# Patient Record
Sex: MALE | Race: WHITE | Employment: OTHER | URBAN - METROPOLITAN AREA
[De-identification: names, ages, dates, MRNs, and addresses within clinical notes are randomized per-mention and may not be internally consistent; named-entity substitution may affect disease eponyms.]

---

## 2017-02-07 ENCOUNTER — HOSPITAL ENCOUNTER (OUTPATIENT)
Dept: MRI IMAGING | Age: 82
Discharge: HOME | End: 2017-02-07
Attending: PHYSICAL MEDICINE & REHABILITATION
Payer: MEDICARE

## 2017-02-07 DIAGNOSIS — M54.2 CERVICALGIA: ICD-10-CM

## 2017-02-07 DIAGNOSIS — M50.90 CERVICAL DISC DISEASE: ICD-10-CM

## 2017-02-07 PROCEDURE — 72141 MRI NECK SPINE W/O DYE: CPT

## 2017-06-01 ENCOUNTER — OFFICE VISIT (OUTPATIENT)
Dept: NEUROLOGY | Age: 82
End: 2017-06-01

## 2017-06-01 VITALS
SYSTOLIC BLOOD PRESSURE: 132 MMHG | OXYGEN SATURATION: 99 % | BODY MASS INDEX: 24.17 KG/M2 | WEIGHT: 154 LBS | HEART RATE: 74 BPM | HEIGHT: 67 IN | DIASTOLIC BLOOD PRESSURE: 72 MMHG

## 2017-06-01 DIAGNOSIS — F51.04 PSYCHOPHYSIOLOGICAL INSOMNIA: ICD-10-CM

## 2017-06-01 DIAGNOSIS — E11.42 DIABETIC PERIPHERAL NEUROPATHY ASSOCIATED WITH TYPE 2 DIABETES MELLITUS (HCC): Primary | ICD-10-CM

## 2017-06-01 DIAGNOSIS — F32.A DEPRESSION, UNSPECIFIED DEPRESSION TYPE: ICD-10-CM

## 2017-06-01 RX ORDER — DULOXETIN HYDROCHLORIDE 30 MG/1
30 CAPSULE, DELAYED RELEASE ORAL DAILY
Qty: 30 CAP | Refills: 5 | Status: SHIPPED | OUTPATIENT
Start: 2017-06-01 | End: 2017-12-29 | Stop reason: SDUPTHER

## 2017-06-01 NOTE — MR AVS SNAPSHOT
Visit Information Date & Time Provider Department Dept. Phone Encounter #  
 6/1/2017  2:30 PM Lisa Bonilla NP [facility] [phone] [id number] Follow-up Instructions Return in about 6 months (around 12/1/2017). Upcoming Health Maintenance Date Due  
 LIPID PANEL Q1 1935 FOOT EXAM Q1 9/6/1945 MICROALBUMIN Q1 9/6/1945 EYE EXAM RETINAL OR DILATED Q1 9/6/1945 ZOSTER VACCINE AGE 60> 9/6/1995 GLAUCOMA SCREENING Q2Y 9/6/2000 Pneumococcal 65+ Low/Medium Risk (1 of 2 - PCV13) 9/6/2000 MEDICARE YEARLY EXAM 9/6/2000 HEMOGLOBIN A1C Q6M 7/5/2016 INFLUENZA AGE 9 TO ADULT 8/1/2017 DTaP/Tdap/Td series (2 - Td) 10/23/2025 Allergies as of 6/1/2017  Review Complete On: 6/1/2017 By: Davian Trevizo LPN Severity Noted Reaction Type Reactions Sulfa (Sulfonamide Antibiotics) Low 02/28/2012   Topical Hives Current Immunizations  Reviewed on 11/12/2016 Name Date Tdap 10/23/2015 12:12 PM  
  
 Not reviewed this visit You Were Diagnosed With   
  
 Codes Comments Diabetic peripheral neuropathy associated with type 2 diabetes mellitus ([facility])    -  Primary ICD-10-CM: E11.42 
ICD-9-CM: 250.60, 357.2 Depression, unspecified depression type     ICD-10-CM: F32.9 ICD-9-CM: 740 Psychophysiological insomnia     ICD-10-CM: F51.04 
ICD-9-CM: 307.42 Vitals BP Pulse Height(growth percentile) Weight(growth percentile) SpO2 BMI  
 132/72 74 5' 7\" (1.702 m) 154 lb (69.9 kg) 99% 24.12 kg/m2 Smoking Status Never Smoker Vitals History BMI and BSA Data Body Mass Index Body Surface Area  
 24.12 kg/m 2 1.82 m 2 Preferred Pharmacy Pharmacy Name Phone Jordyn Guadarrama [address] [facility], [address] [place] [address] [phone] Your Updated Medication List  
  
   
This list is accurate as of: 6/1/17  3:04 PM.  Always use your most recent med list.  
 acyclovir 400 mg tablet Commonly known as:  ZOVIRAX  
  
 aspirin delayed-release 81 mg tablet Take  by mouth daily. celecoxib 200 mg capsule Commonly known as:  CELEBREX  
  
 copper gluconate 2 mg Tab tablet Take  by mouth. DULoxetine 30 mg capsule Commonly known as:  CYMBALTA Take 1 Cap by mouth daily. FOLIC ACID PO Take  by mouth.  
  
 gabapentin 600 mg tablet Commonly known as:  NEURONTIN Take 1 Tab by mouth three (3) times daily. glucosamine-chondroitin 750-600 mg Tab Take 1,500 mg by mouth two (2) times a day. lisinopril 10 mg tablet Commonly known as:  PRINIVIL, ZESTRIL  
  
 multivitamin tablet Commonly known as:  ONE A DAY Take 1 Tab by mouth daily. nicotinic acid 500 mg tablet Commonly known as:  NIACIN Take 500 mg by mouth Daily (before breakfast). omeprazole 40 mg capsule Commonly known as:  PRILOSEC Take 40 mg by mouth daily. Potassium Gluconate 595 mg (99 mg) tablet Take  by mouth three (3) times daily (with meals). PROBIOTIC (S.BOULARDII) PO Take  by mouth. SALMON OIL-1000 PO Take  by mouth. traZODone 50 mg tablet Commonly known as:  DESYREL  
  
 VITAMIN B-12 1,000 mcg tablet Generic drug:  cyanocobalamin Take 1,000 mcg by mouth daily. Indications: PREVENTION OF VITAMIN B12 DEFICIENCY  
  
 VITAMIN B-6 200 mg tablet Generic drug:  pyridoxine (vitamin B6) Take 400 mg by mouth daily. VITAMIN D2 400 unit Cap Generic drug:  ergocalciferol (vitamin d2) Take 200 Units by mouth daily. zolpidem 10 mg tablet Commonly known as:  AMBIEN Take 10 mg by mouth nightly. Indications: SLEEP-ONSET INSOMNIA Prescriptions Sent to Pharmacy Refills DULoxetine (CYMBALTA) 30 mg capsule 5 Sig: Take 1 Cap by mouth daily. Class: Normal  
 Pharmacy: [address], [address] [place] [address] Ph #: [phone]  Route: Oral  
 Follow-up Instructions Return in about 6 months (around 12/1/2017). Patient Instructions PRESCRIPTION REFILL POLICY [facility] Statement to Patients April 1, 2014 In an effort to ensure the large volume of patient prescription refills is processed in the most efficient and expeditious manner, we are asking our patients to assist us by calling your Pharmacy for all prescription refills, this will include also your  Mail Order Pharmacy. The pharmacy will contact our office electronically to continue the refill process. Please do not wait until the last minute to call your pharmacy. We need at least 48 hours (2days) to fill prescriptions. We also encourage you to call your pharmacy before going to  your prescription to make sure it is ready. With regard to controlled substance prescription refill requests (narcotic refills) that need to be picked up at our office, we ask your cooperation by providing us with at least 72 hours (3days) notice that you will need a refill. We will not refill narcotic prescription refill requests after 4:00pm on any weekday, Monday through Thursday, or after 2:00pm on Fridays, or on the weekends. We encourage everyone to explore another way of getting your prescription refill request processed using YouRenew, our patient web portal through our electronic medical record system. YouRenew is an efficient and effective way to communicate your medication request directly to the office and  downloadable as an ryder on your smart phone . You[employer]ew also features a review functionality that allows you to view your medication list as well as leave messages for your physician. Are you ready to get connected? If so please review the attatched instructions or speak to any of our staff to get you set up right away! Thank you so much for your cooperation. Should you have any questions please contact our Practice Administrator. The Physicians and Staff,  [facility] A Healthy Lifestyle: Care Instructions Your Care Instructions A healthy lifestyle can help you feel good, stay at a healthy weight, and have plenty of energy for both work and play. A healthy lifestyle is something you can share with your whole family. A healthy lifestyle also can lower your risk for serious health problems, such as high blood pressure, heart disease, and diabetes. You can follow a few steps listed below to improve your health and the health of your family. Follow-up care is a key part of your treatment and safety. Be sure to make and go to all appointments, and call your doctor if you are having problems. Its also a good idea to know your test results and keep a list of the medicines you take. How can you care for yourself at home? · Do not eat too much sugar, fat, or fast foods. You can still have dessert and treats now and then. The goal is moderation. · Start small to improve your eating habits. Pay attention to portion sizes, drink less juice and soda pop, and eat more fruits and vegetables. ¨ Eat a healthy amount of food. A 3-ounce serving of meat, for example, is about the size of a deck of cards. Fill the rest of your plate with vegetables and whole grains. ¨ Limit the amount of soda and sports drinks you have every day. Drink more water when you are thirsty. ¨ Eat at least 5 servings of fruits and vegetables every day. It may seem like a lot, but it is not hard to reach this goal. A serving or helping is 1 piece of fruit, 1 cup of vegetables, or 2 cups of leafy, raw vegetables. Have an apple or some carrot sticks as an afternoon snack instead of a candy bar. Try to have fruits and/or vegetables at every meal. 
· Make exercise part of your daily routine. You may want to start with simple activities, such as walking, bicycling, or slow swimming.  Try to be active 30 to 60 minutes every day. You do not need to do all 30 to 60 minutes all at once. For example, you can exercise 3 times a day for 10 or 20 minutes. Moderate exercise is safe for most people, but it is always a good idea to talk to your doctor before starting an exercise program. 
· Keep moving. Angelique Watson the lawn, work in the garden, or [employer]. Take the stairs instead of the elevator at work. · If you smoke, quit. People who smoke have an increased risk for heart attack, stroke, cancer, and other lung illnesses. Quitting is hard, but there are ways to boost your chance of quitting tobacco for good. ¨ Use nicotine gum, patches, or lozenges. ¨ Ask your doctor about stop-smoking programs and medicines. ¨ Keep trying. In addition to reducing your risk of diseases in the future, you will notice some benefits soon after you stop using tobacco. If you have shortness of breath or asthma symptoms, they will likely get better within a few weeks after you quit. · Limit how much alcohol you drink. Moderate amounts of alcohol (up to 2 drinks a day for men, 1 drink a day for women) are okay. But drinking too much can lead to liver problems, high blood pressure, and other health problems. Family health If you have a family, there are many things you can do together to improve your health. · Eat meals together as a family as often as possible. · Eat healthy foods. This includes fruits, vegetables, lean meats and dairy, and whole grains. · Include your family in your fitness plan. Most people think of activities such as jogging or tennis as the way to fitness, but there are many ways you and your family can be more active. Anything that makes you breathe hard and gets your heart pumping is exercise. Here are some tips: 
¨ Walk to do errands or to take your child to school or the bus. ¨ Go for a family bike ride after dinner instead of watching TV. Where can you learn more? Go to http://marilynn-nils.info/. Enter R441 in the search box to learn more about \"A Healthy Lifestyle: Care Instructions. \" Current as of: July 26, 2016 Content Version: 11.2 © 4162-3244 [employer]. Care instructions adapted under license by [employer] (which disclaims liability or warranty for this information). If you have questions about a medical condition or this instruction, always ask your healthcare professional. [address] any warranty or liability for your use of this information. Introducing [facility]! Dear Mana Spangler: 
Thank you for requesting a Greenlots account. Our records indicate that you already have an active Greenlots account. You can access your account anytime at [email or web address] Did you know that you can access your hospital and ER discharge instructions at any time in Greenlots? You can also review all of your test results from your hospital stay or ER visit. Additional Information If you have questions, please visit the Frequently Asked Questions section of the Greenlots website at [email or web address] Remember, Greenlots is NOT to be used for urgent needs. For medical emergencies, dial 911. Now available from your iPhone and Android! Please provide this summary of care documentation to your next provider. Your primary care clinician is listed as Daniel Smith III. If you have any questions after today's visit, please call [phone].

## 2017-06-01 NOTE — PROGRESS NOTES
Date:             2017     Name:  Janene Kay  :  1935   MRN:  [id number]     PCP:  Nahomy Venegas MD    Chief Complaint   Patient presents with    Follow-up         HISTORY OF PRESENT ILLNESS:  Lauri Vee is a 80 y.o., male who presents today for follow up for neuropathy. His feet are very painful at night, he has tried using voltaren on his feet which he is prescribed for his arthritis and does think that it helps a little bit. He takes 600 mg gabapentin bid, he thinks that increasing that would make him tired. He could not tolerate it 3 times a day. He does not sleep very well, but doesn't think it's his feet that keep him up. They bother him for the first 30 minutes or so. He just doesn't sleep well, has trouble falling and staying asleep. He does not think that he snores, does not wake up gasping for air. He takes trazodone as needed and ambien. has had trouble with sleep since his wife  45 years ago, he is not on any antidepressant other than his occasional trazodone for sleep. 2016 recap  Lauri Vee is a 80 y.o. 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 The patient has been having increasing burning pain and numbness in his feet, with abnormal feeling in his toes and feet, and some difficulty with balance and coordination seem at the request of Dr. Bessy Álvarez. Patient's EMG study was done in 2016 and also showed a chronic L5 radiculopathy on the left side and the right borderline carpal tunnel syndrome on the right side. Patient has a known history of diabetes, and probably has a diabetic neuropathy, and does better on Neurontin 600 mg twice a day.  We did a metabolic panel to rule out other causes of his neuropathy, and they were unremarkable so it looks like he most likely has a diabetic peripheral neuropathy related to his diabetes. He has a post lumbar laminectomy syndrome with chronic pain. We suggested he see Dr. Yoshi Mcneil continue his pain management, or could refer him to [facility] [place] for pain management if he is unhappy. We will also try to increase his Neurontin to 600 mg 3 times a day and see if he can tolerate the medication and drowsiness. He will call us if any problem. Patient was initially seen 12 months ago for evaluation of sudden loss of vision with a left peripheral visual field cut, that resolved spontaneously on its own and MRI scans did not show any clear stroke and he had no intracranial or extracranial significant vascular disease. He is stable on 81 mg of aspirin every day and not had a recurrent stroke like symptoms. His carotid Doppler study showed no significant stenosis last visit. No other retinal cause for his visual loss was found. He also complains of numbness in his feet, and a slight loss of balance. He is a known diabetic. He has a history of previous lumbar laminectomy, and he is in pain management for chronic low back pain. He has no major cognitive issues, but does have some mild recent memory loss at times, but does not really feel it needs an evaluation, and his wife initially seemed concerned but then says she doesn't think he needs an evaluation either. Patient has no known history of previous stroke, no significant headache except a mild chronic generalized headache ever since the fall, no prior history of visual loss, and no other new focal weakness or sensory loss except for the numbness in his feet. Patient has no jaw claudication or other PMR symptoms. Current Outpatient Prescriptions   Medication Sig    celecoxib (CELEBREX) 200 mg capsule     lisinopril (PRINIVIL, ZESTRIL) 10 mg tablet     traZODone (DESYREL) 50 mg tablet     Potassium Gluconate 595 mg (99 mg) tablet Take  by mouth three (3) times daily (with meals).  copper gluconate 2 mg tab tablet Take  by mouth.  FOLIC ACID PO Take  by mouth.  SALMON OIL/OMEGA-3 FATTY ACIDS (SALMON OIL-1000 PO) Take  by mouth.  acyclovir (ZOVIRAX) 400 mg tablet     gabapentin (NEURONTIN) 600 mg tablet Take 1 Tab by mouth three (3) times daily.  SACCHAROMYCES BOULARDII (PROBIOTIC, S.BOULARDII, PO) Take  by mouth.  aspirin delayed-release 81 mg tablet Take  by mouth daily.  omeprazole (PRILOSEC) 40 mg capsule Take 40 mg by mouth daily.  zolpidem (AMBIEN) 10 mg tablet Take 10 mg by mouth nightly. Indications: SLEEP-ONSET INSOMNIA    cyanocobalamin (VITAMIN B-12) 1,000 mcg tablet Take 1,000 mcg by mouth daily. Indications: PREVENTION OF VITAMIN B12 DEFICIENCY    pyridoxine (VITAMIN B-6) 200 mg tablet Take 400 mg by mouth daily.  ergocalciferol, vitamin d2, (VITAMIN D) 400 unit Cap Take 200 Units by mouth daily.  nicotinic acid (NIACIN) 500 mg tablet Take 500 mg by mouth Daily (before breakfast).  GLUCOSAMINE HCL/CHONDR WELDON A NA (GLUCOSAMINE-CHONDROITIN) 750-600 mg Tab Take 1,500 mg by mouth two (2) times a day.  multivitamin (ONE A DAY) tablet Take 1 Tab by mouth daily. No current facility-administered medications for this visit.       Allergies   Allergen Reactions    Sulfa (Sulfonamide Antibiotics) Hives     Past Medical History:   Diagnosis Date    Arthritis     osteoarthritis    Cancer ([address]     prostate, basal cell CA nose, bladder CA    Diabetes (HCC)     borderline    GERD (gastroesophageal reflux disease)     Insomnia     Selenium deficiency      Past Surgical History:   Procedure Laterality Date    HX HEENT  2009    Right cataract    HX ORTHOPAEDIC  2009    Rt rotator cuff    HX ORTHOPAEDIC  1/2012    Left carpal tunnel    HX ORTHOPAEDIC      cervical spine spacers placed    HX ORTHOPAEDIC  7-2012    lumbar    HX OTHER SURGICAL  10/2011    basal cell CA nose    HX PROSTATECTOMY  1998    due to CA    HX UROLOGICAL      skin ca inside of my bladder,     Social History Social History    Marital status:      Spouse name: N/A    Number of children: N/A    Years of education: N/A     Occupational History    Not on file. Social History Main Topics    Smoking status: Never Smoker    Smokeless tobacco: Never Used      Comment: chewing tobacco quit years ago    Alcohol use 1.2 oz/week     2 Glasses of wine per week      Comment: occasionally    Drug use: No    Sexual activity: Yes     Partners: Female     Other Topics Concern    Not on file     Social History Narrative     Family History   Problem Relation Age of Onset   Rylie Zhangon Other Mother      old age   Rylie Harshad Other Father      typhoid fever    Hypertension Brother     Asthma Brother     Cancer Brother      esophageal    Diabetes Brother     Arthritis-osteo Brother          PHYSICAL EXAMINATION:    Visit Vitals    /72    Pulse 74    Ht 5' 7\" (1.702 m)    Wt 154 lb (69.9 kg)    SpO2 99%    BMI 24.12 kg/m2     General:  Well defined, nourished, and groomed individual in no acute distress. Neck: Supple, nontender, no bruits, no pain with resistance to active range of motion. Heart: Regular rate and rhythm, no murmurs, rub, or gallop. Normal S1S2. Lungs:  Clear to auscultation bilaterally with equal chest expansion, no cough, no wheeze  Musculoskeletal:  Extremities revealed no edema and had full range of motion of joints. Psych:  Good mood and bright affect    NEUROLOGICAL EXAMINATION:     Mental Status:   Alert and oriented to person, place, and time with recent and remote memory intact. Attention span and concentration are normal. Speech is fluent with a full fund of knowledge. Cranial Nerves:    II, III, IV, VI:  Visual acuity grossly intact. Pupils are equal, round, and reactive to light. Extra-ocular movements are full and fluid. No ptosis or nystagmus. V-XII: Hearing is grossly intact. Facial features are symmetric, with normal sensation and strength.   The palate rises symmetrically and the tongue protrudes midline. Sternocleidomastoids 5/5. Motor Examination: Normal tone, bulk, and strength, 5/5 muscle strength throughout. Coordination:  Finger to nose testing was normal.   No resting or intention tremor  Gait and Station:  Steady while walking and with tandem walking. Normal arm swing. No pronator drift. No muscle wasting or fasciculations noted. ASSESSMENT AND PLAN    ICD-10-CM ICD-9-CM    1. Diabetic peripheral neuropathy associated with type 2 diabetes mellitus (HCC) E11.42 250.60 DULoxetine (CYMBALTA) 30 mg capsule     357.2    2. Depression, unspecified depression type F32.9 311 DULoxetine (CYMBALTA) 30 mg capsule   3. Psychophysiological insomnia F51.04 46.42      80year-old male seen in follow-up for diabetic neuropathy. This is still bothersome, he takes gabapentin 600 mg twice daily. Cannot tolerate taking any more than that. He has trouble sleeping, but does not think it is foot pain that keeps him awake. He has trouble falling asleep and staying asleep. Does not think his sleep apnea, does not wake up gasping for air although he is very tired in the day. He has had trouble sleeping since his wife committed suicide 38 years ago. 1.  Continue gabapentin 600 mg twice daily for neuropathic pain  2. Okay to use Voltaren cream as needed for neuropathy as well  3. Will try Cymbalta 30 mg daily for both neuropathic pain and to see if mood benefits help with his sleep. Follow-up in 6 months, call sooner with concerns      Gasper Goodwin NP    This note was created using voice recognition software. Despite editing, there may be syntax errors.

## 2017-06-01 NOTE — PATIENT INSTRUCTIONS
[facility]   Statement to Patients  April 1, 2014      In an effort to ensure the large volume of patient prescription refills is processed in the most efficient and expeditious manner, we are asking our patients to assist us by calling your Pharmacy for all prescription refills, this will include also your  Mail Order Pharmacy. The pharmacy will contact our office electronically to continue the refill process. Please do not wait until the last minute to call your pharmacy. We need at least 48 hours (2days) to fill prescriptions. We also encourage you to call your pharmacy before going to  your prescription to make sure it is ready. With regard to controlled substance prescription refill requests (narcotic refills) that need to be picked up at our office, we ask your cooperation by providing us with at least 72 hours (3days) notice that you will need a refill. We will not refill narcotic prescription refill requests after 4:00pm on any weekday, Monday through Thursday, or after 2:00pm on Fridays, or on the weekends. We encourage everyone to explore another way of getting your prescription refill request processed using [employer], our patient web portal through our electronic medical record system. [employer] is an efficient and effective way to communicate your medication request directly to the office and  downloadable as an ryder on your smart phone . [employer] also features a review functionality that allows you to view your medication list as well as leave messages for your physician. Are you ready to get connected? If so please review the attatched instructions or speak to any of our staff to get you set up right away! Thank you so much for your cooperation. Should you have any questions please contact our Practice Administrator.     The Physicians and Staff,  [facility]          A Healthy Lifestyle: Care Instructions  Your Care Instructions  A healthy lifestyle can help you feel good, stay at a healthy weight, and have plenty of energy for both work and play. A healthy lifestyle is something you can share with your whole family. A healthy lifestyle also can lower your risk for serious health problems, such as high blood pressure, heart disease, and diabetes. You can follow a few steps listed below to improve your health and the health of your family. Follow-up care is a key part of your treatment and safety. Be sure to make and go to all appointments, and call your doctor if you are having problems. Its also a good idea to know your test results and keep a list of the medicines you take. How can you care for yourself at home? · Do not eat too much sugar, fat, or fast foods. You can still have dessert and treats now and then. The goal is moderation. · Start small to improve your eating habits. Pay attention to portion sizes, drink less juice and soda pop, and eat more fruits and vegetables. ¨ Eat a healthy amount of food. A 3-ounce serving of meat, for example, is about the size of a deck of cards. Fill the rest of your plate with vegetables and whole grains. ¨ Limit the amount of soda and sports drinks you have every day. Drink more water when you are thirsty. ¨ Eat at least 5 servings of fruits and vegetables every day. It may seem like a lot, but it is not hard to reach this goal. A serving or helping is 1 piece of fruit, 1 cup of vegetables, or 2 cups of leafy, raw vegetables. Have an apple or some carrot sticks as an afternoon snack instead of a candy bar. Try to have fruits and/or vegetables at every meal.  · Make exercise part of your daily routine. You may want to start with simple activities, such as walking, bicycling, or slow swimming. Try to be active 30 to 60 minutes every day. You do not need to do all 30 to 60 minutes all at once. For example, you can exercise 3 times a day for 10 or 20 minutes.  Moderate exercise is safe for most people, but it is always a good idea to talk to your doctor before starting an exercise program.  · Keep moving. Sona Canes the lawn, work in the garden, or [employer]. Take the stairs instead of the elevator at work. · If you smoke, quit. People who smoke have an increased risk for heart attack, stroke, cancer, and other lung illnesses. Quitting is hard, but there are ways to boost your chance of quitting tobacco for good. ¨ Use nicotine gum, patches, or lozenges. ¨ Ask your doctor about stop-smoking programs and medicines. ¨ Keep trying. In addition to reducing your risk of diseases in the future, you will notice some benefits soon after you stop using tobacco. If you have shortness of breath or asthma symptoms, they will likely get better within a few weeks after you quit. · Limit how much alcohol you drink. Moderate amounts of alcohol (up to 2 drinks a day for men, 1 drink a day for women) are okay. But drinking too much can lead to liver problems, high blood pressure, and other health problems. Family health  If you have a family, there are many things you can do together to improve your health. · Eat meals together as a family as often as possible. · Eat healthy foods. This includes fruits, vegetables, lean meats and dairy, and whole grains. · Include your family in your fitness plan. Most people think of activities such as jogging or tennis as the way to fitness, but there are many ways you and your family can be more active. Anything that makes you breathe hard and gets your heart pumping is exercise. Here are some tips:  ¨ Walk to do errands or to take your child to school or the bus. ¨ Go for a family bike ride after dinner instead of watching TV. Where can you learn more? Go to http://marilynn-nils.info/. Enter W030 in the search box to learn more about \"A Healthy Lifestyle: Care Instructions. \"  Current as of: July 26, 2016  Content Version: 11.2  © 2864-9645 [employer]. Care instructions adapted under license by [employer] (which disclaims liability or warranty for this information). If you have questions about a medical condition or this instruction, always ask your healthcare professional. [address] any warranty or liability for your use of this information.

## 2017-11-02 ENCOUNTER — TELEPHONE (OUTPATIENT)
Dept: NEUROLOGY | Age: 82
End: 2017-11-02

## 2017-11-02 NOTE — TELEPHONE ENCOUNTER
----- Message from Stevo Lorenzo sent at 11/2/2017  9:49 AM EDT -----  Regarding: Dr. Debra Swenson  Pt stated neuropathy in his feet is stinging and burning at night and would like to know if the doctor could recommend a cream or pills he could take.   Best contact number [phone](please leave message if no answer)

## 2017-11-02 NOTE — TELEPHONE ENCOUNTER
Patient was last seen 6/1/17 and at the time used voltaren with little benefit. Gabapentin 600mg twice daily and could not tolerate increased dose  Was to try Cymbalta 30mg daily added  I spoke with the patient and states he never filled this. Advised patient to try this to see if this helps. Spoke with Don Nur and advised the patient he can take any time during the day. I asked the patient to call back if he has further problems or effects.  Otherwise, we will see him at his follow up 12/19/17

## 2017-12-19 ENCOUNTER — OFFICE VISIT (OUTPATIENT)
Dept: NEUROLOGY | Age: 82
End: 2017-12-19

## 2017-12-19 VITALS
OXYGEN SATURATION: 96 % | HEIGHT: 67 IN | DIASTOLIC BLOOD PRESSURE: 62 MMHG | SYSTOLIC BLOOD PRESSURE: 124 MMHG | BODY MASS INDEX: 24.33 KG/M2 | HEART RATE: 58 BPM | WEIGHT: 155 LBS

## 2017-12-19 DIAGNOSIS — M96.1 CERVICAL POST-LAMINECTOMY SYNDROME: ICD-10-CM

## 2017-12-19 DIAGNOSIS — M54.16 LUMBAR BACK PAIN WITH RADICULOPATHY AFFECTING LEFT LOWER EXTREMITY: ICD-10-CM

## 2017-12-19 DIAGNOSIS — G60.8 IDIOPATHIC SMALL AND LARGE FIBER SENSORY NEUROPATHY: ICD-10-CM

## 2017-12-19 DIAGNOSIS — R41.3 MEMORY CHANGE: ICD-10-CM

## 2017-12-19 DIAGNOSIS — M96.1 LUMBAR POST-LAMINECTOMY SYNDROME: ICD-10-CM

## 2017-12-19 DIAGNOSIS — I63.231 CEREBRAL INFARCTION INVOLVING RIGHT CAROTID ARTERY (HCC): ICD-10-CM

## 2017-12-19 DIAGNOSIS — E11.42 DIABETIC PERIPHERAL NEUROPATHY ASSOCIATED WITH TYPE 2 DIABETES MELLITUS (HCC): ICD-10-CM

## 2017-12-19 RX ORDER — ZOLPIDEM TARTRATE 10 MG/1
5 TABLET ORAL
Qty: 30 TAB | Refills: 11 | Status: SHIPPED | OUTPATIENT
Start: 2017-12-19 | End: 2019-03-05 | Stop reason: ALTCHOICE

## 2017-12-19 RX ORDER — CYCLOSPORINE 0.5 MG/ML
1 EMULSION OPHTHALMIC EVERY 12 HOURS
COMMUNITY
Start: 2017-11-02 | End: 2022-06-30

## 2017-12-19 RX ORDER — GABAPENTIN 600 MG/1
600 TABLET ORAL 3 TIMES DAILY
Qty: 100 TAB | Refills: 11 | Status: SHIPPED | OUTPATIENT
Start: 2017-12-19 | End: 2018-07-06 | Stop reason: SDUPTHER

## 2017-12-19 NOTE — LETTER
12/19/2017 4:27 PM 
 
Patient:  Kristin Seymour YOB: 1935 Date of Visit: 12/19/2017 Dear No Recipients: Thank you for referring Mr. Kristin Seymour to me for evaluation/treatment. Below are the relevant portions of my assessment and plan of care. Consult Subjective:  
 
Kristin Seymour is a 80 y.o. Right-handed  male seen for evaluation at the request of Dr. 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 He was given Cymbalta 30 mg to take at noon last June, by the nurse practitioner  Cal Spann, with improvement in his pain, but made him drowsy and he discontinue the medication after 2 days. He is having trouble sleeping because the pain keeps him up at nighttime he wonders what he can do. I suggested he restart the Cymbalta at suppertime, and continue taking his Neurontin 600 mg twice a day in the interim. He can increase that to 3 times a day, possibly 1 in the morning, 1 at supper and 1 at bedtime if he needs to. Refills given to the patient for that. In addition he is on Ambien at night, and I suggested that trazodone may be safer for him, he should taper off the Ambien. He can take 1/2-1 tablet of trazodone at nighttime if he still cannot sleep with the above medications. The patient has been having increasing burning pain and numbness in his feet, with abnormal feeling in his toes and feet, and some difficulty with balance and coordination seem at the request of Dr. Shobha Greco.   Patient's EMG study was done in December 2016 and also showed a chronic L5 radiculopathy on the left side and the right borderline carpal tunnel syndrome on the right side. Patient has a known history of diabetes, and probably has a diabetic neuropathy, and does better on Neurontin 600 mg twice a day. We did a metabolic panel to rule out other causes of his neuropathy, and they were unremarkable so it looks like he most likely has a diabetic peripheral neuropathy related to his diabetes. He has a post lumbar laminectomy syndrome with chronic pain. We suggested he see Dr. Srinivasan Darden continue his pain management, or could refer him to [facility] [place] for pain management if he is unhappy. Patient was initially seen 12 months ago for evaluation of sudden loss of vision with a left peripheral visual field cut, that resolved spontaneously on its own and MRI scans did not show any clear stroke and he had no intracranial or extracranial significant vascular disease. He is stable on 81 mg of aspirin every day and not had a recurrent stroke like symptoms. His carotid Doppler study showed no significant stenosis last visit. No other retinal cause for his visual loss was found. He has no major cognitive issues, but does have some mild recent memory loss at times, but does not really feel it needs an evaluation, and his wife initially seemed concerned but then says she doesn't think he needs an evaluation either. Patient has no known history of previous stroke, no significant headache except a mild chronic generalized headache ever since the fall, no prior history of visual loss, and no other new focal weakness or sensory loss except for the numbness in his feet. Patient has no jaw claudication or other PMR symptoms. Past Medical History:  
Diagnosis Date  Arthritis   
 osteoarthritis  Cancer ([address]   
 prostate, basal cell CA nose, bladder CA  Diabetes ([address] borderline  GERD (gastroesophageal reflux disease)  Insomnia  Selenium deficiency Past Surgical History:  
Procedure Laterality Date  HX HEENT  2009 Right cataract  HX ORTHOPAEDIC  2009 Rt rotator cuff  HX ORTHOPAEDIC  1/2012 Left carpal tunnel  HX ORTHOPAEDIC    
 cervical spine spacers placed  HX ORTHOPAEDIC  [id number]  
 lumbar  HX OTHER SURGICAL  10/2011  
 basal cell CA nose  HX PROSTATECTOMY  1998  
 due to CA  
 HX UROLOGICAL    
 skin ca inside of my bladder,  
 
Family History Problem Relation Age of Onset Ian Novak Other Mother   
  old age  Other Father   
  typhoid fever  Hypertension Brother  Asthma Brother  Cancer Brother   
  esophageal  
 Diabetes Brother  Arthritis-osteo Brother Social History Substance Use Topics  Smoking status: Never Smoker  Smokeless tobacco: Never Used Comment: chewing tobacco quit years ago  Alcohol use 1.2 oz/week 2 Glasses of wine per week Comment: occasionally Current Outpatient Prescriptions:  
  RESTASIS 0.05 % ophthalmic emulsion, , Disp: , Rfl:  
  SILDENAFIL CITRATE (VIAGRA PO), Take  by mouth., Disp: , Rfl:  
  gabapentin (NEURONTIN) 600 mg tablet, Take 1 Tab by mouth three (3) times daily. , Disp: 100 Tab, Rfl: 11 
  zolpidem (AMBIEN) 10 mg tablet, Take 0.5 Tabs by mouth nightly. Max Daily Amount: 5 mg. Indications: SLEEP-ONSET INSOMNIA, Disp: 30 Tab, Rfl: 11 
  celecoxib (CELEBREX) 200 mg capsule, 200 mg daily. , Disp: , Rfl:  
  lisinopril (PRINIVIL, ZESTRIL) 10 mg tablet, 10 mg daily. , Disp: , Rfl:  
  traZODone (DESYREL) 50 mg tablet, Rarely and only half tab, Disp: , Rfl:  
  Potassium Gluconate 595 mg (99 mg) tablet, Take  by mouth three (3) times daily (with meals). , Disp: , Rfl:  
  copper gluconate 2 mg tab tablet, Take  by mouth., Disp: , Rfl:  
  FOLIC ACID PO, Take  by mouth., Disp: , Rfl:  
  SALMON OIL/OMEGA-3 FATTY ACIDS (SALMON OIL-1000 PO), Take  by mouth., Disp: , Rfl:  
  acyclovir (ZOVIRAX) 400 mg tablet, , Disp: , Rfl:  
  SACCHAROMYCES BOULARDII (PROBIOTIC, S.BOULARDII, PO), Take  by mouth., Disp: , Rfl:  
  aspirin delayed-release 81 mg tablet, Take  by mouth daily. , Disp: , Rfl:  
  omeprazole (PRILOSEC) 40 mg capsule, Take 40 mg by mouth daily. , Disp: , Rfl:  
  pyridoxine (VITAMIN B-6) 200 mg tablet, Take 400 mg by mouth daily. , Disp: , Rfl:  
  ergocalciferol, vitamin d2, (VITAMIN D) 400 unit Cap, Take 200 Units by mouth daily. , Disp: , Rfl:  
  nicotinic acid (NIACIN) 500 mg tablet, Take 500 mg by mouth Daily (before breakfast). , Disp: , Rfl:  
  GLUCOSAMINE HCL/CHONDR WELDON A NA (GLUCOSAMINE-CHONDROITIN) 750-600 mg Tab, Take 1,500 mg by mouth two (2) times a day.  , Disp: , Rfl:  
  multivitamin (ONE A DAY) tablet, Take 1 Tab by mouth daily. , Disp: , Rfl:  
  DULoxetine (CYMBALTA) 30 mg capsule, Take 1 Cap by mouth daily. , Disp: 30 Cap, Rfl: 5   cyanocobalamin (VITAMIN B-12) 1,000 mcg tablet, Take 1,000 mcg by mouth daily. Indications: PREVENTION OF VITAMIN B12 DEFICIENCY, Disp: , Rfl:  
 
 
 
Allergies Allergen Reactions  Sulfa (Sulfonamide Antibiotics) Hives Review of Systems: A comprehensive review of systems was negative except for: Eyes: positive for visual disturbance Musculoskeletal: positive for myalgias, arthralgias, stiff joints and back pain Neurological: positive for headaches, memory problems, paresthesia and gait problems Vitals:  
 12/19/17 1443 BP: 124/62 Pulse: (!) 58 SpO2: 96% Weight: 155 lb (70.3 kg) Height: 5' 7\" (1.702 m) Objective: I 
 
 
NEUROLOGICAL EXAM: 
 
Appearance: The patient is well developed, well nourished, provides a coherent history and is in no acute distress. Mental Status: Oriented to time, place and person, and the president, cognitive function is a bit slow but probably normal and speech is fluent and no aphasia or dysarthria. Mood and affect appropriate. Cranial Nerves:   Intact visual fields. Fundi are benign. MOISES, EOM's full, no nystagmus, no ptosis.  Facial sensation is normal. Corneal reflexes are not tested. Facial movement is symmetric. Hearing is abnormal bilaterally. Palate is midline with normal sternocleidomastoid and trapezius muscles are normal. Tongue is midline. Neck without meningismus or bruits Patient has no temporal artery tenderness or enlargement Motor:  5/5 strength in upper and lower proximal and distal muscles except for a foot drop on the left side from his previous back problems, and trace foot drop on the right. Normal bulk and tone. No fasciculations. Patient has very stiff lower lumbar spine with muscle spasms present and decreased range of motion Reflexes:   Deep tendon reflexes 1+/4 and symmetrical, absent ankle jerks bilaterally. No babinski or clonus present Sensory:   Abnormal to touch, pinprick and vibration and temperature in both feet to midcalf level. DSS is intact Gait:  Abnormal gait because of his back pain he moves slowly. Tremor:   No tremor noted. Cerebellar:  Mildly abnormal Romberg and tandem cerebellar signs present. Neurovascular:  Normal heart sounds and regular rhythm, peripheral pulses decreased, and no carotid bruits. Assessment: ICD-10-CM ICD-9-CM 1. Cerebral infarction involving right carotid artery ([facility]) I63.231 433.11 RESTASIS 0.05 % ophthalmic emulsion SILDENAFIL CITRATE (VIAGRA PO)  
   gabapentin (NEURONTIN) 600 mg tablet  
   zolpidem (AMBIEN) 10 mg tablet 2. Lumbar back pain with radiculopathy affecting left lower extremity M54.17 724.4 RESTASIS 0.05 % ophthalmic emulsion SILDENAFIL CITRATE (VIAGRA PO)  
   gabapentin (NEURONTIN) 600 mg tablet  
   zolpidem (AMBIEN) 10 mg tablet 3. Diabetic peripheral neuropathy associated with type 2 diabetes mellitus ([facility]) E11.42 250.60 RESTASIS 0.05 % ophthalmic emulsion 357.2 SILDENAFIL CITRATE (VIAGRA PO)  
   gabapentin (NEURONTIN) 600 mg tablet  
   zolpidem (AMBIEN) 10 mg tablet 4. Idiopathic small and large fiber sensory neuropathy G60.8 356.4 RESTASIS 0.05 % ophthalmic emulsion SILDENAFIL CITRATE (VIAGRA PO)  
   gabapentin (NEURONTIN) 600 mg tablet  
   zolpidem (AMBIEN) 10 mg tablet 5. Memory change R41.3 780.93 RESTASIS 0.05 % ophthalmic emulsion SILDENAFIL CITRATE (VIAGRA PO)  
   gabapentin (NEURONTIN) 600 mg tablet  
   zolpidem (AMBIEN) 10 mg tablet 6. Lumbar post-laminectomy syndrome M96.1 722.83 RESTASIS 0.05 % ophthalmic emulsion SILDENAFIL CITRATE (VIAGRA PO)  
   gabapentin (NEURONTIN) 600 mg tablet  
   zolpidem (AMBIEN) 10 mg tablet 7. Cervical post-laminectomy syndrome M96.1 722.81 RESTASIS 0.05 % ophthalmic emulsion SILDENAFIL CITRATE (VIAGRA PO)  
   gabapentin (NEURONTIN) 600 mg tablet  
   zolpidem (AMBIEN) 10 mg tablet Plan:  
 
Patient with increasing neuropathic symptoms in his feet, most likely a diabetic peripheral neuropathy, EMG study shows he has a moderate severe distal length-dependent demyelinating and axonal polyneuropathy consistent with his known history of diabetes For this he will continue his Neurontin twice a day, and if he needs to gradually work up to 3 times a day, and restart his Cymbalta at suppertime and try to wean off his Ambien, and if he needs a sleeping pill take trazodone 1/2-1 tablet at bedtime as needed. We advised the patient that taking sleeping pills doubles his risk of death of all causes. He also has a chronic bilateral L5 radiculopathy with dorsiflexor weakness left side greater than right He is also to continue multivitamins and vitamin D on a regular basis remained between physically active Patient had a complicated history of some visual loss on the left visual field after head injury, that seems better already, an MRI scan shows no clear stroke as a cause Carotid Dopplers showed no significant stenosis Sedimentation rate to rule out temporal arteritis was normal 
 He is encouraged to take a baby aspirin every day, multivitamin every day, and vitamin D every day. He has remained mentally and physically active 35 minutes spent with the patient, reviewing his records on the computer, reviewing his labs, reviewing his EMG and labs computer system today, and deciding he most likely has a diabetic neuropathy. He may have some mild cognitive impairment but we will defer workup for that at this time as requested by the patient Followup in 6 months, earlier if needed and they will call of any problem in the interim Signed By: Rebeca Burleson MD   
 December 19, 2017 This note will not be viewable in [address] If you have questions, please do not hesitate to call me. I look forward to following Mr. Brandy Lozada along with you. Sincerely, Rebeca Burleson MD

## 2017-12-19 NOTE — MR AVS SNAPSHOT
Visit Information Date & Time Provider Department Dept. Phone Encounter #  
 12/19/2017  2:20 PM Asya Vyas MD [facility] [phone] [id number] Follow-up Instructions Return in about 6 months (around 6/19/2018). Upcoming Health Maintenance Date Due  
 LIPID PANEL Q1 1935 FOOT EXAM Q1 9/6/1945 MICROALBUMIN Q1 9/6/1945 EYE EXAM RETINAL OR DILATED Q1 9/6/1945 ZOSTER VACCINE AGE 60> 7/6/1995 GLAUCOMA SCREENING Q2Y 9/6/2000 Pneumococcal 65+ Low/Medium Risk (1 of 2 - PCV13) 9/6/2000 MEDICARE YEARLY EXAM 9/6/2000 HEMOGLOBIN A1C Q6M 7/5/2016 Influenza Age 5 to Adult 8/1/2017 DTaP/Tdap/Td series (2 - Td) 10/23/2025 Allergies as of 12/19/2017  Review Complete On: 12/19/2017 By: Mabel Doyle Severity Noted Reaction Type Reactions Sulfa (Sulfonamide Antibiotics) Low 02/28/2012   Topical Hives Current Immunizations  Reviewed on 11/12/2016 Name Date Tdap 10/23/2015 12:12 PM  
  
 Not reviewed this visit You Were Diagnosed With   
  
 Codes Comments Cerebral infarction involving right carotid artery ([facility] [address]     ICD-10-CM: H93.849 ICD-9-CM: 433.11 Lumbar back pain with radiculopathy affecting left lower extremity     ICD-10-CM: M54.17 ICD-9-CM: 724.4 Diabetic peripheral neuropathy associated with type 2 diabetes mellitus (HCC)     ICD-10-CM: E11.42 
ICD-9-CM: 250.60, 357.2 Idiopathic small and large fiber sensory neuropathy     ICD-10-CM: G60.8 ICD-9-CM: 356.4 Memory change     ICD-10-CM: R41.3 ICD-9-CM: 780.93 Lumbar post-laminectomy syndrome     ICD-10-CM: M96.1 ICD-9-CM: 722.83 Cervical post-laminectomy syndrome     ICD-10-CM: M96.1 ICD-9-CM: 722.81 Vitals BP Pulse Height(growth percentile) Weight(growth percentile) SpO2 BMI  
 124/62 (!) 58 5' 7\" (1.702 m) 155 lb (70.3 kg) 96% 24.28 kg/m2 Smoking Status Never Smoker BMI and BSA Data Body Mass Index Body Surface Area  
 24.28 kg/m 2 1.82 m 2 Preferred Pharmacy Pharmacy Name Phone [facility] [address] [facility], [address] [place] [address] [phone] Your Updated Medication List  
  
   
This list is accurate as of: 12/19/17  3:04 PM.  Always use your most recent med list.  
  
  
  
  
 acyclovir 400 mg tablet Commonly known as:  ZOVIRAX  
  
 aspirin delayed-release 81 mg tablet Take  by mouth daily. celecoxib 200 mg capsule Commonly known as:  CELEBREX  
200 mg daily. copper gluconate 2 mg Tab tablet Take  by mouth. DULoxetine 30 mg capsule Commonly known as:  CYMBALTA Take 1 Cap by mouth daily. FOLIC ACID PO Take  by mouth.  
  
 gabapentin 600 mg tablet Commonly known as:  NEURONTIN Take 1 Tab by mouth three (3) times daily. glucosamine-chondroitin 750-600 mg Tab Take 1,500 mg by mouth two (2) times a day. lisinopril 10 mg tablet Commonly known as:  PRINIVIL, ZESTRIL  
10 mg daily. multivitamin tablet Commonly known as:  ONE A DAY Take 1 Tab by mouth daily. nicotinic acid 500 mg tablet Commonly known as:  NIACIN Take 500 mg by mouth Daily (before breakfast). omeprazole 40 mg capsule Commonly known as:  PRILOSEC Take 40 mg by mouth daily. Potassium Gluconate 595 mg (99 mg) tablet Take  by mouth three (3) times daily (with meals). PROBIOTIC (S.BOULARDII) PO Take  by mouth. RESTASIS 0.05 % ophthalmic emulsion Generic drug:  cycloSPORINE  
  
 SALMON OIL-1000 PO Take  by mouth. traZODone 50 mg tablet Commonly known as:  Issac Lipps Rarely and only half tab VIAGRA PO Take  by mouth. VITAMIN B-12 1,000 mcg tablet Generic drug:  cyanocobalamin Take 1,000 mcg by mouth daily. Indications: PREVENTION OF VITAMIN B12 DEFICIENCY  
  
 VITAMIN B-6 200 mg tablet Generic drug:  pyridoxine (vitamin B6) Take 400 mg by mouth daily. VITAMIN D2 400 unit Cap Generic drug:  ergocalciferol (vitamin d2) Take 200 Units by mouth daily. zolpidem 10 mg tablet Commonly known as:  AMBIEN Take 0.5 Tabs by mouth nightly. Max Daily Amount: 5 mg. Indications: SLEEP-ONSET INSOMNIA Prescriptions Printed Refills  
 zolpidem (AMBIEN) 10 mg tablet 11 Sig: Take 0.5 Tabs by mouth nightly. Max Daily Amount: 5 mg. Indications: SLEEP-ONSET INSOMNIA Class: Print Route: Oral  
  
Prescriptions Sent to Pharmacy Refills  
 gabapentin (NEURONTIN) 600 mg tablet 11 Sig: Take 1 Tab by mouth three (3) times daily. Class: Normal  
 Pharmacy: [address], [address] [place] [address] #: [phone] Route: Oral  
  
Follow-up Instructions Return in about 6 months (around 6/19/2018). Introducing [facility]! Dear Yanique Feeling: 
Thank you for requesting a [employer] account. Our records indicate that you already have an active [employer] account. You can access your account anytime at [email or web address]. [employer] Did you know that you can access your hospital and ER discharge instructions at any time in [employer]? You can also review all of your test results from your hospital stay or ER visit. Additional Information If you have questions, please visit the Frequently Asked Questions section of the [employer] website at [email or web address]. [employer]/. Remember, [employer] is NOT to be used for urgent needs. For medical emergencies, dial 911. Now available from your iPhone and Android! Please provide this summary of care documentation to your next provider. Your primary care clinician is listed as Maciel Norton III. If you have any questions after today's visit, please call [phone].

## 2017-12-19 NOTE — PROGRESS NOTES
Consult    Subjective:     Priyank Newton is a 80 y.o. Right-handed  male seen for evaluation at the request of Dr. 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 He was given Cymbalta 30 mg to take at noon last June, by the nurse practitioner Mrs. Hackettloni Tonia, with improvement in his pain, but made him drowsy and he discontinue the medication after 2 days. He is having trouble sleeping because the pain keeps him up at nighttime he wonders what he can do. I suggested he restart the Cymbalta at suppertime, and continue taking his Neurontin 600 mg twice a day in the interim. He can increase that to 3 times a day, possibly 1 in the morning, 1 at supper and 1 at bedtime if he needs to. Refills given to the patient for that. In addition he is on Ambien at night, and I suggested that trazodone may be safer for him, he should taper off the Ambien. He can take 1/2-1 tablet of trazodone at nighttime if he still cannot sleep with the above medications. The patient has been having increasing burning pain and numbness in his feet, with abnormal feeling in his toes and feet, and some difficulty with balance and coordination seem at the request of Dr. Courtney Block. Patient's EMG study was done in December 2016 and also showed a chronic L5 radiculopathy on the left side and the right borderline carpal tunnel syndrome on the right side. Patient has a known history of diabetes, and probably has a diabetic neuropathy, and does better on Neurontin 600 mg twice a day.  We did a metabolic panel to rule out other causes of his neuropathy, and they were unremarkable so it looks like he most likely has a diabetic peripheral neuropathy related to his diabetes. He has a post lumbar laminectomy syndrome with chronic pain. We suggested he see Dr. Seamus Martinez continue his pain management, or could refer him to [facility] [place] for pain management if he is unhappy. Patient was initially seen 12 months ago for evaluation of sudden loss of vision with a left peripheral visual field cut, that resolved spontaneously on its own and MRI scans did not show any clear stroke and he had no intracranial or extracranial significant vascular disease. He is stable on 81 mg of aspirin every day and not had a recurrent stroke like symptoms. His carotid Doppler study showed no significant stenosis last visit. No other retinal cause for his visual loss was found. He has no major cognitive issues, but does have some mild recent memory loss at times, but does not really feel it needs an evaluation, and his wife initially seemed concerned but then says she doesn't think he needs an evaluation either. Patient has no known history of previous stroke, no significant headache except a mild chronic generalized headache ever since the fall, no prior history of visual loss, and no other new focal weakness or sensory loss except for the numbness in his feet. Patient has no jaw claudication or other PMR symptoms.     Past Medical History:   Diagnosis Date    Arthritis     osteoarthritis    Cancer ([address]     prostate, basal cell CA nose, bladder CA    Diabetes (HCC)     borderline    GERD (gastroesophageal reflux disease)     Insomnia     Selenium deficiency       Past Surgical History:   Procedure Laterality Date    HX HEENT  2009    Right cataract    HX ORTHOPAEDIC  2009    Rt rotator cuff    HX ORTHOPAEDIC  1/2012    Left carpal tunnel    HX ORTHOPAEDIC      cervical spine spacers placed    HX ORTHOPAEDIC  7-2012    lumbar    HX OTHER SURGICAL  10/2011    basal cell CA nose    HX PROSTATECTOMY  1998    due to CA    HX UROLOGICAL      skin ca inside of my bladder,     Family History   Problem Relation Age of Onset    Other Mother      old age   [address] [facility] [address] Other Father      typhoid fever    Hypertension Brother     Asthma Brother     Cancer Brother      esophageal    Diabetes Brother     Arthritis-osteo Brother       Social History   Substance Use Topics    Smoking status: Never Smoker    Smokeless tobacco: Never Used      Comment: chewing tobacco quit years ago    Alcohol use 1.2 oz/week     2 Glasses of wine per week      Comment: occasionally         Current Outpatient Prescriptions:     RESTASIS 0.05 % ophthalmic emulsion, , Disp: , Rfl:     SILDENAFIL CITRATE (VIAGRA PO), Take  by mouth., Disp: , Rfl:     gabapentin (NEURONTIN) 600 mg tablet, Take 1 Tab by mouth three (3) times daily. , Disp: 100 Tab, Rfl: 11    zolpidem (AMBIEN) 10 mg tablet, Take 0.5 Tabs by mouth nightly. Max Daily Amount: 5 mg. Indications: SLEEP-ONSET INSOMNIA, Disp: 30 Tab, Rfl: 11    celecoxib (CELEBREX) 200 mg capsule, 200 mg daily. , Disp: , Rfl:     lisinopril (PRINIVIL, ZESTRIL) 10 mg tablet, 10 mg daily. , Disp: , Rfl:     traZODone (DESYREL) 50 mg tablet, Rarely and only half tab, Disp: , Rfl:     Potassium Gluconate 595 mg (99 mg) tablet, Take  by mouth three (3) times daily (with meals). , Disp: , Rfl:     copper gluconate 2 mg tab tablet, Take  by mouth., Disp: , Rfl:     FOLIC ACID PO, Take  by mouth., Disp: , Rfl:     SALMON OIL/OMEGA-3 FATTY ACIDS (SALMON OIL-1000 PO), Take  by mouth., Disp: , Rfl:     acyclovir (ZOVIRAX) 400 mg tablet, , Disp: , Rfl:     SACCHAROMYCES BOULARDII (PROBIOTIC, S.BOULARDII, PO), Take  by mouth., Disp: , Rfl:     aspirin delayed-release 81 mg tablet, Take  by mouth daily. , Disp: , Rfl:     omeprazole (PRILOSEC) 40 mg capsule, Take 40 mg by mouth daily. , Disp: , Rfl:     pyridoxine (VITAMIN B-6) 200 mg tablet, Take 400 mg by mouth daily.   , Disp: , Rfl:     ergocalciferol, vitamin d2, (VITAMIN D) 400 unit Cap, Take 200 Units by mouth daily. , Disp: , Rfl:     nicotinic acid (NIACIN) 500 mg tablet, Take 500 mg by mouth Daily (before breakfast). , Disp: , Rfl:     GLUCOSAMINE HCL/CHONDR WELDON A NA (GLUCOSAMINE-CHONDROITIN) 750-600 mg Tab, Take 1,500 mg by mouth two (2) times a day.  , Disp: , Rfl:     multivitamin (ONE A DAY) tablet, Take 1 Tab by mouth daily. , Disp: , Rfl:     DULoxetine (CYMBALTA) 30 mg capsule, Take 1 Cap by mouth daily. , Disp: 30 Cap, Rfl: 5    cyanocobalamin (VITAMIN B-12) 1,000 mcg tablet, Take 1,000 mcg by mouth daily. Indications: PREVENTION OF VITAMIN B12 DEFICIENCY, Disp: , Rfl:         Allergies   Allergen Reactions    Sulfa (Sulfonamide Antibiotics) Hives        Review of Systems:  A comprehensive review of systems was negative except for: Eyes: positive for visual disturbance  Musculoskeletal: positive for myalgias, arthralgias, stiff joints and back pain  Neurological: positive for headaches, memory problems, paresthesia and gait problems   Vitals:    12/19/17 1443   BP: 124/62   Pulse: (!) 58   SpO2: 96%   Weight: 155 lb (70.3 kg)   Height: 5' 7\" (1.702 m)     Objective:     I      NEUROLOGICAL EXAM:    Appearance: The patient is well developed, well nourished, provides a coherent history and is in no acute distress. Mental Status: Oriented to time, place and person, and the president, cognitive function is a bit slow but probably normal and speech is fluent and no aphasia or dysarthria. Mood and affect appropriate. Cranial Nerves:   Intact visual fields. Fundi are benign. MOISES, EOM's full, no nystagmus, no ptosis. Facial sensation is normal. Corneal reflexes are not tested. Facial movement is symmetric. Hearing is abnormal bilaterally. Palate is midline with normal sternocleidomastoid and trapezius muscles are normal. Tongue is midline.   Neck without meningismus or bruits  Patient has no temporal artery tenderness or enlargement   Motor:  5/5 strength in upper and lower proximal and distal muscles except for a foot drop on the left side from his previous back problems, and trace foot drop on the right. Normal bulk and tone. No fasciculations. Patient has very stiff lower lumbar spine with muscle spasms present and decreased range of motion   Reflexes:   Deep tendon reflexes 1+/4 and symmetrical, absent ankle jerks bilaterally. No babinski or clonus present   Sensory:   Abnormal to touch, pinprick and vibration and temperature in both feet to midcalf level. DSS is intact   Gait:  Abnormal gait because of his back pain he moves slowly. Tremor:   No tremor noted. Cerebellar:  Mildly abnormal Romberg and tandem cerebellar signs present. Neurovascular:  Normal heart sounds and regular rhythm, peripheral pulses decreased, and no carotid bruits. Assessment:       ICD-10-CM ICD-9-CM    1. Cerebral infarction involving right carotid artery ([facility]) I63.231 433.11 RESTASIS 0.05 % ophthalmic emulsion      SILDENAFIL CITRATE (VIAGRA PO)      gabapentin (NEURONTIN) 600 mg tablet      zolpidem (AMBIEN) 10 mg tablet   2. Lumbar back pain with radiculopathy affecting left lower extremity M54.17 724.4 RESTASIS 0.05 % ophthalmic emulsion      SILDENAFIL CITRATE (VIAGRA PO)      gabapentin (NEURONTIN) 600 mg tablet      zolpidem (AMBIEN) 10 mg tablet   3. Diabetic peripheral neuropathy associated with type 2 diabetes mellitus ([facility]) E11.42 250.60 RESTASIS 0.05 % ophthalmic emulsion     357.2 SILDENAFIL CITRATE (VIAGRA PO)      gabapentin (NEURONTIN) 600 mg tablet      zolpidem (AMBIEN) 10 mg tablet   4. Idiopathic small and large fiber sensory neuropathy G60.8 356.4 RESTASIS 0.05 % ophthalmic emulsion      SILDENAFIL CITRATE (VIAGRA PO)      gabapentin (NEURONTIN) 600 mg tablet      zolpidem (AMBIEN) 10 mg tablet   5.  Memory change R41.3 780.93 RESTASIS 0.05 % ophthalmic emulsion      SILDENAFIL CITRATE (VIAGRA PO)      gabapentin (NEURONTIN) 600 mg tablet zolpidem (AMBIEN) 10 mg tablet   6. Lumbar post-laminectomy syndrome M96.1 722.83 RESTASIS 0.05 % ophthalmic emulsion      SILDENAFIL CITRATE (VIAGRA PO)      gabapentin (NEURONTIN) 600 mg tablet      zolpidem (AMBIEN) 10 mg tablet   7. Cervical post-laminectomy syndrome M96.1 722.81 RESTASIS 0.05 % ophthalmic emulsion      SILDENAFIL CITRATE (VIAGRA PO)      gabapentin (NEURONTIN) 600 mg tablet      zolpidem (AMBIEN) 10 mg tablet         Plan:     Patient with increasing neuropathic symptoms in his feet, most likely a diabetic peripheral neuropathy, EMG study shows he has a moderate severe distal length-dependent demyelinating and axonal polyneuropathy consistent with his known history of diabetes   For this he will continue his Neurontin twice a day, and if he needs to gradually work up to 3 times a day, and restart his Cymbalta at suppertime and try to wean off his Ambien, and if he needs a sleeping pill take trazodone 1/2-1 tablet at bedtime as needed. We advised the patient that taking sleeping pills doubles his risk of death of all causes. He also has a chronic bilateral L5 radiculopathy with dorsiflexor weakness left side greater than right  He is also to continue multivitamins and vitamin D on a regular basis remained between physically active  Patient had a complicated history of some visual loss on the left visual field after head injury, that seems better already, an MRI scan shows no clear stroke as a cause  Carotid Dopplers showed no significant stenosis  Sedimentation rate to rule out temporal arteritis was normal  He is encouraged to take a baby aspirin every day, multivitamin every day, and vitamin D every day. He has remained mentally and physically active  35 minutes spent with the patient, reviewing his records on the computer, reviewing his labs, reviewing his EMG and labs computer system today, and deciding he most likely has a diabetic neuropathy.     He may have some mild cognitive impairment but we will defer workup for that at this time as requested by the patient   Followup in 6 months, earlier if needed and they will call of any problem in the interim    Signed By: Radha Barba MD     December 19, 2017       This note will not be viewable in [address]

## 2017-12-29 ENCOUNTER — TELEPHONE (OUTPATIENT)
Dept: NEUROLOGY | Age: 82
End: 2017-12-29

## 2017-12-29 DIAGNOSIS — E11.42 DIABETIC PERIPHERAL NEUROPATHY ASSOCIATED WITH TYPE 2 DIABETES MELLITUS (HCC): ICD-10-CM

## 2017-12-29 DIAGNOSIS — F32.A DEPRESSION, UNSPECIFIED DEPRESSION TYPE: ICD-10-CM

## 2017-12-29 RX ORDER — DULOXETIN HYDROCHLORIDE 30 MG/1
30 CAPSULE, DELAYED RELEASE ORAL DAILY
Qty: 30 CAP | Refills: 5 | Status: SHIPPED | OUTPATIENT
Start: 2017-12-29 | End: 2018-01-02 | Stop reason: SDUPTHER

## 2017-12-29 NOTE — TELEPHONE ENCOUNTER
Requested Prescriptions     Pending Prescriptions Disp Refills    DULoxetine (CYMBALTA) 30 mg capsule 30 Cap 5     Sig: Take 1 Cap by mouth daily.

## 2017-12-29 NOTE — TELEPHONE ENCOUNTER
Received call from patient, he stated that he had an appt with Dr. Yi Ge on Dec 19th, and he was looking over his AVS, and had a few questions he would like for the nurse to call back and answer for him.      [phone]

## 2018-01-02 DIAGNOSIS — E11.42 DIABETIC PERIPHERAL NEUROPATHY ASSOCIATED WITH TYPE 2 DIABETES MELLITUS (HCC): ICD-10-CM

## 2018-01-02 DIAGNOSIS — F32.A DEPRESSION, UNSPECIFIED DEPRESSION TYPE: ICD-10-CM

## 2018-01-02 RX ORDER — DULOXETIN HYDROCHLORIDE 30 MG/1
30 CAPSULE, DELAYED RELEASE ORAL DAILY
Qty: 90 CAP | Refills: 1 | Status: SHIPPED | OUTPATIENT
Start: 2018-01-02 | End: 2018-07-09 | Stop reason: ALTCHOICE

## 2018-04-03 ENCOUNTER — TELEPHONE (OUTPATIENT)
Dept: NEUROLOGY | Age: 83
End: 2018-04-03

## 2018-04-03 NOTE — TELEPHONE ENCOUNTER
Re:  Gabapentin- approved at a \"Lower copayment\" from [address] [place] [address]  to 12/31/18. Re: [id number] Faxed to local [facility] to [place].

## 2018-07-06 DIAGNOSIS — E11.42 DIABETIC PERIPHERAL NEUROPATHY ASSOCIATED WITH TYPE 2 DIABETES MELLITUS (HCC): ICD-10-CM

## 2018-07-06 DIAGNOSIS — I63.231 CEREBRAL INFARCTION INVOLVING RIGHT CAROTID ARTERY (HCC): ICD-10-CM

## 2018-07-06 DIAGNOSIS — M96.1 CERVICAL POST-LAMINECTOMY SYNDROME: ICD-10-CM

## 2018-07-06 DIAGNOSIS — M54.16 LUMBAR BACK PAIN WITH RADICULOPATHY AFFECTING LEFT LOWER EXTREMITY: ICD-10-CM

## 2018-07-06 DIAGNOSIS — M96.1 LUMBAR POST-LAMINECTOMY SYNDROME: ICD-10-CM

## 2018-07-06 DIAGNOSIS — R41.3 MEMORY CHANGE: ICD-10-CM

## 2018-07-06 DIAGNOSIS — G60.8 IDIOPATHIC SMALL AND LARGE FIBER SENSORY NEUROPATHY: ICD-10-CM

## 2018-07-06 RX ORDER — GABAPENTIN 600 MG/1
600 TABLET ORAL 3 TIMES DAILY
Qty: 270 TAB | Refills: 1 | Status: SHIPPED | OUTPATIENT
Start: 2018-07-06 | End: 2019-03-29 | Stop reason: SDUPTHER

## 2018-07-06 NOTE — TELEPHONE ENCOUNTER
Future Appointments  Date Time Provider [place]   7/9/2018 9:40 AM Rebeca Burleson MD [address]                         Last Appointment My Department:  12/19/2017    Please advise of refill below. Requesting 90 day fill  Requested Prescriptions     Pending Prescriptions Disp Refills    gabapentin (NEURONTIN) 600 mg tablet 270 Tab 1     Sig: Take 1 Tab by mouth three (3) times daily.

## 2018-07-09 ENCOUNTER — OFFICE VISIT (OUTPATIENT)
Dept: NEUROLOGY | Age: 83
End: 2018-07-09

## 2018-07-09 VITALS
OXYGEN SATURATION: 96 % | HEIGHT: 67 IN | DIASTOLIC BLOOD PRESSURE: 56 MMHG | WEIGHT: 152 LBS | HEART RATE: 60 BPM | BODY MASS INDEX: 23.86 KG/M2 | SYSTOLIC BLOOD PRESSURE: 124 MMHG

## 2018-07-09 DIAGNOSIS — I65.23 BILATERAL CAROTID ARTERY STENOSIS: ICD-10-CM

## 2018-07-09 DIAGNOSIS — E11.42 DIABETIC PERIPHERAL NEUROPATHY ASSOCIATED WITH TYPE 2 DIABETES MELLITUS (HCC): ICD-10-CM

## 2018-07-09 DIAGNOSIS — R41.3 MEMORY CHANGE: ICD-10-CM

## 2018-07-09 DIAGNOSIS — M96.1 CERVICAL POST-LAMINECTOMY SYNDROME: ICD-10-CM

## 2018-07-09 DIAGNOSIS — G60.8 IDIOPATHIC SMALL AND LARGE FIBER SENSORY NEUROPATHY: ICD-10-CM

## 2018-07-09 DIAGNOSIS — M96.1 LUMBAR POST-LAMINECTOMY SYNDROME: ICD-10-CM

## 2018-07-09 DIAGNOSIS — H53.462 LEFT HOMONYMOUS HEMIANOPSIA: ICD-10-CM

## 2018-07-09 DIAGNOSIS — M54.16 LUMBAR BACK PAIN WITH RADICULOPATHY AFFECTING LEFT LOWER EXTREMITY: Primary | ICD-10-CM

## 2018-07-09 DIAGNOSIS — H91.13 PRESBYCUSIS OF BOTH EARS: ICD-10-CM

## 2018-07-09 RX ORDER — DULOXETIN HYDROCHLORIDE 20 MG/1
20 CAPSULE, DELAYED RELEASE ORAL DAILY
Qty: 30 CAP | Refills: 11 | Status: SHIPPED | OUTPATIENT
Start: 2018-07-09 | End: 2019-03-05 | Stop reason: SDUPTHER

## 2018-07-09 NOTE — LETTER
7/9/2018 8:30 PM 
 
Patient:  Margarita Oro YOB: 1935 Date of Visit: 7/9/2018 Dear No Recipients: Thank you for referring Mr. Margarita Oro to me for evaluation/treatment. Below are the relevant portions of my assessment and plan of care. Consult Subjective:  
 
Margarita Oro is a 80 y.o. Right-handed  male seen for evaluation at the request of Dr. Jeremy Pa, of a new problem of being intolerant to his medication of Cymbalta which seemed to cause some GI side effects, and the patient stopped the medication because of his side effects, but then found that he took the medicine the last 2 nights seem to provide better relief for his pain, he wants to know whether or not he can go back on the medication or not for his neuropathy, secondary to latent diabetes. The last hemoglobin A1c I see was 6.3 done 2 years ago. 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 He is having trouble sleeping because the pain keeps him up at nighttime he wonders what he can do. I suggested he restart the Cymbalta but at a reduced dose of 20 mg at suppertime, and continue taking his Neurontin 600 mg twice a day in the interim. He has tried to increase the medication but it causes drowsiness and sedation and he cannot tolerate increase Neurontin to 3 a day. He can take 1 trazodone at nighttime if he still cannot sleep with the above medications.   The patient has been having increasing burning pain and numbness in his feet, with abnormal feeling in his toes and feet, and some difficulty with balance and coordination seem at the request of Dr. Anish Diallo. Patient's EMG study was done in December 2016 and also showed a chronic L5 radiculopathy on the left side and the right borderline carpal tunnel syndrome on the right side. Patient has a known history of diabetes, and probably has a diabetic neuropathy, and does better on Neurontin 600 mg twice a day. We did a metabolic panel to rule out other causes of his neuropathy, and they were unremarkable so it looks like he most likely has a diabetic peripheral neuropathy related to his diabetes. He has a post lumbar laminectomy syndrome with chronic pain. We suggested he see Dr. Ramey Sons continue his pain management, or could refer him to [facility] [place] for pain management if he is unhappy. Patient was initially seen 12 months ago for evaluation of sudden loss of vision with a left peripheral visual field cut, that resolved spontaneously on its own and MRI scans did not show any clear stroke and he had no intracranial or extracranial significant vascular disease. He is stable on 81 mg of aspirin every day and not had a recurrent stroke like symptoms. His carotid Doppler study showed no significant stenosis last visit, but he has had no recurrent Dopplers in 2 years to we will repeat that next week. . No other retinal cause for his visual loss was found. He has no major cognitive issues, but does have some mild recent memory loss at times, but does not really feel it needs an evaluation, and his wife initially seemed concerned but then says she doesn't think he needs an evaluation either. Patient has no known history of previous stroke, no significant headache except a mild chronic generalized headache ever since the fall, no prior history of visual loss, and no other new focal weakness or sensory loss except for the numbness in his feet. Patient has no jaw claudication or other PMR symptoms. Past Medical History:  
Diagnosis Date  Arthritis osteoarthritis  Cancer ([facility] [address]   
 prostate, basal cell CA nose, bladder CA  Diabetes ([facility] [address] borderline  GERD (gastroesophageal reflux disease)  Insomnia  Selenium deficiency Past Surgical History:  
Procedure Laterality Date  HX HEENT  2009 Right cataract  HX ORTHOPAEDIC  2009 Rt rotator cuff  HX ORTHOPAEDIC  1/2012 Left carpal tunnel  HX ORTHOPAEDIC    
 cervical spine spacers placed  HX ORTHOPAEDIC  [id number]  
 lumbar  HX OTHER SURGICAL  10/2011  
 basal cell CA nose  HX PROSTATECTOMY  1998  
 due to CA  
 HX UROLOGICAL    
 skin ca inside of my bladder,  
 
Family History Problem Relation Age of Onset [facility] Other Mother   
  old age  Other Father   
  typhoid fever  Hypertension Brother  Asthma Brother  Cancer Brother   
  esophageal  
 Diabetes Brother  Arthritis-osteo Brother Social History Substance Use Topics  Smoking status: Never Smoker  Smokeless tobacco: Never Used Comment: chewing tobacco quit years ago  Alcohol use 1.2 oz/week 2 Glasses of wine per week Comment: occasionally Current Outpatient Prescriptions:  
  eluxadoline (VIBERZI PO), Take  by mouth. Taking once daily as a sample for IBS, Disp: , Rfl:  
  DULoxetine (CYMBALTA) 20 mg capsule, Take 1 Cap by mouth daily. , Disp: 30 Cap, Rfl: 11 
  gabapentin (NEURONTIN) 600 mg tablet, Take 1 Tab by mouth three (3) times daily. (Patient taking differently: Take 600 mg by mouth two (2) times a day.), Disp: 270 Tab, Rfl: 1   RESTASIS 0.05 % ophthalmic emulsion, , Disp: , Rfl:  
  SILDENAFIL CITRATE (VIAGRA PO), Take  by mouth., Disp: , Rfl:  
  zolpidem (AMBIEN) 10 mg tablet, Take 0.5 Tabs by mouth nightly. Max Daily Amount: 5 mg. Indications: SLEEP-ONSET INSOMNIA, Disp: 30 Tab, Rfl: 11 
  celecoxib (CELEBREX) 200 mg capsule, 200 mg daily. , Disp: , Rfl:  
  lisinopril (PRINIVIL, ZESTRIL) 10 mg tablet, 10 mg daily. , Disp: , Rfl:  
   traZODone (DESYREL) 50 mg tablet, Rarely and only half tab, Disp: , Rfl:  
  Potassium Gluconate 595 mg (99 mg) tablet, Take  by mouth three (3) times daily (with meals). , Disp: , Rfl:  
  copper gluconate 2 mg tab tablet, Take  by mouth., Disp: , Rfl:  
  FOLIC ACID PO, Take  by mouth., Disp: , Rfl:  
  SALMON OIL/OMEGA-3 FATTY ACIDS (SALMON OIL-1000 PO), Take  by mouth., Disp: , Rfl:  
  acyclovir (ZOVIRAX) 400 mg tablet, , Disp: , Rfl:  
  SACCHAROMYCES BOULARDII (PROBIOTIC, S.BOULARDII, PO), Take  by mouth., Disp: , Rfl:  
  aspirin delayed-release 81 mg tablet, Take  by mouth daily. , Disp: , Rfl:  
  omeprazole (PRILOSEC) 40 mg capsule, Take 40 mg by mouth daily. , Disp: , Rfl:  
  cyanocobalamin (VITAMIN B-12) 1,000 mcg tablet, Take 1,000 mcg by mouth daily. Indications: PREVENTION OF VITAMIN B12 DEFICIENCY, Disp: , Rfl:  
  pyridoxine (VITAMIN B-6) 200 mg tablet, Take 400 mg by mouth daily. , Disp: , Rfl:  
  ergocalciferol, vitamin d2, (VITAMIN D) 400 unit Cap, Take 200 Units by mouth daily. , Disp: , Rfl:  
  nicotinic acid (NIACIN) 500 mg tablet, Take 500 mg by mouth Daily (before breakfast). , Disp: , Rfl:  
  GLUCOSAMINE HCL/CHONDR WELDON A NA (GLUCOSAMINE-CHONDROITIN) 750-600 mg Tab, Take 1,500 mg by mouth two (2) times a day.  , Disp: , Rfl:  
  multivitamin (ONE A DAY) tablet, Take 1 Tab by mouth daily. , Disp: , Rfl:  
 
 
 
Allergies Allergen Reactions  Sulfa (Sulfonamide Antibiotics) Hives Review of Systems: A comprehensive review of systems was negative except for: Eyes: positive for visual disturbance Musculoskeletal: positive for myalgias, arthralgias, stiff joints and back pain Neurological: positive for headaches, memory problems, paresthesia and gait problems Vitals:  
 07/09/18 1030 BP: 124/56 Pulse: 60 SpO2: 96% Weight: 152 lb (68.9 kg) Height: 5' 7\" (1.702 m) Objective: I 
 
 
NEUROLOGICAL EXAM: 
 
 Appearance: The patient is well developed, well nourished, provides a coherent history and is in no acute distress. Mental Status: Oriented to time, place and person, and the president, cognitive function is a bit slow but probably normal and speech is fluent and no aphasia or dysarthria. Mood and affect appropriate. Cranial Nerves:   Intact visual fields. Fundi are benign. MOISES, EOM's full, no nystagmus, no ptosis. Facial sensation is normal. Corneal reflexes are not tested. Facial movement is symmetric. Hearing is abnormal bilaterally. Palate is midline with normal sternocleidomastoid and trapezius muscles are normal. Tongue is midline. Neck without meningismus or bruits Patient has no temporal artery tenderness or enlargement Motor:  5/5 strength in upper and lower proximal and distal muscles except for a foot drop on the left side from his previous back problems, and trace foot drop on the right. Normal bulk and tone. No fasciculations. Patient has very stiff lower lumbar spine with muscle spasms present and decreased range of motion Reflexes:   Deep tendon reflexes 1+/4 and symmetrical, absent ankle jerks bilaterally. No babinski or clonus present Sensory:   Abnormal to touch, pinprick and vibration and temperature in both feet to midcalf level. DSS is intact Gait:  Abnormal gait because of his back pain he moves slowly. Tremor:   No tremor noted. Cerebellar:  Mildly abnormal Romberg and tandem cerebellar signs present. Neurovascular:  Normal heart sounds and regular rhythm, peripheral pulses decreased, and no carotid bruits. Assessment: ICD-10-CM ICD-9-CM 1. Lumbar back pain with radiculopathy affecting left lower extremity M54.17 724.4 eluxadoline (VIBERZI PO) DULoxetine (CYMBALTA) 20 mg capsule DUPLEX CAROTID BILATERAL AMB NEURO 2. Diabetic peripheral neuropathy associated with type 2 diabetes mellitus (HCC) E11.42 250.60 eluxadoline (VIBERZI PO) 357.2 DULoxetine (CYMBALTA) 20 mg capsule DUPLEX CAROTID BILATERAL AMB NEURO 3. Idiopathic small and large fiber sensory neuropathy G60.8 356.4 eluxadoline (VIBERZI PO) DULoxetine (CYMBALTA) 20 mg capsule DUPLEX CAROTID BILATERAL AMB NEURO 4. Memory change R41.3 780.93 eluxadoline (VIBERZI PO) DULoxetine (CYMBALTA) 20 mg capsule DUPLEX CAROTID BILATERAL AMB NEURO 5. Lumbar post-laminectomy syndrome M96.1 722.83 eluxadoline (VIBERZI PO) DULoxetine (CYMBALTA) 20 mg capsule DUPLEX CAROTID BILATERAL AMB NEURO 6. Cervical post-laminectomy syndrome M96.1 722.81 eluxadoline (VIBERZI PO) DULoxetine (CYMBALTA) 20 mg capsule DUPLEX CAROTID BILATERAL AMB NEURO 7. Left homonymous hemianopsia H53.462 368.46 eluxadoline (VIBERZI PO) DULoxetine (CYMBALTA) 20 mg capsule DUPLEX CAROTID BILATERAL AMB NEURO 8. Presbycusis of both ears H91.13 388.01 eluxadoline (VIBERZI PO) DULoxetine (CYMBALTA) 20 mg capsule DUPLEX CAROTID BILATERAL AMB NEURO 9. Bilateral carotid artery stenosis I65.23 433.10 eluxadoline (VIBERZI PO) 433.30 DULoxetine (CYMBALTA) 20 mg capsule DUPLEX CAROTID BILATERAL AMB NEURO Plan:  
 
Patient with increasing pain in his legs at night from his neuropathy, we will try him back on Cymbalta at 20 mg at night to see if he tolerates that better and take at suppertime, and that may well help his muscular skeletal back pain and radiculopathy. He will continue his Neurontin 600 mg twice a day, he has tried to increase it to 3 times a day but cannot tolerate his sedation. Patient with increasing neuropathic symptoms in his feet, most likely a diabetic peripheral neuropathy, EMG study shows he has a moderate severe distal length-dependent demyelinating and axonal polyneuropathy consistent with his known history of diabetes We advised the patient that taking sleeping pills doubles his risk of death of all causes, and he will use trazodone instead of Ambien. He also has a chronic bilateral L5 radiculopathy with dorsiflexor weakness left side greater than right He is also to continue multivitamins and vitamin D on a regular basis remained between physically active Patient had a complicated history of some visual loss on the left visual field after head injury, that seems better already, an MRI scan shows no clear stroke as a cause Carotid Dopplers showed no significant stenosis Sedimentation rate to rule out temporal arteritis was normal 
Is been 2 years since his last Dopplers we will repeat that again next week. He is encouraged to take a baby aspirin every day, multivitamin every day, and vitamin D every day. He has remained mentally and physically active 35 minutes spent with the patient, reviewing his records on the computer, reviewing his labs, reviewing his EMG and labs computer system today, and deciding he most likely has a diabetic neuropathy. He may have some mild cognitive impairment but we will defer workup for that at this time as requested by the patient Followup in 6 months, earlier if needed and they will call of any problem in the interim Signed By: Elham Patel MD   
 July 9, 2018 This note will not be viewable in [address] If you have questions, please do not hesitate to call me. I look forward to following Mr. Imelda Adams along with you. Sincerely, Elham Patel MD

## 2018-07-09 NOTE — MR AVS SNAPSHOT
[address], 
[address], [address] [facility]. 
[phone] Patient: Gricelda Eddy MRN: [id number] VGC:[birth year] Visit Information Date & Time Provider Department Dept. Phone Encounter #  
 7/9/2018  9:40 AM Reilly Gasca MD [facility] [phone] [id number] Follow-up Instructions Return in about 6 months (around 1/9/2019). Upcoming Health Maintenance Date Due  
 LIPID PANEL Q1 1935 FOOT EXAM Q1 9/6/1945 MICROALBUMIN Q1 9/6/1945 EYE EXAM RETINAL OR DILATED Q1 9/6/1945 ZOSTER VACCINE AGE 60> 7/6/1995 GLAUCOMA SCREENING Q2Y 9/6/2000 Pneumococcal 65+ Low/Medium Risk (1 of 2 - PCV13) 9/6/2000 HEMOGLOBIN A1C Q6M 7/5/2016 MEDICARE YEARLY EXAM 3/14/2018 Influenza Age 5 to Adult 8/1/2018 DTaP/Tdap/Td series (2 - Td) 10/23/2025 Allergies as of 7/9/2018  Review Complete On: 7/9/2018 By: Reilly Gasca MD  
  
 Severity Noted Reaction Type Reactions Sulfa (Sulfonamide Antibiotics) Low 02/28/2012   Topical Hives Current Immunizations  Reviewed on 11/12/2016 Name Date Tdap 10/23/2015 12:12 PM  
  
 Not reviewed this visit You Were Diagnosed With   
  
 Codes Comments Lumbar back pain with radiculopathy affecting left lower extremity    -  Primary ICD-10-CM: M54.17 ICD-9-CM: 724.4 Diabetic peripheral neuropathy associated with type 2 diabetes mellitus (HCC)     ICD-10-CM: E11.42 
ICD-9-CM: 250.60, 357.2 Idiopathic small and large fiber sensory neuropathy     ICD-10-CM: G60.8 ICD-9-CM: 356.4 Memory change     ICD-10-CM: R41.3 ICD-9-CM: 780.93 Lumbar post-laminectomy syndrome     ICD-10-CM: M96.1 ICD-9-CM: 722.83 Cervical post-laminectomy syndrome     ICD-10-CM: M96.1 ICD-9-CM: 722.81 Left homonymous hemianopsia     ICD-10-CM: Y66.008 ICD-9-CM: 368.46  Presbycusis of both ears     ICD-10-CM: H91.13 
 ICD-9-CM: 388.01 Bilateral carotid artery stenosis     ICD-10-CM: I65.23 ICD-9-CM: 433.10, 433.30 Vitals BP Pulse Height(growth percentile) Weight(growth percentile) SpO2 BMI  
 124/56 60 5' 7\" (1.702 m) 152 lb (68.9 kg) 96% 23.81 kg/m2 Smoking Status Never Smoker BMI and BSA Data Body Mass Index Body Surface Area  
 23.81 kg/m 2 1.8 m 2 Preferred Pharmacy Pharmacy Name Phone [facility] [address] , [address] [place] [address] [phone] Your Updated Medication List  
  
   
This list is accurate as of 7/9/18 10:46 AM.  Always use your most recent med list.  
  
  
  
  
 acyclovir 400 mg tablet Commonly known as:  ZOVIRAX  
  
 aspirin delayed-release 81 mg tablet Take  by mouth daily. celecoxib 200 mg capsule Commonly known as:  CELEBREX  
200 mg daily. copper gluconate 2 mg Tab tablet Take  by mouth. DULoxetine 20 mg capsule Commonly known as:  CYMBALTA Take 1 Cap by mouth daily. FOLIC ACID PO Take  by mouth.  
  
 gabapentin 600 mg tablet Commonly known as:  NEURONTIN Take 1 Tab by mouth three (3) times daily. glucosamine-chondroitin 750-600 mg Tab Take 1,500 mg by mouth two (2) times a day. lisinopril 10 mg tablet Commonly known as:  PRINIVIL, ZESTRIL  
10 mg daily. multivitamin tablet Commonly known as:  ONE A DAY Take 1 Tab by mouth daily. nicotinic acid 500 mg tablet Commonly known as:  NIACIN Take 500 mg by mouth Daily (before breakfast). omeprazole 40 mg capsule Commonly known as:  PRILOSEC Take 40 mg by mouth daily. Potassium Gluconate 595 mg (99 mg) tablet Take  by mouth three (3) times daily (with meals). PROBIOTIC (S.BOULARDII) PO Take  by mouth. RESTASIS 0.05 % ophthalmic emulsion Generic drug:  cycloSPORINE  
  
 SALMON OIL-1000 PO Take  by mouth. traZODone 50 mg tablet Commonly known as:  Issac Lipps Rarely and only half tab VIAGRA PO Take  by mouth. VIBERZI PO Take  by mouth. Taking once daily as a sample for IBS  
  
 VITAMIN B-12 1,000 mcg tablet Generic drug:  cyanocobalamin Take 1,000 mcg by mouth daily. Indications: PREVENTION OF VITAMIN B12 DEFICIENCY  
  
 VITAMIN B-6 200 mg tablet Generic drug:  pyridoxine (vitamin B6) Take 400 mg by mouth daily. VITAMIN D2 400 unit Cap Generic drug:  ergocalciferol (vitamin d2) Take 200 Units by mouth daily. zolpidem 10 mg tablet Commonly known as:  AMBIEN Take 0.5 Tabs by mouth nightly. Max Daily Amount: 5 mg. Indications: SLEEP-ONSET INSOMNIA Prescriptions Sent to Pharmacy Refills DULoxetine (CYMBALTA) 20 mg capsule 11 Sig: Take 1 Cap by mouth daily. Class: Normal  
 Pharmacy: [facility] [address] [place] [address] #: [phone] Route: Oral  
  
Follow-up Instructions Return in about 6 months (around 1/9/2019). To-Do List   
 07/12/2018 Imaging:  DUPLEX CAROTID BILATERAL AMB NEURO Patient Instructions Office Policies 
 
o Phone calls/patient messages: Please allow up to 24 hours for someone in the office to contact you about your call or message. Be mindful your provider may be out of the office or your message may require further review. We encourage you to use [employer] for your messages as this is a faster, more efficient way to communicate with our office 
 
o Medication Refills: 
Prescription medications require up to 48 business hours to process. We encourage you to use [employer] for your refills. For controlled medications: Please allow up to 72 business hours to process. Certain medications may require you to  a written prescription at our office. NO narcotic/controlled medications will be prescribed after 4pm Monday through Friday or on weekends 
 
o Form/Paperwork Completion: Please note there is a $25 fee for all paperwork completed by our providers. We ask that you allow 7-14 business days. Pre-payment is due prior to picking up/faxing the completed form. You may also download your forms to [employer] to have your doctor print off. A Healthy Lifestyle: Care Instructions Your Care Instructions A healthy lifestyle can help you feel good, stay at a healthy weight, and have plenty of energy for both work and play. A healthy lifestyle is something you can share with your whole family. A healthy lifestyle also can lower your risk for serious health problems, such as high blood pressure, heart disease, and diabetes. You can follow a few steps listed below to improve your health and the health of your family. Follow-up care is a key part of your treatment and safety. Be sure to make and go to all appointments, and call your doctor if you are having problems. It's also a good idea to know your test results and keep a list of the medicines you take. How can you care for yourself at home? · Do not eat too much sugar, fat, or fast foods. You can still have dessert and treats now and then. The goal is moderation. · Start small to improve your eating habits. Pay attention to portion sizes, drink less juice and soda pop, and eat more fruits and vegetables. ¨ Eat a healthy amount of food. A 3-ounce serving of meat, for example, is about the size of a deck of cards. Fill the rest of your plate with vegetables and whole grains. ¨ Limit the amount of soda and sports drinks you have every day. Drink more water when you are thirsty. ¨ Eat at least 5 servings of fruits and vegetables every day. It may seem like a lot, but it is not hard to reach this goal. A serving or helping is 1 piece of fruit, 1 cup of vegetables, or 2 cups of leafy, raw vegetables. Have an apple or some carrot sticks as an afternoon snack instead of a candy bar.  Try to have fruits and/or vegetables at every meal. 
 · Make exercise part of your daily routine. You may want to start with simple activities, such as walking, bicycling, or slow swimming. Try to be active 30 to 60 minutes every day. You do not need to do all 30 to 60 minutes all at once. For example, you can exercise 3 times a day for 10 or 20 minutes. Moderate exercise is safe for most people, but it is always a good idea to talk to your doctor before starting an exercise program. 
· Keep moving. Lissette Zahira the lawn, work in the garden, or [employer]. Take the stairs instead of the elevator at work. · If you smoke, quit. People who smoke have an increased risk for heart attack, stroke, cancer, and other lung illnesses. Quitting is hard, but there are ways to boost your chance of quitting tobacco for good. ¨ Use nicotine gum, patches, or lozenges. ¨ Ask your doctor about stop-smoking programs and medicines. ¨ Keep trying. In addition to reducing your risk of diseases in the future, you will notice some benefits soon after you stop using tobacco. If you have shortness of breath or asthma symptoms, they will likely get better within a few weeks after you quit. · Limit how much alcohol you drink. Moderate amounts of alcohol (up to 2 drinks a day for men, 1 drink a day for women) are okay. But drinking too much can lead to liver problems, high blood pressure, and other health problems. Family health If you have a family, there are many things you can do together to improve your health. · Eat meals together as a family as often as possible. · Eat healthy foods. This includes fruits, vegetables, lean meats and dairy, and whole grains. · Include your family in your fitness plan. Most people think of activities such as jogging or tennis as the way to fitness, but there are many ways you and your family can be more active. Anything that makes you breathe hard and gets your heart pumping is exercise. Here are some tips: ¨ Walk to do errands or to take your child to school or the bus. ¨ Go for a family bike ride after dinner instead of watching TV. Where can you learn more? Go to http://marilynn-nils.info/. Enter E930 in the search box to learn more about \"A Healthy Lifestyle: Care Instructions. \" Current as of: May 12, 2017 Content Version: 11.4 © 8469-5034 [employer]. Care instructions adapted under license by [employer] (which disclaims liability or warranty for this information). If you have questions about a medical condition or this instruction, always ask your healthcare professional. [address] any warranty or liability for your use of this information. Introducing [facility]! Dear Josie Seek: 
Thank you for requesting a [employer] account. Our records indicate that you already have an active [employer] account. You can access your account anytime at [email or web address] Did you know that you can access your hospital and ER discharge instructions at any time in [employer]? You can also review all of your test results from your hospital stay or ER visit. Additional Information If you have questions, please visit the Frequently Asked Questions section of the [employer] website at [email or web address] Remember, [employer] is NOT to be used for urgent needs. For medical emergencies, dial 911. Now available from your iPhone and Android! Please provide this summary of care documentation to your next provider. Your primary care clinician is listed as Elizbeth Dakins III. If you have any questions after today's visit, please call [phone].

## 2018-07-09 NOTE — PATIENT INSTRUCTIONS
Office Policies 
 
o Phone calls/patient messages: Please allow up to 24 hours for someone in the office to contact you about your call or message. Be mindful your provider may be out of the office or your message may require further review. We encourage you to use basno for your messages as this is a faster, more efficient way to communicate with our office 
 
o Medication Refills: 
Prescription medications require up to 48 business hours to process. We encourage you to use basno for your refills. For controlled medications: Please allow up to 72 business hours to process. Certain medications may require you to  a written prescription at our office. NO narcotic/controlled medications will be prescribed after 4pm Monday through Friday or on weekends 
 
o Form/Paperwork Completion: 
Please note there is a $25 fee for all paperwork completed by our providers. We ask that you allow 7-14 business days. Pre-payment is due prior to picking up/faxing the completed form. You may also download your forms to basno to have your doctor print off. A Healthy Lifestyle: Care Instructions Your Care Instructions A healthy lifestyle can help you feel good, stay at a healthy weight, and have plenty of energy for both work and play. A healthy lifestyle is something you can share with your whole family. A healthy lifestyle also can lower your risk for serious health problems, such as high blood pressure, heart disease, and diabetes. You can follow a few steps listed below to improve your health and the health of your family. Follow-up care is a key part of your treatment and safety. Be sure to make and go to all appointments, and call your doctor if you are having problems. It's also a good idea to know your test results and keep a list of the medicines you take. How can you care for yourself at home? · Do not eat too much sugar, fat, or fast foods. You can still have dessert and treats now and then. The goal is moderation. · Start small to improve your eating habits. Pay attention to portion sizes, drink less juice and soda pop, and eat more fruits and vegetables. ¨ Eat a healthy amount of food. A 3-ounce serving of meat, for example, is about the size of a deck of cards. Fill the rest of your plate with vegetables and whole grains. ¨ Limit the amount of soda and sports drinks you have every day. Drink more water when you are thirsty. ¨ Eat at least 5 servings of fruits and vegetables every day. It may seem like a lot, but it is not hard to reach this goal. A serving or helping is 1 piece of fruit, 1 cup of vegetables, or 2 cups of leafy, raw vegetables. Have an apple or some carrot sticks as an afternoon snack instead of a candy bar. Try to have fruits and/or vegetables at every meal. 
· Make exercise part of your daily routine. You may want to start with simple activities, such as walking, bicycling, or slow swimming. Try to be active 30 to 60 minutes every day. You do not need to do all 30 to 60 minutes all at once. For example, you can exercise 3 times a day for 10 or 20 minutes. Moderate exercise is safe for most people, but it is always a good idea to talk to your doctor before starting an exercise program. 
· Keep moving. [employer] the lawn, work in the garden, or [employer]. Take the stairs instead of the elevator at work. · If you smoke, quit. People who smoke have an increased risk for heart attack, stroke, cancer, and other lung illnesses. Quitting is hard, but there are ways to boost your chance of quitting tobacco for good. ¨ Use nicotine gum, patches, or lozenges. ¨ Ask your doctor about stop-smoking programs and medicines. ¨ Keep trying. In addition to reducing your risk of diseases in the future, you will notice some benefits soon after you stop using tobacco. If you have shortness of breath or asthma symptoms, they will likely get better within a few weeks after you quit.  
· Limit how much alcohol you drink. Moderate amounts of alcohol (up to 2 drinks a day for men, 1 drink a day for women) are okay. But drinking too much can lead to liver problems, high blood pressure, and other health problems. Family health If you have a family, there are many things you can do together to improve your health. · Eat meals together as a family as often as possible. · Eat healthy foods. This includes fruits, vegetables, lean meats and dairy, and whole grains. · Include your family in your fitness plan. Most people think of activities such as jogging or tennis as the way to fitness, but there are many ways you and your family can be more active. Anything that makes you breathe hard and gets your heart pumping is exercise. Here are some tips: 
¨ Walk to do errands or to take your child to school or the bus. ¨ Go for a family bike ride after dinner instead of watching TV. Where can you learn more? Go to http://marilynn-nils.info/. Enter X152 in the search box to learn more about \"A Healthy Lifestyle: Care Instructions. \" Current as of: May 12, 2017 Content Version: 11.4 © 5775-0229 [employer]. Care instructions adapted under license by [employer] (which disclaims liability or warranty for this information). If you have questions about a medical condition or this instruction, always ask your healthcare professional. [address] any warranty or liability for your use of this information.

## 2018-07-10 NOTE — PROGRESS NOTES
Consult    Subjective:     Josefa Lopez is a 80 y.o. Right-handed  male seen for evaluation at the request of Dr. Misael Fox, of a new problem of being intolerant to his medication of Cymbalta which seemed to cause some GI side effects, and the patient stopped the medication because of his side effects, but then found that he took the medicine the last 2 nights seem to provide better relief for his pain, he wants to know whether or not he can go back on the medication or not for his neuropathy, secondary to latent diabetes. The last hemoglobin A1c I see was 6.3 done 2 years ago. 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 He is having trouble sleeping because the pain keeps him up at nighttime he wonders what he can do. I suggested he restart the Cymbalta but at a reduced dose of 20 mg at suppertime, and continue taking his Neurontin 600 mg twice a day in the interim. He has tried to increase the medication but it causes drowsiness and sedation and he cannot tolerate increase Neurontin to 3 a day. He can take 1 trazodone at nighttime if he still cannot sleep with the above medications. The patient has been having increasing burning pain and numbness in his feet, with abnormal feeling in his toes and feet, and some difficulty with balance and coordination seem at the request of Dr. Neymar Combs. Patient's EMG study was done in December 2016 and also showed a chronic L5 radiculopathy on the left side and the right borderline carpal tunnel syndrome on the right side.   Patient has a known history of diabetes, and probably has a diabetic neuropathy, and does better on Neurontin 600 mg twice a day. We did a metabolic panel to rule out other causes of his neuropathy, and they were unremarkable so it looks like he most likely has a diabetic peripheral neuropathy related to his diabetes. He has a post lumbar laminectomy syndrome with chronic pain. We suggested he see Dr. Tiffany Bauer continue his pain management, or could refer him to [facility] [place] for pain management if he is unhappy. Patient was initially seen 12 months ago for evaluation of sudden loss of vision with a left peripheral visual field cut, that resolved spontaneously on its own and MRI scans did not show any clear stroke and he had no intracranial or extracranial significant vascular disease. He is stable on 81 mg of aspirin every day and not had a recurrent stroke like symptoms. His carotid Doppler study showed no significant stenosis last visit, but he has had no recurrent Dopplers in 2 years to we will repeat that next week. . No other retinal cause for his visual loss was found. He has no major cognitive issues, but does have some mild recent memory loss at times, but does not really feel it needs an evaluation, and his wife initially seemed concerned but then says she doesn't think he needs an evaluation either. Patient has no known history of previous stroke, no significant headache except a mild chronic generalized headache ever since the fall, no prior history of visual loss, and no other new focal weakness or sensory loss except for the numbness in his feet. Patient has no jaw claudication or other PMR symptoms.     Past Medical History:   Diagnosis Date    Arthritis     osteoarthritis    Cancer ([address]     prostate, basal cell CA nose, bladder CA    Diabetes (HCC)     borderline    GERD (gastroesophageal reflux disease)     Insomnia     Selenium deficiency       Past Surgical History:   Procedure Laterality Date    HX HEENT  2009    Right cataract    HX ORTHOPAEDIC  2009    Rt rotator cuff    HX ORTHOPAEDIC  1/2012    Left carpal tunnel    HX ORTHOPAEDIC      cervical spine spacers placed    HX ORTHOPAEDIC  7-2012    lumbar    HX OTHER SURGICAL  10/2011    basal cell CA nose    HX PROSTATECTOMY  1998    due to CA    HX UROLOGICAL      skin ca inside of my bladder,     Family History   Problem Relation Age of Onset    Other Mother      old age   [facility] Other Father      typhoid fever    Hypertension Brother     Asthma Brother     Cancer Brother      esophageal    Diabetes Brother     Arthritis-osteo Brother       Social History   Substance Use Topics    Smoking status: Never Smoker    Smokeless tobacco: Never Used      Comment: chewing tobacco quit years ago    Alcohol use 1.2 oz/week     2 Glasses of wine per week      Comment: occasionally         Current Outpatient Prescriptions:     eluxadoline (VIBERZI PO), Take  by mouth. Taking once daily as a sample for IBS, Disp: , Rfl:     DULoxetine (CYMBALTA) 20 mg capsule, Take 1 Cap by mouth daily. , Disp: 30 Cap, Rfl: 11    gabapentin (NEURONTIN) 600 mg tablet, Take 1 Tab by mouth three (3) times daily. (Patient taking differently: Take 600 mg by mouth two (2) times a day.), Disp: 270 Tab, Rfl: 1    RESTASIS 0.05 % ophthalmic emulsion, , Disp: , Rfl:     SILDENAFIL CITRATE (VIAGRA PO), Take  by mouth., Disp: , Rfl:     zolpidem (AMBIEN) 10 mg tablet, Take 0.5 Tabs by mouth nightly. Max Daily Amount: 5 mg. Indications: SLEEP-ONSET INSOMNIA, Disp: 30 Tab, Rfl: 11    celecoxib (CELEBREX) 200 mg capsule, 200 mg daily. , Disp: , Rfl:     lisinopril (PRINIVIL, ZESTRIL) 10 mg tablet, 10 mg daily. , Disp: , Rfl:     traZODone (DESYREL) 50 mg tablet, Rarely and only half tab, Disp: , Rfl:     Potassium Gluconate 595 mg (99 mg) tablet, Take  by mouth three (3) times daily (with meals). , Disp: , Rfl:     copper gluconate 2 mg tab tablet, Take  by mouth., Disp: , Rfl:     FOLIC ACID PO, Take  by mouth., Disp: , Rfl:     SALMON OIL/OMEGA-3 FATTY ACIDS (SALMON OIL-1000 PO), Take  by mouth., Disp: , Rfl:     acyclovir (ZOVIRAX) 400 mg tablet, , Disp: , Rfl:     SACCHAROMYCES BOULARDII (PROBIOTIC, S.BOULARDII, PO), Take  by mouth., Disp: , Rfl:     aspirin delayed-release 81 mg tablet, Take  by mouth daily. , Disp: , Rfl:     omeprazole (PRILOSEC) 40 mg capsule, Take 40 mg by mouth daily. , Disp: , Rfl:     cyanocobalamin (VITAMIN B-12) 1,000 mcg tablet, Take 1,000 mcg by mouth daily. Indications: PREVENTION OF VITAMIN B12 DEFICIENCY, Disp: , Rfl:     pyridoxine (VITAMIN B-6) 200 mg tablet, Take 400 mg by mouth daily. , Disp: , Rfl:     ergocalciferol, vitamin d2, (VITAMIN D) 400 unit Cap, Take 200 Units by mouth daily. , Disp: , Rfl:     nicotinic acid (NIACIN) 500 mg tablet, Take 500 mg by mouth Daily (before breakfast). , Disp: , Rfl:     GLUCOSAMINE HCL/CHONDR WELDON A NA (GLUCOSAMINE-CHONDROITIN) 750-600 mg Tab, Take 1,500 mg by mouth two (2) times a day.  , Disp: , Rfl:     multivitamin (ONE A DAY) tablet, Take 1 Tab by mouth daily. , Disp: , Rfl:         Allergies   Allergen Reactions    Sulfa (Sulfonamide Antibiotics) Hives        Review of Systems:  A comprehensive review of systems was negative except for: Eyes: positive for visual disturbance  Musculoskeletal: positive for myalgias, arthralgias, stiff joints and back pain  Neurological: positive for headaches, memory problems, paresthesia and gait problems   Vitals:    07/09/18 1030   BP: 124/56   Pulse: 60   SpO2: 96%   Weight: 152 lb (68.9 kg)   Height: 5' 7\" (1.702 m)     Objective:     I      NEUROLOGICAL EXAM:    Appearance: The patient is well developed, well nourished, provides a coherent history and is in no acute distress. Mental Status: Oriented to time, place and person, and the president, cognitive function is a bit slow but probably normal and speech is fluent and no aphasia or dysarthria. Mood and affect appropriate. Cranial Nerves:   Intact visual fields. Fundi are benign. MOISES, EOM's full, no nystagmus, no ptosis. Facial sensation is normal. Corneal reflexes are not tested. Facial movement is symmetric. Hearing is abnormal bilaterally. Palate is midline with normal sternocleidomastoid and trapezius muscles are normal. Tongue is midline. Neck without meningismus or bruits  Patient has no temporal artery tenderness or enlargement   Motor:  5/5 strength in upper and lower proximal and distal muscles except for a foot drop on the left side from his previous back problems, and trace foot drop on the right. Normal bulk and tone. No fasciculations. Patient has very stiff lower lumbar spine with muscle spasms present and decreased range of motion   Reflexes:   Deep tendon reflexes 1+/4 and symmetrical, absent ankle jerks bilaterally. No babinski or clonus present   Sensory:   Abnormal to touch, pinprick and vibration and temperature in both feet to midcalf level. DSS is intact   Gait:  Abnormal gait because of his back pain he moves slowly. Tremor:   No tremor noted. Cerebellar:  Mildly abnormal Romberg and tandem cerebellar signs present. Neurovascular:  Normal heart sounds and regular rhythm, peripheral pulses decreased, and no carotid bruits. Assessment:       ICD-10-CM ICD-9-CM    1. Lumbar back pain with radiculopathy affecting left lower extremity M54.17 724.4 eluxadoline (VIBERZI PO)      DULoxetine (CYMBALTA) 20 mg capsule      DUPLEX CAROTID BILATERAL AMB NEURO   2. Diabetic peripheral neuropathy associated with type 2 diabetes mellitus (HCC) E11.42 250.60 eluxadoline (VIBERZI PO)     357.2 DULoxetine (CYMBALTA) 20 mg capsule      DUPLEX CAROTID BILATERAL AMB NEURO   3. Idiopathic small and large fiber sensory neuropathy G60.8 356.4 eluxadoline (VIBERZI PO)      DULoxetine (CYMBALTA) 20 mg capsule      DUPLEX CAROTID BILATERAL AMB NEURO   4.  Memory change R41.3 780.93 eluxadoline (VIBERZI PO)      DULoxetine (CYMBALTA) 20 mg capsule      DUPLEX CAROTID BILATERAL AMB NEURO   5. Lumbar post-laminectomy syndrome M96.1 722.83 eluxadoline (VIBERZI PO)      DULoxetine (CYMBALTA) 20 mg capsule      DUPLEX CAROTID BILATERAL AMB NEURO   6. Cervical post-laminectomy syndrome M96.1 722.81 eluxadoline (VIBERZI PO)      DULoxetine (CYMBALTA) 20 mg capsule      DUPLEX CAROTID BILATERAL AMB NEURO   7. Left homonymous hemianopsia H53.462 368.46 eluxadoline (VIBERZI PO)      DULoxetine (CYMBALTA) 20 mg capsule      DUPLEX CAROTID BILATERAL AMB NEURO   8. Presbycusis of both ears H91.13 388.01 eluxadoline (VIBERZI PO)      DULoxetine (CYMBALTA) 20 mg capsule      DUPLEX CAROTID BILATERAL AMB NEURO   9. Bilateral carotid artery stenosis I65.23 433.10 eluxadoline (VIBERZI PO)     433.30 DULoxetine (CYMBALTA) 20 mg capsule      DUPLEX CAROTID BILATERAL AMB NEURO         Plan:     Patient with increasing pain in his legs at night from his neuropathy, we will try him back on Cymbalta at 20 mg at night to see if he tolerates that better and take at suppertime, and that may well help his muscular skeletal back pain and radiculopathy. He will continue his Neurontin 600 mg twice a day, he has tried to increase it to 3 times a day but cannot tolerate his sedation. Patient with increasing neuropathic symptoms in his feet, most likely a diabetic peripheral neuropathy, EMG study shows he has a moderate severe distal length-dependent demyelinating and axonal polyneuropathy consistent with his known history of diabetes   We advised the patient that taking sleeping pills doubles his risk of death of all causes, and he will use trazodone instead of Ambien.   He also has a chronic bilateral L5 radiculopathy with dorsiflexor weakness left side greater than right  He is also to continue multivitamins and vitamin D on a regular basis remained between physically active  Patient had a complicated history of some visual loss on the left visual field after head injury, that seems better already, an MRI scan shows no clear stroke as a cause  Carotid Dopplers showed no significant stenosis  Sedimentation rate to rule out temporal arteritis was normal  Is been 2 years since his last Dopplers we will repeat that again next week. He is encouraged to take a baby aspirin every day, multivitamin every day, and vitamin D every day. He has remained mentally and physically active  35 minutes spent with the patient, reviewing his records on the computer, reviewing his labs, reviewing his EMG and labs computer system today, and deciding he most likely has a diabetic neuropathy. He may have some mild cognitive impairment but we will defer workup for that at this time as requested by the patient   Followup in 6 months, earlier if needed and they will call of any problem in the interim    Signed By: Alicia Colindres MD     July 9, 2018       This note will not be viewable in [address]

## 2018-07-16 ENCOUNTER — HOSPITAL ENCOUNTER (OUTPATIENT)
Dept: ULTRASOUND IMAGING | Age: 83
Discharge: HOME | End: 2018-07-16
Payer: MEDICARE

## 2018-07-16 DIAGNOSIS — K58.0 IRRITABLE BOWEL SYNDROME WITH DIARRHEA: ICD-10-CM

## 2018-07-16 DIAGNOSIS — R14.0 ABDOMINAL DISTENTION: ICD-10-CM

## 2018-07-16 PROCEDURE — 76700 US EXAM ABDOM COMPLETE: CPT

## 2018-07-26 ENCOUNTER — OFFICE VISIT (OUTPATIENT)
Dept: NEUROLOGY | Age: 83
End: 2018-07-26

## 2018-07-26 DIAGNOSIS — I65.23 BILATERAL CAROTID ARTERY STENOSIS: ICD-10-CM

## 2018-07-26 DIAGNOSIS — R41.3 MEMORY CHANGE: ICD-10-CM

## 2018-07-26 DIAGNOSIS — I67.89 CEREBRAL MICROVASCULAR DISEASE: Primary | ICD-10-CM

## 2018-07-26 DIAGNOSIS — H91.13 PRESBYCUSIS OF BOTH EARS: ICD-10-CM

## 2018-07-26 DIAGNOSIS — M96.1 CERVICAL POST-LAMINECTOMY SYNDROME: ICD-10-CM

## 2018-07-26 DIAGNOSIS — M96.1 LUMBAR POST-LAMINECTOMY SYNDROME: ICD-10-CM

## 2018-07-26 DIAGNOSIS — M54.16 LUMBAR BACK PAIN WITH RADICULOPATHY AFFECTING LEFT LOWER EXTREMITY: ICD-10-CM

## 2018-07-26 DIAGNOSIS — E11.42 DIABETIC PERIPHERAL NEUROPATHY ASSOCIATED WITH TYPE 2 DIABETES MELLITUS (HCC): ICD-10-CM

## 2018-07-26 DIAGNOSIS — H53.462 LEFT HOMONYMOUS HEMIANOPSIA: ICD-10-CM

## 2018-07-26 DIAGNOSIS — G60.8 IDIOPATHIC SMALL AND LARGE FIBER SENSORY NEUROPATHY: ICD-10-CM

## 2018-07-27 NOTE — PROCEDURES
This study consisted of pulsed wave Doppler examination, Color-flow imaging, and Duplex imaging of both the right and left carotid systems, and both vertebral arteries.        Imaging of both right and left carotid systems showed no mixed plaquing at the bifurcations and proximal and distal internal and external carotid arteries bilaterally, with stenosis in the range of 0% only and with no flow abnormalities identified.        Both vertebral arteries showed normal antegrade flow.

## 2018-07-30 ENCOUNTER — HOSPITAL ENCOUNTER (OUTPATIENT)
Dept: GENERAL RADIOLOGY | Age: 83
Discharge: HOME | End: 2018-07-30
Payer: MEDICARE

## 2018-07-30 DIAGNOSIS — R07.81 RIB PAIN ON RIGHT SIDE: ICD-10-CM

## 2018-07-30 PROCEDURE — 71101 X-RAY EXAM UNILAT RIBS/CHEST: CPT

## 2018-07-30 PROCEDURE — 72100 X-RAY EXAM L-S SPINE 2/3 VWS: CPT

## 2018-10-16 ENCOUNTER — HOSPITAL ENCOUNTER (OUTPATIENT)
Dept: MRI IMAGING | Age: 83
Discharge: HOME | End: 2018-10-16
Attending: PHYSICAL MEDICINE & REHABILITATION
Payer: MEDICARE

## 2018-10-16 DIAGNOSIS — M51.36 DDD (DEGENERATIVE DISC DISEASE), LUMBAR: ICD-10-CM

## 2018-10-16 PROCEDURE — 72148 MRI LUMBAR SPINE W/O DYE: CPT

## 2018-12-07 ENCOUNTER — TELEPHONE (OUTPATIENT)
Dept: NEUROLOGY | Age: 83
End: 2018-12-07

## 2018-12-07 NOTE — TELEPHONE ENCOUNTER
rec'd letter from [employer] approving a lower copayment for Gabapentin - date range of this Ref [id number] is 12/31/18  - 12/31/19. Faxed this letter to Anni Jaffe.

## 2019-01-18 ENCOUNTER — HOSPITAL ENCOUNTER (OUTPATIENT)
Dept: CT IMAGING | Age: 84
Discharge: HOME | End: 2019-01-18
Payer: MEDICARE

## 2019-01-18 DIAGNOSIS — G44.309 HEADACHE DUE TO TRAUMA: ICD-10-CM

## 2019-01-18 DIAGNOSIS — S09.90XA HEAD INJURY: ICD-10-CM

## 2019-01-18 PROCEDURE — 70450 CT HEAD/BRAIN W/O DYE: CPT

## 2019-03-05 ENCOUNTER — OFFICE VISIT (OUTPATIENT)
Dept: NEUROLOGY | Age: 84
End: 2019-03-05

## 2019-03-05 VITALS
OXYGEN SATURATION: 99 % | HEART RATE: 78 BPM | SYSTOLIC BLOOD PRESSURE: 128 MMHG | RESPIRATION RATE: 16 BRPM | WEIGHT: 158 LBS | HEIGHT: 67 IN | BODY MASS INDEX: 24.8 KG/M2 | DIASTOLIC BLOOD PRESSURE: 70 MMHG

## 2019-03-05 DIAGNOSIS — H53.462 LEFT HOMONYMOUS HEMIANOPSIA: ICD-10-CM

## 2019-03-05 DIAGNOSIS — M96.1 LUMBAR POST-LAMINECTOMY SYNDROME: ICD-10-CM

## 2019-03-05 DIAGNOSIS — E11.42 DIABETIC PERIPHERAL NEUROPATHY ASSOCIATED WITH TYPE 2 DIABETES MELLITUS (HCC): ICD-10-CM

## 2019-03-05 DIAGNOSIS — H91.13 PRESBYCUSIS OF BOTH EARS: ICD-10-CM

## 2019-03-05 DIAGNOSIS — I65.23 BILATERAL CAROTID ARTERY STENOSIS: ICD-10-CM

## 2019-03-05 DIAGNOSIS — E03.4 HYPOTHYROIDISM DUE TO ACQUIRED ATROPHY OF THYROID: ICD-10-CM

## 2019-03-05 DIAGNOSIS — M96.1 CERVICAL POST-LAMINECTOMY SYNDROME: ICD-10-CM

## 2019-03-05 DIAGNOSIS — R41.3 MEMORY CHANGE: ICD-10-CM

## 2019-03-05 DIAGNOSIS — E53.8 B12 DEFICIENCY: Primary | ICD-10-CM

## 2019-03-05 DIAGNOSIS — M54.16 LUMBAR BACK PAIN WITH RADICULOPATHY AFFECTING LEFT LOWER EXTREMITY: ICD-10-CM

## 2019-03-05 DIAGNOSIS — G60.8 IDIOPATHIC SMALL AND LARGE FIBER SENSORY NEUROPATHY: ICD-10-CM

## 2019-03-05 RX ORDER — DULOXETIN HYDROCHLORIDE 20 MG/1
20 CAPSULE, DELAYED RELEASE ORAL DAILY
Qty: 30 CAP | Refills: 11 | Status: SHIPPED | OUTPATIENT
Start: 2019-03-05 | End: 2019-05-02 | Stop reason: ALTCHOICE

## 2019-03-05 NOTE — PATIENT INSTRUCTIONS

## 2019-03-05 NOTE — LETTER
3/5/2019 9:41 PM 
 
Patient:  Kim Adorno YOB: 1935 Date of Visit: 3/5/2019 Dear No Recipients: Thank you for referring Mr. Kim Adorno to me for evaluation/treatment. Below are the relevant portions of my assessment and plan of care. Consult Subjective:  
 
Kim Adorno is a 80 y.o. Right-handed  male seen for evaluation at the request of Dr. Celine Rubinstein, of a new problem of having more numbness in his feet, and more difficulty with slowness and moving and generalized pain and fatigue and lack of energy and exertional tolerance to seem to be worse over the last 2-3 months. Not sure what brought this on, but he has not been able to exercise much because his back is been so bad. He has seen back surgeon and pain management physicians and then not able to do too much for his back and is markedly limited by that. He has a new AFO brace on his right leg for his old chronic L5 radiculopathy, that seems to be doing a little bit better. He walks very slowly with a marked limp. He has not had any recent metabolic studies done so we ordered a bunch today looking for any treatable cause of his lack of energy, and checking his metabolic parameters for neuropathy, and have encouraged him to try to remain mentally and physically active. He thinks he is intolerant of Cymbalta, but does take gabapentin 600 mg 2-3 times a day for his neuropathic pain. If he tries to take it any more than that he gets drowsy and sleepy and does not think he can increase it. He never really tried to 20 mg dose, but is willing to try it now so a new prescription was given to the patient for that. He was encouraged to make sure he takes it with food. The last hemoglobin A1c I see was 6.3 done 2 years ago.   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 He takes 1-1/2 50 mg trazodone at nighttime. Patient's EMG study was done in December 2016 and also showed a chronic L5 radiculopathy on the right and left side and the right borderline carpal tunnel syndrome on the right side. He has a post lumbar laminectomy syndrome with chronic pain. We suggested he see Dr. Rolf Bourne continue his pain management, or could refer him to [facility] [place] for pain management if he is unhappy. Patient was initially seen 12 months ago for evaluation of sudden loss of vision with a left peripheral visual field cut, that resolved spontaneously on its own and MRI scans did not show any clear stroke and he had no intracranial or extracranial significant vascular disease. He is stable on 81 mg of aspirin every day and not had a recurrent stroke like symptoms. His carotid Doppler study showed no significant stenosis last visit, but he has had no recurrent Dopplers in 2 years to we will repeat that next week. . No other retinal cause for his visual loss was found. He has no major cognitive issues, but does have some mild recent memory loss at times, but does not really feel it needs an evaluation, and his wife initially seemed concerned but then says she doesn't think he needs an evaluation either. Patient has no known history of previous stroke, no significant headache except a mild chronic generalized headache ever since the fall, no prior history of visual loss, and no other new focal weakness or sensory loss except for the numbness in his feet. Patient has no jaw claudication or other PMR symptoms. Past Medical History:  
Diagnosis Date  Arthritis   
 osteoarthritis  Cancer ([facility] [address]   
 prostate, basal cell CA nose, bladder CA  
  Diabetes ([address] borderline  GERD (gastroesophageal reflux disease)  Insomnia  Selenium deficiency Past Surgical History:  
Procedure Laterality Date  HX HEENT  2009 Right cataract  HX ORTHOPAEDIC  2009 Rt rotator cuff  HX ORTHOPAEDIC  1/2012 Left carpal tunnel  HX ORTHOPAEDIC    
 cervical spine spacers placed  HX ORTHOPAEDIC  [id number]  
 lumbar  HX OTHER SURGICAL  10/2011  
 basal cell CA nose  HX PROSTATECTOMY  1998  
 due to CA  
 HX UROLOGICAL    
 skin ca inside of my bladder,  
 
Family History Problem Relation Age of Onset Butt Other Mother   
     old age  Other Father   
     typhoid fever  Hypertension Brother  Asthma Brother  Cancer Brother   
     esophageal  
 Diabetes Brother  Arthritis-osteo Brother Social History Tobacco Use  Smoking status: Never Smoker  Smokeless tobacco: Never Used  Tobacco comment: chewing tobacco quit years ago Substance Use Topics  Alcohol use: Yes Alcohol/week: 1.2 oz Types: 2 Glasses of wine per week Comment: occasionally Current Outpatient Medications:  
  DULoxetine (CYMBALTA) 20 mg capsule, Take 1 Cap by mouth daily. , Disp: 30 Cap, Rfl: 11 
  gabapentin (NEURONTIN) 600 mg tablet, Take 1 Tab by mouth three (3) times daily. (Patient taking differently: Take 600 mg by mouth two (2) times a day.), Disp: 270 Tab, Rfl: 1   RESTASIS 0.05 % ophthalmic emulsion, , Disp: , Rfl:  
  SILDENAFIL CITRATE (VIAGRA PO), Take  by mouth., Disp: , Rfl:  
  celecoxib (CELEBREX) 200 mg capsule, 200 mg daily. , Disp: , Rfl:  
  lisinopril (PRINIVIL, ZESTRIL) 10 mg tablet, 10 mg daily. , Disp: , Rfl:  
  traZODone (DESYREL) 50 mg tablet, Takes one and 1/2 tab, Disp: , Rfl:  
  Potassium Gluconate 595 mg (99 mg) tablet, Take  by mouth three (3) times daily (with meals). , Disp: , Rfl:  
  copper gluconate 2 mg tab tablet, Take  by mouth., Disp: , Rfl:  
   FOLIC ACID PO, Take  by mouth., Disp: , Rfl:  
  SALMON OIL/OMEGA-3 FATTY ACIDS (SALMON OIL-1000 PO), Take  by mouth., Disp: , Rfl:  
  SACCHAROMYCES BOULARDII (PROBIOTIC, S.BOULARDII, PO), Take  by mouth., Disp: , Rfl:  
  aspirin delayed-release 81 mg tablet, Take  by mouth daily. , Disp: , Rfl:  
  omeprazole (PRILOSEC) 40 mg capsule, Take 40 mg by mouth daily. , Disp: , Rfl:  
  cyanocobalamin (VITAMIN B-12) 1,000 mcg tablet, Take 1,000 mcg by mouth daily. Indications: PREVENTION OF VITAMIN B12 DEFICIENCY, Disp: , Rfl:  
  pyridoxine (VITAMIN B-6) 200 mg tablet, Take 400 mg by mouth daily. , Disp: , Rfl:  
  ergocalciferol, vitamin d2, (VITAMIN D) 400 unit Cap, Take 200 Units by mouth daily. , Disp: , Rfl:  
  nicotinic acid (NIACIN) 500 mg tablet, Take 500 mg by mouth Daily (before breakfast). , Disp: , Rfl:  
  GLUCOSAMINE HCL/CHONDR WELDON A NA (GLUCOSAMINE-CHONDROITIN) 750-600 mg Tab, Take 1,500 mg by mouth two (2) times a day.  , Disp: , Rfl:  
  multivitamin (ONE A DAY) tablet, Take 1 Tab by mouth daily. , Disp: , Rfl:  
 
 
 
Allergies Allergen Reactions  Sulfa (Sulfonamide Antibiotics) Hives Review of Systems: A comprehensive review of systems was negative except for: Eyes: positive for visual disturbance Musculoskeletal: positive for myalgias, arthralgias, stiff joints and back pain Neurological: positive for headaches, memory problems, paresthesia and gait problems Vitals:  
 03/05/19 5681 BP: 128/70 Pulse: 78 Resp: 16 SpO2: 99% Weight: 158 lb (71.7 kg) Height: 5' 7\" (1.702 m) Objective: I 
 
 
NEUROLOGICAL EXAM: 
 
Appearance: The patient is well developed, well nourished, provides a coherent history and is in no acute distress. Mental Status: Oriented to time, place and person, and the president, cognitive function is a bit slow but probably normal and speech is fluent and no aphasia or dysarthria. Mood and affect appropriate. Cranial Nerves:   Intact visual fields. Fundi are benign. MOISES, EOM's full, no nystagmus, no ptosis. Facial sensation is normal. Corneal reflexes are not tested. Facial movement is symmetric. Hearing is abnormal bilaterally. Palate is midline with normal sternocleidomastoid and trapezius muscles are normal. Tongue is midline. Neck without meningismus or bruits Patient has no temporal artery tenderness or enlargement Motor:  5/5 strength in upper and lower proximal and distal muscles except for a foot drop on the left side from his previous back problems, and trace foot drop on the right. Normal bulk and tone. No fasciculations. Patient has very stiff lower lumbar spine with muscle spasms present and decreased range of motion Reflexes:   Deep tendon reflexes 1+/4 and symmetrical, absent ankle jerks bilaterally. No babinski or clonus present Sensory:   Abnormal to touch, pinprick and vibration and temperature in both feet to midcalf level. DSS is intact Gait:  Abnormal gait because of his back pain he moves slowly. Tremor:   No tremor noted. Cerebellar:  Mildly abnormal Romberg and tandem cerebellar signs present. Neurovascular:  Normal heart sounds and regular rhythm, peripheral pulses decreased, and no carotid bruits. Assessment: ICD-10-CM ICD-9-CM 1. B12 deficiency E53.8 266.2 DULoxetine (CYMBALTA) 20 mg capsule VITAMIN B12 & FOLATE  
   TSH 3RD GENERATION  
   CK SED RATE (ESR) MYASTHENIA GRAVIS EVALUATION  
   CBC WITH AUTOMATED DIFF  
   HEMOGLOBIN A1C WITH EAG  
   METABOLIC PANEL, COMPREHENSIVE 2. Lumbar back pain with radiculopathy affecting left lower extremity M54.16 724.4 DULoxetine (CYMBALTA) 20 mg capsule VITAMIN B12 & FOLATE  
   TSH 3RD GENERATION  
   CK SED RATE (ESR)    MYASTHENIA GRAVIS EVALUATION  
   CBC WITH AUTOMATED DIFF  
   HEMOGLOBIN A1C WITH EAG  
   METABOLIC PANEL, COMPREHENSIVE  
 3. Diabetic peripheral neuropathy associated with type 2 diabetes mellitus (HCC) E11.42 250.60 DULoxetine (CYMBALTA) 20 mg capsule  
  357.2 VITAMIN B12 & FOLATE  
   TSH 3RD GENERATION  
   CK SED RATE (ESR) MYASTHENIA GRAVIS EVALUATION  
   CBC WITH AUTOMATED DIFF  
   HEMOGLOBIN A1C WITH EAG  
   METABOLIC PANEL, COMPREHENSIVE 4. Idiopathic small and large fiber sensory neuropathy G60.8 356.4 DULoxetine (CYMBALTA) 20 mg capsule VITAMIN B12 & FOLATE  
   TSH 3RD GENERATION  
   CK SED RATE (ESR) MYASTHENIA GRAVIS EVALUATION  
   CBC WITH AUTOMATED DIFF  
   HEMOGLOBIN A1C WITH EAG  
   METABOLIC PANEL, COMPREHENSIVE 5. Memory change R41.3 780.93 DULoxetine (CYMBALTA) 20 mg capsule VITAMIN B12 & FOLATE  
   TSH 3RD GENERATION  
   CK SED RATE (ESR) MYASTHENIA GRAVIS EVALUATION  
   CBC WITH AUTOMATED DIFF  
   HEMOGLOBIN A1C WITH EAG  
   METABOLIC PANEL, COMPREHENSIVE 6. Lumbar post-laminectomy syndrome M96.1 722.83 DULoxetine (CYMBALTA) 20 mg capsule VITAMIN B12 & FOLATE  
   TSH 3RD GENERATION  
   CK SED RATE (ESR) MYASTHENIA GRAVIS EVALUATION  
   CBC WITH AUTOMATED DIFF  
   HEMOGLOBIN A1C WITH EAG  
   METABOLIC PANEL, COMPREHENSIVE 7. Cervical post-laminectomy syndrome M96.1 722.81 DULoxetine (CYMBALTA) 20 mg capsule VITAMIN B12 & FOLATE  
   TSH 3RD GENERATION  
   CK SED RATE (ESR) MYASTHENIA GRAVIS EVALUATION  
   CBC WITH AUTOMATED DIFF  
   HEMOGLOBIN A1C WITH EAG  
   METABOLIC PANEL, COMPREHENSIVE 8. Left homonymous hemianopsia H53.462 368.46 DULoxetine (CYMBALTA) 20 mg capsule VITAMIN B12 & FOLATE  
   TSH 3RD GENERATION  
   CK SED RATE (ESR) MYASTHENIA GRAVIS EVALUATION  
   CBC WITH AUTOMATED DIFF  
   HEMOGLOBIN A1C WITH EAG  
   METABOLIC PANEL, COMPREHENSIVE 9. Presbycusis of both ears H91.13 388.01 DULoxetine (CYMBALTA) 20 mg capsule    VITAMIN B12 & FOLATE  
   TSH 3RD GENERATION  
   CK  
 SED RATE (ESR) MYASTHENIA GRAVIS EVALUATION  
   CBC WITH AUTOMATED DIFF  
   HEMOGLOBIN A1C WITH EAG  
   METABOLIC PANEL, COMPREHENSIVE 10. Bilateral carotid artery stenosis I65.23 433.10 DULoxetine (CYMBALTA) 20 mg capsule 433.30 VITAMIN B12 & FOLATE  
   TSH 3RD GENERATION  
   CK SED RATE (ESR) MYASTHENIA GRAVIS EVALUATION  
   CBC WITH AUTOMATED DIFF  
   HEMOGLOBIN A1C WITH EAG  
   METABOLIC PANEL, COMPREHENSIVE 11. Hypothyroidism due to acquired atrophy of thyroid E03.4 244.8 DULoxetine (CYMBALTA) 20 mg capsule 246.8 VITAMIN B12 & FOLATE  
   TSH 3RD GENERATION  
   CK SED RATE (ESR) MYASTHENIA GRAVIS EVALUATION  
   CBC WITH AUTOMATED DIFF  
   HEMOGLOBIN A1C WITH EAG  
   METABOLIC PANEL, COMPREHENSIVE Plan:  
 
Patient with new problem of increasing generalized fatigue, weakness, lack of energy of unclear etiology, and slight worsening of his neuropathy, so multiple metabolic panel checked today to rule out any treatable causes for these things, but feel that most likely is the worsening of his diabetic neuropathy, worsening of his diabetes, and his inability to exercise due to his chronic post lumbar laminectomy syndrome and chronic pain We will try him back on Cymbalta since he never really tried it at the lowest dose possible 20 mg with supper, and he will continue his Neurontin 600 mg 2-3 times a day as tolerated and watch for sedation. He says he cannot increase the dose because he is too fatigued and tired Patient also sleeping pills, and is sleeping better just on the trazodone after we advised him of the increased risk of death with sleeping pills He also has a chronic bilateral L5 radiculopathy with dorsiflexor weakness right side side greater with new AFO brace which helps him walk on the right He is also to continue multivitamins and vitamin D on a regular basis remained between physically active Patient had a complicated history of some visual loss on the left visual field after head injury, that seems better already, an MRI scan shows no clear stroke as a cause Carotid Dopplers showed no significant stenosis at last visit 7 months ago Sedimentation rate to rule out temporal arteritis was normal 
He is encouraged to take a baby aspirin every day, multivitamin every day, and vitamin D every day. He has remained mentally and physically active 35 minutes spent with the patient, reviewing his records on the computer, reviewing his labs, reviewing his EMG and labs computer system today, and deciding he most likely has a diabetic neuropathy. He may have some mild cognitive impairment but we will defer workup for that at this time as requested by the patient Followup in 6 months, earlier if needed and they will call of any problem in the interim Signed By: Francheska Mendosa MD   
 March 5, 2019 This note will not be viewable in [address] If you have questions, please do not hesitate to call me. I look forward to following Mr. Nicole Almodovar along with you. Sincerely, Francheska Mendosa MD

## 2019-03-06 NOTE — PROGRESS NOTES
Consult    Subjective:     Poornima Boston is a 80 y.o. Right-handed  male seen for evaluation at the request of Dr. Willy Tuttle, of a new problem of having more numbness in his feet, and more difficulty with slowness and moving and generalized pain and fatigue and lack of energy and exertional tolerance to seem to be worse over the last 2-3 months. Not sure what brought this on, but he has not been able to exercise much because his back is been so bad. He has seen back surgeon and pain management physicians and then not able to do too much for his back and is markedly limited by that. He has a new AFO brace on his right leg for his old chronic L5 radiculopathy, that seems to be doing a little bit better. He walks very slowly with a marked limp. He has not had any recent metabolic studies done so we ordered a bunch today looking for any treatable cause of his lack of energy, and checking his metabolic parameters for neuropathy, and have encouraged him to try to remain mentally and physically active. He thinks he is intolerant of Cymbalta, but does take gabapentin 600 mg 2-3 times a day for his neuropathic pain. If he tries to take it any more than that he gets drowsy and sleepy and does not think he can increase it. He never really tried to 20 mg dose, but is willing to try it now so a new prescription was given to the patient for that. He was encouraged to make sure he takes it with food. The last hemoglobin A1c I see was 6.3 done 2 years ago. 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 He takes 1-1/2 50 mg trazodone at nighttime.   Patient's EMG study was done in December 2016 and also showed a chronic L5 radiculopathy on the right and left side and the right borderline carpal tunnel syndrome on the right side. He has a post lumbar laminectomy syndrome with chronic pain. We suggested he see Dr. Cherelle Blake continue his pain management, or could refer him to [facility] [place] for pain management if he is unhappy. Patient was initially seen 12 months ago for evaluation of sudden loss of vision with a left peripheral visual field cut, that resolved spontaneously on its own and MRI scans did not show any clear stroke and he had no intracranial or extracranial significant vascular disease. He is stable on 81 mg of aspirin every day and not had a recurrent stroke like symptoms. His carotid Doppler study showed no significant stenosis last visit, but he has had no recurrent Dopplers in 2 years to we will repeat that next week. . No other retinal cause for his visual loss was found. He has no major cognitive issues, but does have some mild recent memory loss at times, but does not really feel it needs an evaluation, and his wife initially seemed concerned but then says she doesn't think he needs an evaluation either. Patient has no known history of previous stroke, no significant headache except a mild chronic generalized headache ever since the fall, no prior history of visual loss, and no other new focal weakness or sensory loss except for the numbness in his feet. Patient has no jaw claudication or other PMR symptoms.     Past Medical History:   Diagnosis Date    Arthritis     osteoarthritis    Cancer ([address]     prostate, basal cell CA nose, bladder CA    Diabetes (HCC)     borderline    GERD (gastroesophageal reflux disease)     Insomnia     Selenium deficiency       Past Surgical History:   Procedure Laterality Date    HX HEENT  2009    Right cataract    HX ORTHOPAEDIC  2009    Rt rotator cuff    HX ORTHOPAEDIC  1/2012    Left carpal tunnel    HX ORTHOPAEDIC cervical spine spacers placed    HX ORTHOPAEDIC  7-2012    lumbar    HX OTHER SURGICAL  10/2011    basal cell CA nose    HX PROSTATECTOMY  1998    due to CA    HX UROLOGICAL      skin ca inside of my bladder,     Family History   Problem Relation Age of Onset    Other Mother         old age   [facility] Other Father         typhoid fever    Hypertension Brother     Asthma Brother     Cancer Brother         esophageal    Diabetes Brother     Arthritis-osteo Brother       Social History     Tobacco Use    Smoking status: Never Smoker    Smokeless tobacco: Never Used    Tobacco comment: chewing tobacco quit years ago   Substance Use Topics    Alcohol use: Yes     Alcohol/week: 1.2 oz     Types: 2 Glasses of wine per week     Comment: occasionally         Current Outpatient Medications:     DULoxetine (CYMBALTA) 20 mg capsule, Take 1 Cap by mouth daily. , Disp: 30 Cap, Rfl: 11    gabapentin (NEURONTIN) 600 mg tablet, Take 1 Tab by mouth three (3) times daily. (Patient taking differently: Take 600 mg by mouth two (2) times a day.), Disp: 270 Tab, Rfl: 1    RESTASIS 0.05 % ophthalmic emulsion, , Disp: , Rfl:     SILDENAFIL CITRATE (VIAGRA PO), Take  by mouth., Disp: , Rfl:     celecoxib (CELEBREX) 200 mg capsule, 200 mg daily. , Disp: , Rfl:     lisinopril (PRINIVIL, ZESTRIL) 10 mg tablet, 10 mg daily. , Disp: , Rfl:     traZODone (DESYREL) 50 mg tablet, Takes one and 1/2 tab, Disp: , Rfl:     Potassium Gluconate 595 mg (99 mg) tablet, Take  by mouth three (3) times daily (with meals). , Disp: , Rfl:     copper gluconate 2 mg tab tablet, Take  by mouth., Disp: , Rfl:     FOLIC ACID PO, Take  by mouth., Disp: , Rfl:     SALMON OIL/OMEGA-3 FATTY ACIDS (SALMON OIL-1000 PO), Take  by mouth., Disp: , Rfl:     SACCHAROMYCES BOULARDII (PROBIOTIC, S.BOULARDII, PO), Take  by mouth., Disp: , Rfl:     aspirin delayed-release 81 mg tablet, Take  by mouth daily. , Disp: , Rfl:     omeprazole (PRILOSEC) 40 mg capsule, Take 40 mg by mouth daily. , Disp: , Rfl:     cyanocobalamin (VITAMIN B-12) 1,000 mcg tablet, Take 1,000 mcg by mouth daily. Indications: PREVENTION OF VITAMIN B12 DEFICIENCY, Disp: , Rfl:     pyridoxine (VITAMIN B-6) 200 mg tablet, Take 400 mg by mouth daily. , Disp: , Rfl:     ergocalciferol, vitamin d2, (VITAMIN D) 400 unit Cap, Take 200 Units by mouth daily. , Disp: , Rfl:     nicotinic acid (NIACIN) 500 mg tablet, Take 500 mg by mouth Daily (before breakfast). , Disp: , Rfl:     GLUCOSAMINE HCL/CHONDR WELDON A NA (GLUCOSAMINE-CHONDROITIN) 750-600 mg Tab, Take 1,500 mg by mouth two (2) times a day.  , Disp: , Rfl:     multivitamin (ONE A DAY) tablet, Take 1 Tab by mouth daily. , Disp: , Rfl:         Allergies   Allergen Reactions    Sulfa (Sulfonamide Antibiotics) Hives        Review of Systems:  A comprehensive review of systems was negative except for: Eyes: positive for visual disturbance  Musculoskeletal: positive for myalgias, arthralgias, stiff joints and back pain  Neurological: positive for headaches, memory problems, paresthesia and gait problems   Vitals:    03/05/19 0849   BP: 128/70   Pulse: 78   Resp: 16   SpO2: 99%   Weight: 158 lb (71.7 kg)   Height: 5' 7\" (1.702 m)     Objective:     I      NEUROLOGICAL EXAM:    Appearance: The patient is well developed, well nourished, provides a coherent history and is in no acute distress. Mental Status: Oriented to time, place and person, and the president, cognitive function is a bit slow but probably normal and speech is fluent and no aphasia or dysarthria. Mood and affect appropriate. Cranial Nerves:   Intact visual fields. Fundi are benign. MOISES, EOM's full, no nystagmus, no ptosis. Facial sensation is normal. Corneal reflexes are not tested. Facial movement is symmetric. Hearing is abnormal bilaterally. Palate is midline with normal sternocleidomastoid and trapezius muscles are normal. Tongue is midline.   Neck without meningismus or bruits  Patient has no temporal artery tenderness or enlargement   Motor:  5/5 strength in upper and lower proximal and distal muscles except for a foot drop on the left side from his previous back problems, and trace foot drop on the right. Normal bulk and tone. No fasciculations. Patient has very stiff lower lumbar spine with muscle spasms present and decreased range of motion   Reflexes:   Deep tendon reflexes 1+/4 and symmetrical, absent ankle jerks bilaterally. No babinski or clonus present   Sensory:   Abnormal to touch, pinprick and vibration and temperature in both feet to midcalf level. DSS is intact   Gait:  Abnormal gait because of his back pain he moves slowly. Tremor:   No tremor noted. Cerebellar:  Mildly abnormal Romberg and tandem cerebellar signs present. Neurovascular:  Normal heart sounds and regular rhythm, peripheral pulses decreased, and no carotid bruits. Assessment:       ICD-10-CM ICD-9-CM    1. B12 deficiency E53.8 266.2 DULoxetine (CYMBALTA) 20 mg capsule      VITAMIN B12 & FOLATE      TSH 3RD GENERATION      CK      SED RATE (ESR)      MYASTHENIA GRAVIS EVALUATION      CBC WITH AUTOMATED DIFF      HEMOGLOBIN A1C WITH EAG      METABOLIC PANEL, COMPREHENSIVE   2. Lumbar back pain with radiculopathy affecting left lower extremity M54.16 724.4 DULoxetine (CYMBALTA) 20 mg capsule      VITAMIN B12 & FOLATE      TSH 3RD GENERATION      CK      SED RATE (ESR)      MYASTHENIA GRAVIS EVALUATION      CBC WITH AUTOMATED DIFF      HEMOGLOBIN A1C WITH EAG      METABOLIC PANEL, COMPREHENSIVE   3. Diabetic peripheral neuropathy associated with type 2 diabetes mellitus (HCC) E11.42 250.60 DULoxetine (CYMBALTA) 20 mg capsule     357.2 VITAMIN B12 & FOLATE      TSH 3RD GENERATION      CK      SED RATE (ESR)      MYASTHENIA GRAVIS EVALUATION      CBC WITH AUTOMATED DIFF      HEMOGLOBIN A1C WITH EAG      METABOLIC PANEL, COMPREHENSIVE   4.  Idiopathic small and large fiber sensory neuropathy G60.8 356.4 DULoxetine (CYMBALTA) 20 mg capsule      VITAMIN B12 & FOLATE      TSH 3RD GENERATION      CK      SED RATE (ESR)      MYASTHENIA GRAVIS EVALUATION      CBC WITH AUTOMATED DIFF      HEMOGLOBIN A1C WITH EAG      METABOLIC PANEL, COMPREHENSIVE   5. Memory change R41.3 780.93 DULoxetine (CYMBALTA) 20 mg capsule      VITAMIN B12 & FOLATE      TSH 3RD GENERATION      CK      SED RATE (ESR)      MYASTHENIA GRAVIS EVALUATION      CBC WITH AUTOMATED DIFF      HEMOGLOBIN A1C WITH EAG      METABOLIC PANEL, COMPREHENSIVE   6. Lumbar post-laminectomy syndrome M96.1 722.83 DULoxetine (CYMBALTA) 20 mg capsule      VITAMIN B12 & FOLATE      TSH 3RD GENERATION      CK      SED RATE (ESR)      MYASTHENIA GRAVIS EVALUATION      CBC WITH AUTOMATED DIFF      HEMOGLOBIN A1C WITH EAG      METABOLIC PANEL, COMPREHENSIVE   7. Cervical post-laminectomy syndrome M96.1 722.81 DULoxetine (CYMBALTA) 20 mg capsule      VITAMIN B12 & FOLATE      TSH 3RD GENERATION      CK      SED RATE (ESR)      MYASTHENIA GRAVIS EVALUATION      CBC WITH AUTOMATED DIFF      HEMOGLOBIN A1C WITH EAG      METABOLIC PANEL, COMPREHENSIVE   8. Left homonymous hemianopsia H53.462 368.46 DULoxetine (CYMBALTA) 20 mg capsule      VITAMIN B12 & FOLATE      TSH 3RD GENERATION      CK      SED RATE (ESR)      MYASTHENIA GRAVIS EVALUATION      CBC WITH AUTOMATED DIFF      HEMOGLOBIN A1C WITH EAG      METABOLIC PANEL, COMPREHENSIVE   9.  Presbycusis of both ears H91.13 388.01 DULoxetine (CYMBALTA) 20 mg capsule      VITAMIN B12 & FOLATE      TSH 3RD GENERATION      CK      SED RATE (ESR)      MYASTHENIA GRAVIS EVALUATION      CBC WITH AUTOMATED DIFF      HEMOGLOBIN A1C WITH EAG      METABOLIC PANEL, COMPREHENSIVE   10. Bilateral carotid artery stenosis I65.23 433.10 DULoxetine (CYMBALTA) 20 mg capsule     433.30 VITAMIN B12 & FOLATE      TSH 3RD GENERATION      CK      SED RATE (ESR)      MYASTHENIA GRAVIS EVALUATION      CBC WITH AUTOMATED DIFF      HEMOGLOBIN A1C WITH EAG      METABOLIC PANEL, COMPREHENSIVE   11. Hypothyroidism due to acquired atrophy of thyroid E03.4 244.8 DULoxetine (CYMBALTA) 20 mg capsule     246.8 VITAMIN B12 & FOLATE      TSH 3RD GENERATION      CK      SED RATE (ESR)      MYASTHENIA GRAVIS EVALUATION      CBC WITH AUTOMATED DIFF      HEMOGLOBIN A1C WITH EAG      METABOLIC PANEL, COMPREHENSIVE         Plan:     Patient with new problem of increasing generalized fatigue, weakness, lack of energy of unclear etiology, and slight worsening of his neuropathy, so multiple metabolic panel checked today to rule out any treatable causes for these things, but feel that most likely is the worsening of his diabetic neuropathy, worsening of his diabetes, and his inability to exercise due to his chronic post lumbar laminectomy syndrome and chronic pain  We will try him back on Cymbalta since he never really tried it at the lowest dose possible 20 mg with supper, and he will continue his Neurontin 600 mg 2-3 times a day as tolerated and watch for sedation. He says he cannot increase the dose because he is too fatigued and tired  Patient also sleeping pills, and is sleeping better just on the trazodone after we advised him of the increased risk of death with sleeping pills  He also has a chronic bilateral L5 radiculopathy with dorsiflexor weakness right side side greater with new AFO brace which helps him walk on the right  He is also to continue multivitamins and vitamin D on a regular basis remained between physically active  Patient had a complicated history of some visual loss on the left visual field after head injury, that seems better already, an MRI scan shows no clear stroke as a cause  Carotid Dopplers showed no significant stenosis at last visit 7 months ago  Sedimentation rate to rule out temporal arteritis was normal  He is encouraged to take a baby aspirin every day, multivitamin every day, and vitamin D every day.  He has remained mentally and physically active  35 minutes spent with the patient, reviewing his records on the computer, reviewing his labs, reviewing his EMG and labs computer system today, and deciding he most likely has a diabetic neuropathy. He may have some mild cognitive impairment but we will defer workup for that at this time as requested by the patient   Followup in 6 months, earlier if needed and they will call of any problem in the interim    Signed By: Kai Cleaning MD     March 5, 2019       This note will not be viewable in [address]

## 2019-03-07 LAB
ACHR BIND AB SER-SCNC: <0.03 NMOL/L (ref 0–0.24)
ALBUMIN SERPL-MCNC: 4.3 G/DL (ref 3.5–4.7)
ALBUMIN/GLOB SERPL: 2.2 {RATIO} (ref 1.2–2.2)
ALP SERPL-CCNC: 61 IU/L (ref 39–117)
ALT SERPL-CCNC: 12 IU/L (ref 0–44)
AST SERPL-CCNC: 15 IU/L (ref 0–40)
BASOPHILS # BLD AUTO: 0 X10E3/UL (ref 0–0.2)
BASOPHILS NFR BLD AUTO: 0 %
BILIRUB SERPL-MCNC: 0.3 MG/DL (ref 0–1.2)
BUN SERPL-MCNC: 16 MG/DL (ref 8–27)
BUN/CREAT SERPL: 13 (ref 10–24)
CALCIUM SERPL-MCNC: 9.3 MG/DL (ref 8.6–10.2)
CHLORIDE SERPL-SCNC: 101 MMOL/L (ref 96–106)
CK SERPL-CCNC: 86 U/L (ref 24–204)
CO2 SERPL-SCNC: 26 MMOL/L (ref 20–29)
CREAT SERPL-MCNC: 1.2 MG/DL (ref 0.76–1.27)
EOSINOPHIL # BLD AUTO: 0.1 X10E3/UL (ref 0–0.4)
EOSINOPHIL NFR BLD AUTO: 2 %
ERYTHROCYTE [DISTWIDTH] IN BLOOD BY AUTOMATED COUNT: 12.7 % (ref 12.3–15.4)
ERYTHROCYTE [SEDIMENTATION RATE] IN BLOOD BY WESTERGREN METHOD: 3 MM/HR (ref 0–30)
EST. AVERAGE GLUCOSE BLD GHB EST-MCNC: 114 MG/DL
FOLATE SERPL-MCNC: >20 NG/ML
GLOBULIN SER CALC-MCNC: 2 G/DL (ref 1.5–4.5)
GLUCOSE SERPL-MCNC: 69 MG/DL (ref 65–99)
HBA1C MFR BLD: 5.6 % (ref 4.8–5.6)
HCT VFR BLD AUTO: 39 % (ref 37.5–51)
HGB BLD-MCNC: 13.5 G/DL (ref 13–17.7)
IMM GRANULOCYTES # BLD AUTO: 0 X10E3/UL (ref 0–0.1)
IMM GRANULOCYTES NFR BLD AUTO: 0 %
LYMPHOCYTES # BLD AUTO: 1.9 X10E3/UL (ref 0.7–3.1)
LYMPHOCYTES NFR BLD AUTO: 32 %
MCH RBC QN AUTO: 32.3 PG (ref 26.6–33)
MCHC RBC AUTO-ENTMCNC: 34.6 G/DL (ref 31.5–35.7)
MCV RBC AUTO: 93 FL (ref 79–97)
MONOCYTES # BLD AUTO: 0.5 X10E3/UL (ref 0.1–0.9)
MONOCYTES NFR BLD AUTO: 9 %
NEUTROPHILS # BLD AUTO: 3.3 X10E3/UL (ref 1.4–7)
NEUTROPHILS NFR BLD AUTO: 57 %
PLATELET # BLD AUTO: 253 X10E3/UL (ref 150–379)
POTASSIUM SERPL-SCNC: 4.7 MMOL/L (ref 3.5–5.2)
PROT SERPL-MCNC: 6.3 G/DL (ref 6–8.5)
RBC # BLD AUTO: 4.18 X10E6/UL (ref 4.14–5.8)
SODIUM SERPL-SCNC: 138 MMOL/L (ref 134–144)
STRIA MUS AB TITR SER IF: NEGATIVE {TITER}
TSH SERPL DL<=0.005 MIU/L-ACNC: 1.85 UIU/ML (ref 0.45–4.5)
VIT B12 SERPL-MCNC: 884 PG/ML (ref 232–1245)
WBC # BLD AUTO: 5.9 X10E3/UL (ref 3.4–10.8)

## 2019-03-14 RX ORDER — ZINC GLUCONATE 10 MG
1 LOZENGE ORAL DAILY
COMMUNITY

## 2019-03-14 NOTE — PERIOP NOTES
[facility]  Ambulatory Surgery Unit  Pre-operative Instructions    Procedure Date  3/19/19            Tentative Arrival Time 1300      1. On the day of your procedure, please report to the Ambulatory Surgery Unit Registration Desk and sign in at your designated time. The Ambulatory Surgery Unit is located in [facility] on the [facility] side of the [facility] across from the [address] [place] [address]. Please have all of your health insurance cards and a photo ID. 2. You must have someone with you to drive you home as directed by your surgeon. 3. You may have a light breakfast and take normal morning medications. 4. We recommend you do not drink any alcoholic beverages for 24 hours before and after your procedure. 5. Contact your surgeons office for instructions on the following medications: non-steroidal anti-inflammatory drugs (i.e. Advil, Aleve), vitamins, and supplements. (Some surgeons will want you to stop these medications prior to surgery and others may allow you to take them)   **If you are currently taking Plavix, Coumadin, Aspirin and/or other blood-thinning agents, contact your surgeon for instructions. ** Your surgeon will partner with the physician prescribing these medications to determine if it is safe to stop or if you need to continue taking. Please do not stop taking these medications without instructions from your surgeon. 6. In an effort to help prevent surgical site infection, we ask that you shower with an anti-bacterial soap (i.e. Dial or Safeguard) on the morning of your procedure. Do not apply any lotions, powders, or deodorants after showering. 7. Wear comfortable clothes. Wear glasses instead of contacts. Do not bring any jewelry or money (other than copays or fees as instructed). Do not wear make-up, particularly mascara, the morning of your procedure. Wear your hair loose or down, no ponytails, buns, lupillo pins or clips.  All body piercings must be removed. 8. You should understand that if you do not follow these instructions your procedure may be cancelled. If your physical condition changes (i.e. fever, cold or flu) please contact your surgeon as soon as possible. 9. It is important that you be on time. If a situation occurs where you may be late, or if you have any questions or problems, please call [phone].    10. Your procedure time may be subject to change. You will receive a phone call the day prior to confirm your arrival time. I understand a pre-operative phone call will be made to verify my procedure time. In the event that I am not available, I give permission for a message to be left on my answering service and/or with another person?       yes    Pre-op instructions given to pt over the phone and he verbalized an understanding     ___________________      ___________________      ___________________  (Signature of Patient)          (Witness)                   (Date and Time)

## 2019-03-19 ENCOUNTER — APPOINTMENT (OUTPATIENT)
Dept: GENERAL RADIOLOGY | Age: 84
End: 2019-03-19
Attending: PHYSICAL MEDICINE & REHABILITATION
Payer: MEDICARE

## 2019-03-19 ENCOUNTER — HOSPITAL ENCOUNTER (OUTPATIENT)
Age: 84
Setting detail: OUTPATIENT SURGERY
Discharge: HOME | End: 2019-03-19
Attending: PHYSICAL MEDICINE & REHABILITATION | Admitting: PHYSICAL MEDICINE & REHABILITATION
Payer: MEDICARE

## 2019-03-19 VITALS
BODY MASS INDEX: 24.17 KG/M2 | SYSTOLIC BLOOD PRESSURE: 157 MMHG | RESPIRATION RATE: 18 BRPM | TEMPERATURE: 98.5 F | HEIGHT: 67 IN | OXYGEN SATURATION: 99 % | DIASTOLIC BLOOD PRESSURE: 80 MMHG | HEART RATE: 52 BPM | WEIGHT: 154 LBS

## 2019-03-19 PROCEDURE — 74011250636 HC RX REV CODE- 250/636: Performed by: PHYSICAL MEDICINE & REHABILITATION

## 2019-03-19 PROCEDURE — 76030000002 HC AMB SURG OR TIME FIRST 0.: Performed by: PHYSICAL MEDICINE & REHABILITATION

## 2019-03-19 PROCEDURE — 77030003665 HC NDL SPN BBMI -A: Performed by: PHYSICAL MEDICINE & REHABILITATION

## 2019-03-19 PROCEDURE — 76000 FLUOROSCOPY <1 HR PHYS/QHP: CPT

## 2019-03-19 PROCEDURE — 76210000046 HC AMBSU PH II REC FIRST 0.5 HR: Performed by: PHYSICAL MEDICINE & REHABILITATION

## 2019-03-19 PROCEDURE — 74011000250 HC RX REV CODE- 250: Performed by: PHYSICAL MEDICINE & REHABILITATION

## 2019-03-19 PROCEDURE — 74011636320 HC RX REV CODE- 636/320: Performed by: PHYSICAL MEDICINE & REHABILITATION

## 2019-03-19 RX ORDER — LIDOCAINE HYDROCHLORIDE 20 MG/ML
5 INJECTION, SOLUTION INFILTRATION; PERINEURAL ONCE
Status: COMPLETED | OUTPATIENT
Start: 2019-03-19 | End: 2019-03-19

## 2019-03-19 RX ORDER — BUPIVACAINE HYDROCHLORIDE 5 MG/ML
5 INJECTION, SOLUTION EPIDURAL; INTRACAUDAL ONCE
Status: DISCONTINUED | OUTPATIENT
Start: 2019-03-19 | End: 2019-03-19 | Stop reason: HOSPADM

## 2019-03-19 RX ORDER — METHYLPREDNISOLONE ACETATE 40 MG/ML
40 INJECTION, SUSPENSION INTRA-ARTICULAR; INTRALESIONAL; INTRAMUSCULAR; SOFT TISSUE ONCE
Status: COMPLETED | OUTPATIENT
Start: 2019-03-19 | End: 2019-03-19

## 2019-03-19 NOTE — PERIOP NOTES
Pt with positive dorsiflexion and extention LLE, RLE with baseline right foot drop and weakness. 1440: Reviewed pt DC instructions with pt who verbalized understanding with no further questions at this time. Pt tolerated PO fluids without N/V or difficulty swallowing. 1455: pt transported via [address] by nurse to [facility] to be DC to home with family.

## 2019-03-19 NOTE — H&P
Procedural Case Note    3/19/2019    (1:43 PM)    Paloma Rios    1935   (80 y.o.)    [id number]    CC:  pain    ROS:   Complete ROS obtained, no CP, no SOB, no N or V    PMH:     Past Medical History:   Diagnosis Date    Arthritis     osteoarthritis    [address] [address]     prostate, basal cell CA nose, bladder CA    Diabetes (HCC)     borderline    GERD (gastroesophageal reflux disease)     Hypertension     Insomnia     Selenium deficiency        ALLERGIES:     Allergies   Allergen Reactions    Sulfa (Sulfonamide Antibiotics) Hives       MEDS:     Current Facility-Administered Medications   Medication Dose Route Frequency    bupivacaine (PF) (MARCAINE) 0.5 % (5 mg/mL) injection 25 mg  5 mL Epidural ONCE    methylPREDNISolone acetate (DEPO-MEDROL) 40 mg/mL injection 40 mg  40 mg IntraMUSCular ONCE    lidocaine (XYLOCAINE) 20 mg/mL (2 %) injection 100 mg  5 mL IntraDERMal ONCE    iohexol (OMNIPAQUE) 180 mg iodine/mL injection 5 mL  5 mL Intra artICUlar ONCE    sodium bicarbonate (4%) (NEUT) injection 1 mL  1 mL SubCUTAneous ONCE          Visit Vitals  /82 (BP 1 Location: Right arm, BP Patient Position: At rest)   Pulse (!) 54   Temp 97.9 °F (36.6 °C)   Resp 18   Ht 5' 7\" (1.702 m)   Wt 69.9 kg (154 lb)   SpO2 97%   BMI 24.12 kg/m²     PE:  Gen: NAD  Head: normocephalic  Heart: RRR  Lungs: CTA arelis  Abd: NT, ND, soft  Neuro: awake and alert  Skin: intact    IMPRESSION:   LS DDD    Note:  The clinical status was discussed in detail with the patient. The procedure was again discussed and described in detail. All understand and accept the planned procedure and risks; reject other forms of treatment. All questions are answered.     German Hope MD

## 2019-03-19 NOTE — PERIOP NOTES
Permission received to review discharge instructions and discuss private health information with friend Jg Felder

## 2019-03-19 NOTE — OP NOTES
Epidural Steroid Injection Operative Report    Indications: This is a 80 y.o. male who presents with low back pain. He was positive for LS DDD. The patient was admitted for surgery as conservative measures have failed. Date of Surgery: 3/19/2019    Preoperative Diagnosis: LS DDD    Postoperative Diagnosis: LS DDD    Surgeon(s) and Role:     * Leonel Graham MD - Primary     Procedure:  Procedure(s):  RIGHT L4 AND L5 TRANSFORAMINAL EPIDURAL STEROID INJECTION    Procedure in Detail:  After appropriate informed consent was obtained, the patient was taken to the operating suite and placed in the prone position on the operating table on appropriate padding. The LS region was prepped and draped in the usual sterile fashion. Intraoperative fluoroscopy was used to localize the LS spine. The skin was infiltrated with 2% lidocaine. An 22-g needle was advanced into the Right L4 and L5 neuroforamen under fluoroscopic guidance. A small amount of contrast was injected into the epidural space, confirming appropriate needle placement on fluoroscopy. Next, 2ml of 2% lidocaine and 80mg of Depo-Medrol were injected. The needle was removed from the patient. The patient was then turned back into the supine position on the stretcher and was taken to the Recovery Room in stable condition.     Estimated Blood Loss:  none     Specimens: None       Drains: None          Complications:  None    Signed By: Princess Sonia MD                        March 19, 2019

## 2019-03-19 NOTE — PERIOP NOTES
Poornima Ludy  1935  [id number]    Situation:  Verbal report given from: SUDEEP Joshi  Procedure: Procedure(s):  RIGHT L4 AND L5 TRANSFORAMINAL EPIDURAL STEROID INJECTION    Background:    Preoperative diagnosis: DEGENERATIVE DISC DISEASE LUMBAR    Postoperative diagnosis: DEGENERATIVE [address] [place] DISEASE LUMBAR    :  Dr. Gris Leyva    Assistant(s): Circ-1: Imelda Montero RN  Local Nurse Monitor: Debbi Mccloud RN  Scrub Tech-1: Kira Almanzar    Specimens: * No specimens in log *    Assessment:  Intra-procedure medications         Anesthesia gave intra-procedure sedation and medications, see anesthesia flow sheet     Intravenous fluids: LR@ KVO     Vital signs stable       Recommendation:    Permission to share finding with Reba/daughter : yes

## 2019-03-19 NOTE — DISCHARGE INSTRUCTIONS
Dr. Kandi Erickson Discharge Instructions  Transforaminal Epidural Steroid Injection/ Selective Nerve Block    You had a transforaminal epidural steroid injection/ selective nerve block today. You will probably have some numbness, and possibly weakness, in your leg for the next 6 to 8 hours. The steroids will slowly become effective, reducing your pain, over the next 2 weeks. You should begin feeling better after a few days, but it may take up to 2 weeks to notice the difference. The benefit you get from your injection will last a variable amount of time, depending on the severity of your lumbar spine problem.  Pain: Most people do not have any increase in pain after this injection. However, you might experience some soreness in your low back. If this happens, putting an ice pack over the sore area will help.  Bandage: You will have a small bandage covering the site of the injection. You may remove it once you get home.  Restrictions: Someone should drive you home after the injection. After that, you have no restrictions. You need to be careful while walking, as you may still have some numbness or weakness in your leg. You may resume your normal level of activity. You may take a shower or bath, and you may eat normally. You should continue your current exercises and/or therapy routine.   Medications: Continue your current medications as prescribed. If your pain decreases, you may reduce the amount of your pain medicines. If you stopped taking anticoagulants or blood-thinners before the injection, start them tomorrow. If you have diabetes, your blood sugar may be elevated for a few days. Call your primary doctor with any questions.   Call Dr. Kandi Erickson at [phone] if you experience:   Fever (101 degrees Fahrenheit or greater)   Nausea or vomiting   Headache unrelieved by your normal pain medicine   Redness or swelling at the injection site that lasts more than 1 day   New numbness, tingling, weakness, or pain that you didnt have before the injection    Follow-up appointment:   If still having pain in 1-2 weeks, call office at [phone] for a follow up appointment. DISCHARGE SUMMARY from Nurse    The following personal items collected during your admission are returned to you:   Dental Appliance: Dental Appliances: None  Vision:    Hearing Aid:    Jewelry: Jewelry: None  Clothing: Clothing: With patient  Other Valuables: Other Valuables: Wallet(in pt belonging bag)  Valuables sent to safe: If you were given prescriptions, please review the written information on prescribed medications. · You will receive a Post Operative Call from one of the Recovery Room Nurses on the day after your surgery to check on you. It is very important for us to know how you are recovering after your surgery. · You may receive an e-mail or letter in the mail from [employer] regarding your experience with us in the Ambulatory Surgery Unit. Your feedback is valuable to us and we appreciate your participation in the survey. If you have not had your influenza or pneumococcal vaccines, please follow up with your primary care physician. The discharge information has been reviewed with the patient. The patient verbalized understanding.

## 2019-03-19 NOTE — PERIOP NOTES
Skin assessment:   WNL   Skin color: wnl for ethnicity   Skin condition: wnl for age   Skin integrity: wnl for age   Turgor: wnl    Neuro:  Push/Pull assessment:     LUE Response: strong    LLE Response: weak push/weak pull   RUE Response: strong    RLE Response: weak push/weak pull    OB/Gyn assessment:    LMP:     If no menstrual period then reason: male patient

## 2019-03-29 DIAGNOSIS — M54.16 LUMBAR BACK PAIN WITH RADICULOPATHY AFFECTING LEFT LOWER EXTREMITY: ICD-10-CM

## 2019-03-29 DIAGNOSIS — G60.8 IDIOPATHIC SMALL AND LARGE FIBER SENSORY NEUROPATHY: ICD-10-CM

## 2019-03-29 DIAGNOSIS — I63.231 CEREBRAL INFARCTION INVOLVING RIGHT CAROTID ARTERY (HCC): ICD-10-CM

## 2019-03-29 DIAGNOSIS — E11.42 DIABETIC PERIPHERAL NEUROPATHY ASSOCIATED WITH TYPE 2 DIABETES MELLITUS (HCC): ICD-10-CM

## 2019-03-29 DIAGNOSIS — M96.1 LUMBAR POST-LAMINECTOMY SYNDROME: ICD-10-CM

## 2019-03-29 DIAGNOSIS — R41.3 MEMORY CHANGE: ICD-10-CM

## 2019-03-29 DIAGNOSIS — M96.1 CERVICAL POST-LAMINECTOMY SYNDROME: ICD-10-CM

## 2019-03-29 RX ORDER — GABAPENTIN 600 MG/1
TABLET ORAL
Qty: 162 TAB | Refills: 0 | Status: SHIPPED | OUTPATIENT
Start: 2019-03-29 | End: 2019-07-12 | Stop reason: SDUPTHER

## 2019-04-23 ENCOUNTER — HOSPITAL ENCOUNTER (OUTPATIENT)
Dept: CT IMAGING | Age: 84
Discharge: HOME | End: 2019-04-23
Attending: INTERNAL MEDICINE
Payer: MEDICARE

## 2019-04-23 DIAGNOSIS — G44.311 INTRACTABLE ACUTE POST-TRAUMATIC HEADACHE: ICD-10-CM

## 2019-04-23 PROCEDURE — 70450 CT HEAD/BRAIN W/O DYE: CPT

## 2019-04-26 ENCOUNTER — TELEPHONE (OUTPATIENT)
Dept: NEUROLOGY | Age: 84
End: 2019-04-26

## 2019-04-26 NOTE — TELEPHONE ENCOUNTER
Patient was seen by his PCP for a headache that he has had for 3 weeks.  Pt would like to be seen sooner than his Aug apptmt

## 2019-05-01 NOTE — TELEPHONE ENCOUNTER
I called the patient. The night before last, did not hurt, and only a little for the day today.    He is taking Cymbalta and doing ok, but not very well    I put on the schedule for tomorrow

## 2019-05-02 ENCOUNTER — OFFICE VISIT (OUTPATIENT)
Dept: NEUROLOGY | Age: 84
End: 2019-05-02

## 2019-05-02 VITALS
RESPIRATION RATE: 16 BRPM | OXYGEN SATURATION: 96 % | WEIGHT: 155 LBS | DIASTOLIC BLOOD PRESSURE: 52 MMHG | HEART RATE: 83 BPM | BODY MASS INDEX: 24.33 KG/M2 | HEIGHT: 67 IN | SYSTOLIC BLOOD PRESSURE: 112 MMHG

## 2019-05-02 DIAGNOSIS — M54.16 LUMBAR BACK PAIN WITH RADICULOPATHY AFFECTING LEFT LOWER EXTREMITY: ICD-10-CM

## 2019-05-02 DIAGNOSIS — R41.3 MEMORY CHANGE: ICD-10-CM

## 2019-05-02 DIAGNOSIS — I67.89 CEREBRAL MICROVASCULAR DISEASE: ICD-10-CM

## 2019-05-02 DIAGNOSIS — I65.23 BILATERAL CAROTID ARTERY STENOSIS: Primary | ICD-10-CM

## 2019-05-02 DIAGNOSIS — M96.1 LUMBAR POST-LAMINECTOMY SYNDROME: ICD-10-CM

## 2019-05-02 DIAGNOSIS — H53.462 LEFT HOMONYMOUS HEMIANOPSIA: ICD-10-CM

## 2019-05-02 DIAGNOSIS — M96.1 CERVICAL POST-LAMINECTOMY SYNDROME: ICD-10-CM

## 2019-05-02 DIAGNOSIS — G60.8 IDIOPATHIC SMALL AND LARGE FIBER SENSORY NEUROPATHY: ICD-10-CM

## 2019-05-02 DIAGNOSIS — E11.42 DIABETIC PERIPHERAL NEUROPATHY ASSOCIATED WITH TYPE 2 DIABETES MELLITUS (HCC): ICD-10-CM

## 2019-05-02 DIAGNOSIS — H91.13 PRESBYCUSIS OF BOTH EARS: ICD-10-CM

## 2019-05-02 RX ORDER — DULOXETIN HYDROCHLORIDE 30 MG/1
30 CAPSULE, DELAYED RELEASE ORAL DAILY
Qty: 90 CAP | Refills: 5 | Status: SHIPPED | OUTPATIENT
Start: 2019-05-02 | End: 2020-05-18

## 2019-05-02 NOTE — LETTER
5/2/2019 1:03 PM 
 
Patient:  Agustin Carter YOB: 1935 Date of Visit: 5/2/2019 Dear No Recipients: Thank you for referring Mr. Agustin Carter to me for evaluation/treatment. Below are the relevant portions of my assessment and plan of care. Consult Subjective:  
 
Agustin Carter is a 80 y.o. Right-handed  male seen for evaluation at the request of Dr. Aubrey Obrien, of a new problem of having more headaches all the month of April that came on suddenly, described as a bad pressure pain and stabbing pain at the base of his skull and craniocervical junction that then radiated up into the temporal regions and sometimes even into the jaw region on the left side, with some facial numbness. He has seen his pain management doctor Dr. Don Muller, and they are going to do several injections in his neck to help with the pain and they did x-rays in January that shows he does have moderate arthritis at multiple levels. We offered him physical therapy but he said he is used all his physical therapy, and no longer gets it covered, we will give him exercises to do on his after visit summary. We explained cervicogenic headaches from tight muscles and cervical arthritis, and occipital neuralgia is most likely the combination of pain that he had. Is gotten better in the last 2 days he looks much more comfortable. He denies any recent fever, meningismus, or trauma to the head or neck, and no new focal weakness or sensory loss in his arms and legs. He is also seen for his diabetic neuropathy, and find that the Cymbalta seemed to help, and is only on the 20 mg dose, and would like to increase his dose or so we will give him a 30 mg because he is leery of the 60 mg. He has had no recent visual changes or any other focal neurologic deficit, no fever no meningismus, no recent trauma, and no other cause for his symptoms.   He had complaints of generalized fatigue and weakness because he can exercise because of his chronic back pain and neck pain and arthritis, but all his work-up for any treatable neuromuscular disease were negative including myasthenia titers, thyroid, B12, and his CPK was normal, and all his connective tissue screen of SHAYNE and sed rate were normal.  He does have diabetic neuropathy in his feet, and doing a little better on the Cymbalta and Neurontin 600 mg 3 times a day. He has seen back surgeon and pain management physicians and then not able to do too much for his back and is markedly limited by that. He has a new AFO brace on his right leg for his old chronic L5 radiculopathy, that seems to be doing a little bit better. He walks very slowly with a marked limp. The last hemoglobin A1c I see was 6.3 done 2 years ago. 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 He takes 1-1/2 50 mg trazodone at nighttime. Patient's EMG study was done in December 2016 and also showed a chronic L5 radiculopathy on the right and left side and the right borderline carpal tunnel syndrome on the right side. He has a post lumbar laminectomy syndrome with chronic pain. We suggested he see Dr. Mattie Acevedo continue his pain management, or could refer him to [facility] of [facility] for pain management if he is unhappy. Patient was initially seen 12 months ago for evaluation of sudden loss of vision with a left peripheral visual field cut, that resolved spontaneously on its own and MRI scans did not show any clear stroke and he had no intracranial or extracranial significant vascular disease. He is stable on 81 mg of aspirin every day and not had a recurrent stroke like symptoms.  His carotid Doppler study showed no significant stenosis last visit, but he has had no recurrent Dopplers in 2 years to we will repeat that next week. . No other retinal cause for his visual loss was found. He has no major cognitive issues, but does have some mild recent memory loss at times, but does not really feel it needs an evaluation, and his wife initially seemed concerned but then says she doesn't think he needs an evaluation either. Patient has no known history of previous stroke, no significant headache except a mild chronic generalized headache ever since the fall, no prior history of visual loss, and no other new focal weakness or sensory loss except for the numbness in his feet. Patient has no jaw claudication or other PMR symptoms. Past Medical History:  
Diagnosis Date  Arthritis   
 osteoarthritis  Cancer ([facility] [address]   
 prostate, basal cell CA nose, bladder CA  Diabetes ([facility] [address] borderline  GERD (gastroesophageal reflux disease)  Hypertension  Insomnia  Selenium deficiency Past Surgical History:  
Procedure Laterality Date  HX CARPAL TUNNEL RELEASE Left 2012  HX HEENT Right 2009  
 cataract  HX ORTHOPAEDIC Right 2009 Rt rotator cuff  HX ORTHOPAEDIC    
 cervical spine spacers placed  HX ORTHOPAEDIC  [id number]  
 lumbar (back surgery)  HX ORTHOPAEDIC Left   
 wrist  
 HX OTHER SURGICAL  10/2011  
 basal cell CA nose  HX PROSTATECTOMY  1998  
 due to [address] [place] [address] Right 2009  HX UROLOGICAL    
 skin ca inside of my bladder,  
 
Family History Problem Relation Age of Onset Butt Other Mother   
     old age  Other Father   
     typhoid fever  Hypertension Brother  Asthma Brother  Cancer Brother   
     esophageal  
 Diabetes Brother  Arthritis-osteo Brother Social History Tobacco Use  Smoking status: Never Smoker  Smokeless tobacco: Former User  Tobacco comment: chewing tobacco quit years ago Substance Use Topics  Alcohol use: Yes   Alcohol/week: 1.2 oz  
 Types: 2 Glasses of wine per week Comment: occasionally Current Outpatient Medications:  
  DULoxetine (CYMBALTA) 30 mg capsule, Take 1 Cap by mouth daily. , Disp: 90 Cap, Rfl: 5 
  gabapentin (NEURONTIN) 600 mg tablet, TAKE ONE TABLET BY MOUTH THREE TIMES A DAY, Disp: 162 Tab, Rfl: 0 
  potassium 99 mg tablet, Take 99 mg by mouth daily. , Disp: , Rfl:  
  magnesium 250 mg tab, Take  by mouth., Disp: , Rfl:  
  ferrous fumarate/vit Bcomp,C (SUPER B COMPLEX PO), Take 1 Tab by mouth daily. , Disp: , Rfl:  
  ergocalciferol, vitamin D2, (VITAMIN D2 PO), Take 1 Tab by mouth. Pt does not know dose, Disp: , Rfl:  
  RESTASIS 0.05 % ophthalmic emulsion, , Disp: , Rfl:  
  SILDENAFIL CITRATE (VIAGRA PO), Take  by mouth., Disp: , Rfl:  
  celecoxib (CELEBREX) 200 mg capsule, 200 mg daily. , Disp: , Rfl:  
  lisinopril (PRINIVIL, ZESTRIL) 10 mg tablet, 10 mg daily. , Disp: , Rfl:  
  traZODone (DESYREL) 50 mg tablet, Takes one and 1/2 tab, Disp: , Rfl:  
  copper gluconate 2 mg tab tablet, Take  by mouth., Disp: , Rfl:  
  FOLIC ACID PO, Take  by mouth., Disp: , Rfl:  
  SALMON OIL/OMEGA-3 FATTY ACIDS (SALMON OIL-1000 PO), Take  by mouth., Disp: , Rfl:  
  SACCHAROMYCES BOULARDII (PROBIOTIC, S.BOULARDII, PO), Take  by mouth., Disp: , Rfl:  
  omeprazole (PRILOSEC) 40 mg capsule, Take 40 mg by mouth daily. , Disp: , Rfl:  
  pyridoxine (VITAMIN B-6) 200 mg tablet, Take 400 mg by mouth daily. , Disp: , Rfl:  
  nicotinic acid (NIACIN) 500 mg tablet, Take 500 mg by mouth Daily (before breakfast). , Disp: , Rfl:  
  GLUCOSAMINE HCL/CHONDR WELDON A NA (GLUCOSAMINE-CHONDROITIN) 750-600 mg Tab, Take 1,500 mg by mouth two (2) times a day.  , Disp: , Rfl:  
  multivitamin (ONE A DAY) tablet, Take 1 Tab by mouth daily. , Disp: , Rfl:  
 
 
 
Allergies Allergen Reactions  Sulfa (Sulfonamide Antibiotics) Hives Review of Systems: A comprehensive review of systems was negative except for: Eyes: positive for visual disturbance Musculoskeletal: positive for myalgias, arthralgias, stiff joints and back pain Neurological: positive for headaches, memory problems, paresthesia and gait problems Vitals:  
 05/02/19 1114 BP: 112/52 Pulse: 83 Resp: 16 SpO2: 96% Weight: 155 lb (70.3 kg) Height: 5' 7\" (1.702 m) Objective: I 
 
 
NEUROLOGICAL EXAM: 
 
Appearance: The patient is well developed, well nourished, provides a coherent history and is in no acute distress. Mental Status: Oriented to time, place and person, and the president, cognitive function is a bit slow but probably normal and speech is fluent and no aphasia or dysarthria. Mood and affect appropriate. Cranial Nerves:   Intact visual fields. Fundi are benign, discs are flat. MOISES, EOM's full, no nystagmus, no ptosis. Facial sensation is normal. Corneal reflexes are not tested. Facial movement is symmetric. Hearing is abnormal bilaterally. Palate is midline with normal sternocleidomastoid and trapezius muscles are normal. Tongue is midline. Neck without meningismus or bruits Neck has limited range of motion in all directions, and the muscles are very tight at the craniocervical junction Patient has no temporal artery tenderness or enlargement Motor:  5/5 strength in upper and lower proximal and distal muscles except for a foot drop on the right side from his previous back problems, and foot drop on the right. Normal bulk and tone. No fasciculations. Patient has very stiff lower lumbar spine with muscle spasms present and decreased range of motion Rapid altering movement is a little slow but symmetric Reflexes:   Deep tendon reflexes 1+/4 and symmetrical, absent ankle jerks bilaterally. No babinski or clonus present Sensory:   Abnormal to touch, pinprick and vibration and temperature in both feet to midcalf level. DSS is intact Gait:  Abnormal gait because of his back pain he moves slowly. Tremor:   No tremor noted. Cerebellar:  Mildly abnormal Romberg and tandem cerebellar signs present, but finger-nose-finger examination shows no major tremor. Manisha Chatman Neurovascular:  Normal heart sounds and regular rhythm, peripheral pulses decreased, and no carotid bruits. Assessment: ICD-10-CM ICD-9-CM 1. Bilateral carotid artery stenosis I65.23 433.10   
  433.30 2. 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 Diabetic peripheral neuropathy associated with type 2 diabetes mellitus (HCC) E11.42 250.60   
  357.2 4. Lumbar back pain with radiculopathy affecting left lower extremity M54.16 724.4 5. 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 Presbycusis of both ears H91.13 388.01   
7. Memory change R41.3 780.93   
8. Left homonymous hemianopsia H53.462 368.46   
9. Lumbar post-laminectomy syndrome M96.1 722.83   
10. Cervical post-laminectomy syndrome M96.1 722.81 Plan:  
 
Patient with new problem of increasing headaches in the craniocervical junction area, most consistent with cervicogenic headaches and occipital neuralgia, but has gotten better in the last 2 days, so we will continue a gentle neck exercise program, patient does not want physical therapy, needs to continue therapy with his pain management physician to get his injections in his neck from Dr. Sherryle Chamber, will decide on further treatment evaluation pending results of the clinical course. We will try him back on Cymbalta 30 mg a day and he will continue his Neurontin 600 mg 2-3 times a day as tolerated and watch for sedation. He says he cannot increase the dose because he is too fatigued and tired Patient also off sleeping pills, and is sleeping better just on the trazodone after we advised him of the increased risk of death with sleeping pills He also has a chronic bilateral L5 radiculopathy with dorsiflexor weakness right side side greater with new AFO brace which helps him walk on the right He is also to continue multivitamins and vitamin D on a regular basis remained between physically active Patient had a complicated history of some visual loss on the left visual field after head injury, that seems better already, an MRI scan shows no clear stroke as a cause Carotid Dopplers showed no significant stenosis at last visit 9 months ago Sedimentation rate to rule out temporal arteritis was normal 
He is encouraged to take a baby aspirin every day, multivitamin every day, and vitamin D every day. He has remained mentally and physically active 35 minutes spent with the patient, reviewing his records on the computer, reviewing his labs, reviewing his EMG and labs computer system today, and deciding he most likely has a diabetic neuropathy. He may have some mild cognitive impairment but we will defer workup for that at this time as requested by the patient Followup in 6 months, earlier if needed and they will call of any problem in the interim Signed By: Rosemary Bailey MD   
 May 2, 2019 This note will not be viewable in [address] If you have questions, please do not hesitate to call me. I look forward to following Mr. Ronnie Nascimento along with you. Sincerely, Rosemary Bailey MD

## 2019-05-02 NOTE — PATIENT INSTRUCTIONS
Neck Spasm: Exercises Your Care Instructions Here are some examples of typical rehabilitation exercises for your condition. Start each exercise slowly. Ease off the exercise if you start to have pain. Your doctor or physical therapist will tell you when you can start these exercises and which ones will work best for you. How to do the exercises Levator scapula stretch 1. Sit in a firm chair, or stand up straight. 2. Gently tilt your head toward your left shoulder. 3. Turn your head to look down into your armpit, bending your head slightly forward. Let the weight of your head stretch your neck muscles. 4. Hold for 15 to 30 seconds. 5. Return to your starting position. 6. Follow the same instructions above, but tilt your head toward your right shoulder. 7. Repeat 2 to 4 times toward each shoulder. Upper trapezius stretch 1. Sit in a firm chair, or stand up straight. 2. This stretch works best if you keep your shoulder down as you lean away from it. To help you remember to do this, start by relaxing your shoulders and lightly holding on to your thighs or your chair. 3. Tilt your head toward your shoulder and hold for 15 to 30 seconds. Let the weight of your head stretch your muscles. 4. If you would like a little added stretch, place your arm behind your back. Use the arm opposite of the direction you are tilting your head. For example, if you are tilting your head to the left, place your right arm behind your back. 5. Repeat 2 to 4 times toward each shoulder. Neck rotation 1. Sit in a firm chair, or stand up straight. 2. Keeping your chin level, turn your head to the right, and hold for 15 to 30 seconds. 3. Turn your head to the left, and hold for 15 to 30 seconds. 4. Repeat 2 to 4 times to each side. Chin tuck 1. Lie on the floor with a rolled-up towel under your neck. Your head should be touching the floor. 2. Slowly bring your chin toward the front of your neck. 3. Hold for a count of 6, and then relax for up to 10 seconds. 4. Repeat 8 to 12 times. Forward neck flexion 1. Sit in a firm chair, or stand up straight. 2. Bend your head forward. 3. Hold for 15 to 30 seconds, then return to your starting position. 4. Repeat 2 to 4 times. Follow-up care is a key part of your treatment and safety. Be sure to make and go to all appointments, and call your doctor if you are having problems. It's also a good idea to know your test results and keep a list of the medicines you take. Where can you learn more? Go to http://marilynn-nils.info/. Enter P962 in the search box to learn more about \"Neck Spasm: Exercises. \" Current as of: September 20, 2018 Content Version: 11.9 © 4770-8977 [email or web address]. Care instructions adapted under license by [employer] (which disclaims liability or warranty for this information). If you have questions about a medical condition or this instruction, always ask your healthcare professional. [place] [address] any warranty or liability for your use of this information. Neck Strain or Sprain: Rehab Exercises Your Care Instructions Here are some examples of typical rehabilitation exercises for your condition. Start each exercise slowly. Ease off the exercise if you start to have pain. Your doctor or physical therapist will tell you when you can start these exercises and which ones will work best for you. How to do the exercises Neck rotation 1. Sit in a firm chair, or stand up straight. 2. Keeping your chin level, turn your head to the right, and hold for 15 to 30 seconds. 3. Turn your head to the left and hold for 15 to 30 seconds. 4. Repeat 2 to 4 times to each side. Neck stretches 1. Look straight ahead, and tip your right ear to your right shoulder. Do not let your left shoulder rise up as you tip your head to the right. 2. Hold for 15 to 30 seconds. 3. Tilt your head to the left. Do not let your right shoulder rise up as you tip your head to the left. 4. Hold for 15 to 30 seconds. 5. Repeat 2 to 4 times to each side. Forward neck flexion 1. Sit in a firm chair, or stand up straight. 2. Bend your head forward. 3. Hold for 15 to 30 seconds. 4. Repeat 2 to 4 times. Lateral (side) bend strengthening 1. With your right hand, place your first two fingers on your right temple. 2. Start to bend your head to the side while using gentle pressure from your fingers to keep your head from bending. 3. Hold for about 6 seconds. 4. Repeat 8 to 12 times. 5. Switch hands and repeat the same exercise on your left side. Forward bend strengthening 1. Place your first two fingers of either hand on your forehead. 2. Start to bend your head forward while using gentle pressure from your fingers to keep your head from bending. 3. Hold for about 6 seconds. 4. Repeat 8 to 12 times. Neutral position strengthening 1. Using one hand, place your fingertips on the back of your head at the top of your neck. 2. Start to bend your head backward while using gentle pressure from your fingers to keep your head from bending. 3. Hold for about 6 seconds. 4. Repeat 8 to 12 times. Chin tuck 1. Lie on the floor with a rolled-up towel under your neck. Your head should be touching the floor. 2. Slowly bring your chin toward your chest. 
3. Hold for a count of 6, and then relax for up to 10 seconds. 4. Repeat 8 to 12 times. Follow-up care is a key part of your treatment and safety. Be sure to make and go to all appointments, and call your doctor if you are having problems. It's also a good idea to know your test results and keep a list of the medicines you take. Where can you learn more? Go to http://marilynn-nils.info/. Enter M679 in the search box to learn more about \"Neck Strain or Sprain: Rehab Exercises. \" 
 Current as of: September 20, 2018 Content Version: 11.9 © 2302-4510 [employer]. Care instructions adapted under license by [employer] (which disclaims liability or warranty for this information). If you have questions about a medical condition or this instruction, always ask your healthcare professional. [address] any warranty or liability for your use of this information. Neck: Exercises Your Care Instructions Here are some examples of typical rehabilitation exercises for your condition. Start each exercise slowly. Ease off the exercise if you start to have pain. Your doctor or physical therapist will tell you when you can start these exercises and which ones will work best for you. How to do the exercises Neck stretch 1. This stretch works best if you keep your shoulder down as you lean away from it. To help you remember to do this, start by relaxing your shoulders and lightly holding on to your thighs or your chair. 2. Tilt your head toward your shoulder and hold for 15 to 30 seconds. Let the weight of your head stretch your muscles. 3. If you would like a little added stretch, use your hand to gently and steadily pull your head toward your shoulder. For example, keeping your right shoulder down, lean your head to the left. 4. Repeat 2 to 4 times toward each shoulder. Diagonal neck stretch 1. Turn your head slightly toward the direction you will be stretching, and tilt your head diagonally toward your chest and hold for 15 to 30 seconds. 2. If you would like a little added stretch, use your hand to gently and steadily pull your head forward on the diagonal. 
3. Repeat 2 to 4 times toward each side. Dorsal glide stretch 1. Sit or stand tall and look straight ahead. 2. Slowly tuck your chin as you glide your head backward over your body 3. Hold for a count of 6, and then relax for up to 10 seconds. 4. Repeat 8 to 12 times. Chest and shoulder stretch 1. Sit or stand tall and glide your head backward as in the dorsal glide stretch. 2. Raise both arms so that your hands are next to your ears. 3. Take a deep breath, and as you breathe out, lower your elbows down and behind your back. You will feel your shoulder blades slide down and together, and at the same time you will feel a stretch across your chest and the front of your shoulders. 4. Hold for about 6 seconds, and then relax for up to 10 seconds. 5. Repeat 8 to 12 times. Strengthening: Hands on head 1. Move your head backward, forward, and side to side against gentle pressure from your hands, holding each position for about 6 seconds. 2. Repeat 8 to 12 times. Follow-up care is a key part of your treatment and safety. Be sure to make and go to all appointments, and call your doctor if you are having problems. It's also a good idea to know your test results and keep a list of the medicines you take. Where can you learn more? Go to http://marilynn-nils.info/. Enter P975 in the search box to learn more about \"Neck: Exercises. \" Current as of: September 20, 2018 Content Version: 11.9 © 8712-0771 [employer]. Care instructions adapted under license by [employer] (which disclaims liability or warranty for this information). If you have questions about a medical condition or this instruction, always ask your healthcare professional. [place] [address] any warranty or liability for your use of this information. Neck Arthritis: Exercises Your Care Instructions Here are some examples of typical rehabilitation exercises for your condition. Start each exercise slowly. Ease off the exercise if you start to have pain. Your doctor or physical therapist will tell you when you can start these exercises and which ones will work best for you. How to do the exercises Neck stretches to the side 5. This stretch works best if you keep your shoulder down as you lean away from it. To help you remember to do this, start by relaxing your shoulders and lightly holding on to your thighs or your chair. 6. Tilt your head toward your shoulder and hold for 15 to 30 seconds. Let the weight of your head stretch your muscles. 7. Repeat 2 to 4 times toward each shoulder. Chin tuck 4. Lie on the floor with a rolled-up towel under your neck. Your head should be touching the floor. 5. Slowly bring your chin toward your chest. 
6. Hold for a count of 6, and then relax for up to 10 seconds. 7. Repeat 8 to 12 times. Active cervical rotation 5. Sit in a firm chair, or stand up straight. 6. Keeping your chin level, turn your head to the right, and hold for 15 to 30 seconds. 7. Turn your head to the left and hold for 15 to 30 seconds. 8. Repeat 2 to 4 times to each side. Shoulder blade squeeze 6. While standing, squeeze your shoulder blades together. 7. Do not raise your shoulders up as you are squeezing. 8. Hold for 6 seconds. 9. Repeat 8 to 12 times. Shoulder rolls 3. Sit comfortably with your feet shoulder-width apart. You can also do this exercise standing up. 4. Roll your shoulders up, then back, and then down in a smooth, circular motion. 5. Repeat 2 to 4 times. Follow-up care is a key part of your treatment and safety. Be sure to make and go to all appointments, and call your doctor if you are having problems. It's also a good idea to know your test results and keep a list of the medicines you take. Where can you learn more? Go to http://marilynn-nils.info/. Enter M183 in the search box to learn more about \"Neck Arthritis: Exercises. \" Current as of: September 20, 2018 Content Version: 11.9 © 3326-2499 [employer].  Care instructions adapted under license by [employer] (which disclaims liability or warranty for this information). If you have questions about a medical condition or this instruction, always ask your healthcare professional. [place] [address] any warranty or liability for your use of this information.

## 2019-05-02 NOTE — PROGRESS NOTES
Consult Subjective:  
 
Nathanael Cueva is a 80 y.o. Right-handed  male seen for evaluation at the request of Dr. Jodi Garcia, of a new problem of having more headaches all the month of April that came on suddenly, described as a bad pressure pain and stabbing pain at the base of his skull and craniocervical junction that then radiated up into the temporal regions and sometimes even into the jaw region on the left side, with some facial numbness. He has seen his pain management doctor Dr. Baljit Sanchez, and they are going to do several injections in his neck to help with the pain and they did x-rays in January that shows he does have moderate arthritis at multiple levels. We offered him physical therapy but he said he is used all his physical therapy, and no longer gets it covered, we will give him exercises to do on his after visit summary. We explained cervicogenic headaches from tight muscles and cervical arthritis, and occipital neuralgia is most likely the combination of pain that he had. Is gotten better in the last 2 days he looks much more comfortable. He denies any recent fever, meningismus, or trauma to the head or neck, and no new focal weakness or sensory loss in his arms and legs. He is also seen for his diabetic neuropathy, and find that the Cymbalta seemed to help, and is only on the 20 mg dose, and would like to increase his dose or so we will give him a 30 mg because he is leery of the 60 mg. He has had no recent visual changes or any other focal neurologic deficit, no fever no meningismus, no recent trauma, and no other cause for his symptoms.   He had complaints of generalized fatigue and weakness because he can exercise because of his chronic back pain and neck pain and arthritis, but all his work-up for any treatable neuromuscular disease were negative including myasthenia titers, thyroid, B12, and his CPK was normal, and all his connective tissue screen of SHAYNE and sed rate were normal.  He does have diabetic neuropathy in his feet, and doing a little better on the Cymbalta and Neurontin 600 mg 3 times a day. He has seen back surgeon and pain management physicians and then not able to do too much for his back and is markedly limited by that. He has a new AFO brace on his right leg for his old chronic L5 radiculopathy, that seems to be doing a little bit better. He walks very slowly with a marked limp. The last hemoglobin A1c I see was 6.3 done 2 years ago. 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 He takes 1-1/2 50 mg trazodone at nighttime. Patient's EMG study was done in December 2016 and also showed a chronic L5 radiculopathy on the right and left side and the right borderline carpal tunnel syndrome on the right side. He has a post lumbar laminectomy syndrome with chronic pain. We suggested he see Dr. Marlo Hall continue his pain management, or could refer him to [facility] [place] for pain management if he is unhappy. Patient was initially seen 12 months ago for evaluation of sudden loss of vision with a left peripheral visual field cut, that resolved spontaneously on its own and MRI scans did not show any clear stroke and he had no intracranial or extracranial significant vascular disease. He is stable on 81 mg of aspirin every day and not had a recurrent stroke like symptoms. His carotid Doppler study showed no significant stenosis last visit, but he has had no recurrent Dopplers in 2 years to we will repeat that next week. . No other retinal cause for his visual loss was found.  He has no major cognitive issues, but does have some mild recent memory loss at times, but does not really feel it needs an evaluation, and his wife initially seemed concerned but then says she doesn't think he needs an evaluation either. Patient has no known history of previous stroke, no significant headache except a mild chronic generalized headache ever since the fall, no prior history of visual loss, and no other new focal weakness or sensory loss except for the numbness in his feet. Patient has no jaw claudication or other PMR symptoms. Past Medical History:  
Diagnosis Date  Arthritis   
 osteoarthritis  Cancer ([facility] [address]   
 prostate, basal cell CA nose, bladder CA  Diabetes ([facility] [address] borderline  GERD (gastroesophageal reflux disease)  Hypertension  Insomnia  Selenium deficiency Past Surgical History:  
Procedure Laterality Date  HX CARPAL TUNNEL RELEASE Left 2012  HX HEENT Right 2009  
 cataract  HX ORTHOPAEDIC Right 2009 Rt rotator cuff  HX ORTHOPAEDIC    
 cervical spine spacers placed  HX ORTHOPAEDIC  [id number]  
 lumbar (back surgery)  HX ORTHOPAEDIC Left   
 wrist  
 HX OTHER SURGICAL  10/2011  
 basal cell CA nose  HX PROSTATECTOMY  1998  
 due to [facility] Right 2009  HX UROLOGICAL    
 skin ca inside of my bladder,  
 
Family History Problem Relation Age of Onset [place] Other Mother   
     old age  Other Father   
     typhoid fever  Hypertension Brother  Asthma Brother  Cancer Brother   
     esophageal  
 Diabetes Brother  Arthritis-osteo Brother Social History Tobacco Use  Smoking status: Never Smoker  Smokeless tobacco: Former User  Tobacco comment: chewing tobacco quit years ago Substance Use Topics  Alcohol use: Yes Alcohol/week: 1.2 oz Types: 2 Glasses of wine per week Comment: occasionally Current Outpatient Medications:  
  DULoxetine (CYMBALTA) 30 mg capsule, Take 1 Cap by mouth daily. , Disp: 90 Cap, Rfl: 5 
  gabapentin (NEURONTIN) 600 mg tablet, TAKE ONE TABLET BY MOUTH THREE TIMES A DAY, Disp: 162 Tab, Rfl: 0 
   potassium 99 mg tablet, Take 99 mg by mouth daily. , Disp: , Rfl:  
  magnesium 250 mg tab, Take  by mouth., Disp: , Rfl:  
  ferrous fumarate/vit Bcomp,C (SUPER B COMPLEX PO), Take 1 Tab by mouth daily. , Disp: , Rfl:  
  ergocalciferol, vitamin D2, (VITAMIN D2 PO), Take 1 Tab by mouth. Pt does not know dose, Disp: , Rfl:  
  RESTASIS 0.05 % ophthalmic emulsion, , Disp: , Rfl:  
  SILDENAFIL CITRATE (VIAGRA PO), Take  by mouth., Disp: , Rfl:  
  celecoxib (CELEBREX) 200 mg capsule, 200 mg daily. , Disp: , Rfl:  
  lisinopril (PRINIVIL, ZESTRIL) 10 mg tablet, 10 mg daily. , Disp: , Rfl:  
  traZODone (DESYREL) 50 mg tablet, Takes one and 1/2 tab, Disp: , Rfl:  
  copper gluconate 2 mg tab tablet, Take  by mouth., Disp: , Rfl:  
  FOLIC ACID PO, Take  by mouth., Disp: , Rfl:  
  SALMON OIL/OMEGA-3 FATTY ACIDS (SALMON OIL-1000 PO), Take  by mouth., Disp: , Rfl:  
  SACCHAROMYCES BOULARDII (PROBIOTIC, S.BOULARDII, PO), Take  by mouth., Disp: , Rfl:  
  omeprazole (PRILOSEC) 40 mg capsule, Take 40 mg by mouth daily. , Disp: , Rfl:  
  pyridoxine (VITAMIN B-6) 200 mg tablet, Take 400 mg by mouth daily. , Disp: , Rfl:  
  nicotinic acid (NIACIN) 500 mg tablet, Take 500 mg by mouth Daily (before breakfast). , Disp: , Rfl:  
  GLUCOSAMINE HCL/CHONDR WELDON A NA (GLUCOSAMINE-CHONDROITIN) 750-600 mg Tab, Take 1,500 mg by mouth two (2) times a day.  , Disp: , Rfl:  
  multivitamin (ONE A DAY) tablet, Take 1 Tab by mouth daily. , Disp: , Rfl:  
 
 
 
Allergies Allergen Reactions  Sulfa (Sulfonamide Antibiotics) Hives Review of Systems: A comprehensive review of systems was negative except for: Eyes: positive for visual disturbance Musculoskeletal: positive for myalgias, arthralgias, stiff joints and back pain Neurological: positive for headaches, memory problems, paresthesia and gait problems Vitals:  
 05/02/19 1114 BP: 112/52 Pulse: 83 Resp: 16 SpO2: 96% Weight: 155 lb (70.3 kg) Height: 5' 7\" (1.702 m) Objective: I 
 
 
NEUROLOGICAL EXAM: 
 
Appearance: The patient is well developed, well nourished, provides a coherent history and is in no acute distress. Mental Status: Oriented to time, place and person, and the president, cognitive function is a bit slow but probably normal and speech is fluent and no aphasia or dysarthria. Mood and affect appropriate. Cranial Nerves:   Intact visual fields. Fundi are benign, discs are flat. MOISES, EOM's full, no nystagmus, no ptosis. Facial sensation is normal. Corneal reflexes are not tested. Facial movement is symmetric. Hearing is abnormal bilaterally. Palate is midline with normal sternocleidomastoid and trapezius muscles are normal. Tongue is midline. Neck without meningismus or bruits Neck has limited range of motion in all directions, and the muscles are very tight at the craniocervical junction Patient has no temporal artery tenderness or enlargement Motor:  5/5 strength in upper and lower proximal and distal muscles except for a foot drop on the right side from his previous back problems, and foot drop on the right. Normal bulk and tone. No fasciculations. Patient has very stiff lower lumbar spine with muscle spasms present and decreased range of motion Rapid altering movement is a little slow but symmetric Reflexes:   Deep tendon reflexes 1+/4 and symmetrical, absent ankle jerks bilaterally. No babinski or clonus present Sensory:   Abnormal to touch, pinprick and vibration and temperature in both feet to midcalf level. DSS is intact Gait:  Abnormal gait because of his back pain he moves slowly. Tremor:   No tremor noted. Cerebellar:  Mildly abnormal Romberg and tandem cerebellar signs present, but finger-nose-finger examination shows no major tremor. Magdalenasusy Russell Neurovascular:  Normal heart sounds and regular rhythm, peripheral pulses decreased, and no carotid bruits. Assessment: ICD-10-CM ICD-9-CM 1. Bilateral carotid artery stenosis I65.23 433.10   
  433.30 2. 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 Diabetic peripheral neuropathy associated with type 2 diabetes mellitus (HCC) E11.42 250.60   
  357.2 4. Lumbar back pain with radiculopathy affecting left lower extremity M54.16 724.4 5. 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 Presbycusis of both ears H91.13 388.01   
7. Memory change R41.3 780.93   
8. Left homonymous hemianopsia H53.462 368.46   
9. Lumbar post-laminectomy syndrome M96.1 722.83   
10. Cervical post-laminectomy syndrome M96.1 722.81 Plan:  
 
Patient with new problem of increasing headaches in the craniocervical junction area, most consistent with cervicogenic headaches and occipital neuralgia, but has gotten better in the last 2 days, so we will continue a gentle neck exercise program, patient does not want physical therapy, needs to continue therapy with his pain management physician to get his injections in his neck from Dr. Lewis Rich, will decide on further treatment evaluation pending results of the clinical course. We will try him back on Cymbalta 30 mg a day and he will continue his Neurontin 600 mg 2-3 times a day as tolerated and watch for sedation. He says he cannot increase the dose because he is too fatigued and tired Patient also off sleeping pills, and is sleeping better just on the trazodone after we advised him of the increased risk of death with sleeping pills He also has a chronic bilateral L5 radiculopathy with dorsiflexor weakness right side side greater with new AFO brace which helps him walk on the right He is also to continue multivitamins and vitamin D on a regular basis remained between physically active Patient had a complicated history of some visual loss on the left visual field after head injury, that seems better already, an MRI scan shows no clear stroke as a cause Carotid Dopplers showed no significant stenosis at last visit 9 months ago Sedimentation rate to rule out temporal arteritis was normal 
He is encouraged to take a baby aspirin every day, multivitamin every day, and vitamin D every day. He has remained mentally and physically active 35 minutes spent with the patient, reviewing his records on the computer, reviewing his labs, reviewing his EMG and labs computer system today, and deciding he most likely has a diabetic neuropathy. He may have some mild cognitive impairment but we will defer workup for that at this time as requested by the patient Followup in 6 months, earlier if needed and they will call of any problem in the interim Signed By: Kojo Gutierres MD   
 May 2, 2019 This note will not be viewable in [address]

## 2019-07-12 ENCOUNTER — TELEPHONE (OUTPATIENT)
Dept: NEUROLOGY | Age: 84
End: 2019-07-12

## 2019-07-12 DIAGNOSIS — R41.3 MEMORY CHANGE: ICD-10-CM

## 2019-07-12 DIAGNOSIS — I63.231 CEREBRAL INFARCTION INVOLVING RIGHT CAROTID ARTERY (HCC): ICD-10-CM

## 2019-07-12 DIAGNOSIS — M96.1 LUMBAR POST-LAMINECTOMY SYNDROME: ICD-10-CM

## 2019-07-12 DIAGNOSIS — M96.1 CERVICAL POST-LAMINECTOMY SYNDROME: ICD-10-CM

## 2019-07-12 DIAGNOSIS — G60.8 IDIOPATHIC SMALL AND LARGE FIBER SENSORY NEUROPATHY: ICD-10-CM

## 2019-07-12 DIAGNOSIS — E11.42 DIABETIC PERIPHERAL NEUROPATHY ASSOCIATED WITH TYPE 2 DIABETES MELLITUS (HCC): ICD-10-CM

## 2019-07-12 DIAGNOSIS — M54.16 LUMBAR BACK PAIN WITH RADICULOPATHY AFFECTING LEFT LOWER EXTREMITY: ICD-10-CM

## 2019-07-12 RX ORDER — GABAPENTIN 600 MG/1
TABLET ORAL
Qty: 162 TAB | Refills: 0 | Status: SHIPPED | OUTPATIENT
Start: 2019-07-12 | End: 2019-11-29 | Stop reason: SDUPTHER

## 2019-07-12 NOTE — TELEPHONE ENCOUNTER
----- Message from Catrina Crowder sent at 7/12/2019 12:18 PM EDT -----  Regarding: Dr. Hans Leyva  Medication Refill    Caller (if not patient):Trev Feliz      Relationship of caller (if not patient):      Best contact number(s):[phone] please call back with an update      Name of medication and dosage if known:\"Gabepentin 600 mg      Is patient out of this medication (yes/no):yes      Pharmacy name:[facility] in [address] [place] [address] listed in chart? (yes/no):yes  Pharmacy phone number:      Details to clarify the request:Pt stated he called 3 times today regarding a refill on his Rx(\"Gabepentin\" 600 mg) and would like to know if the Rx has been called in. Pt stated he is at the pharmacy and is out of medication.       Catrina Crowder

## 2019-08-29 ENCOUNTER — OFFICE VISIT (OUTPATIENT)
Dept: NEUROLOGY | Age: 84
End: 2019-08-29

## 2019-08-29 VITALS
BODY MASS INDEX: 24.33 KG/M2 | SYSTOLIC BLOOD PRESSURE: 120 MMHG | OXYGEN SATURATION: 96 % | HEART RATE: 80 BPM | WEIGHT: 155 LBS | DIASTOLIC BLOOD PRESSURE: 66 MMHG | RESPIRATION RATE: 16 BRPM | HEIGHT: 67 IN

## 2019-08-29 DIAGNOSIS — R41.3 MEMORY CHANGE: ICD-10-CM

## 2019-08-29 DIAGNOSIS — E11.42 DIABETIC PERIPHERAL NEUROPATHY ASSOCIATED WITH TYPE 2 DIABETES MELLITUS (HCC): ICD-10-CM

## 2019-08-29 DIAGNOSIS — M96.1 LUMBAR POST-LAMINECTOMY SYNDROME: ICD-10-CM

## 2019-08-29 DIAGNOSIS — H53.462 LEFT HOMONYMOUS HEMIANOPSIA: ICD-10-CM

## 2019-08-29 DIAGNOSIS — G60.8 IDIOPATHIC SMALL AND LARGE FIBER SENSORY NEUROPATHY: ICD-10-CM

## 2019-08-29 DIAGNOSIS — I65.23 BILATERAL CAROTID ARTERY STENOSIS: ICD-10-CM

## 2019-08-29 DIAGNOSIS — M96.1 CERVICAL POST-LAMINECTOMY SYNDROME: ICD-10-CM

## 2019-08-29 DIAGNOSIS — M54.16 LUMBAR BACK PAIN WITH RADICULOPATHY AFFECTING LEFT LOWER EXTREMITY: ICD-10-CM

## 2019-08-29 DIAGNOSIS — H91.13 PRESBYCUSIS OF BOTH EARS: ICD-10-CM

## 2019-08-29 DIAGNOSIS — I67.89 CEREBRAL MICROVASCULAR DISEASE: Primary | ICD-10-CM

## 2019-08-29 NOTE — PROGRESS NOTES
Consult    Subjective:     Joselito Marion is a 80 y.o. Right-handed  male seen for evaluation at the request of Dr. Lloyd Bonilla, of problem of having more headaches all the month of April that came on suddenly, described as a bad pressure pain and stabbing pain at the base of his skull and craniocervical junction that then radiated up into the temporal regions and sometimes even into the jaw region on the left side, with some facial numbness, but they have gotten better and he is doing better with them after getting therapy with Dr. Sravani Isabel.  He also has chronic back pain from his postlaminectomy syndrome and spinal stenosis, but does not want back surgery. He has bilateral L5 motor radiculopathy right greater than left and wears an AFO brace on the right leg because of foot drop. Javier Thao He has seen his pain management doctor Dr. Sravani Isabel, and they did do several injections in his neck to help with the pain and they did x-rays in January that shows he does have moderate arthritis at multiple levels. We offered him physical therapy but he said he is used all his physical therapy, and no longer gets it covered, we will give him exercises to do on his after visit summary. We explained cervicogenic headaches from tight muscles and cervical arthritis, and occipital neuralgia is most likely the combination of pain that he had. Is gotten better in the last 2 days he looks much more comfortable. He denies any recent fever, meningismus, or trauma to the head or neck, and no new focal weakness or sensory loss in his arms and legs. He is also seen for his diabetic neuropathy, and find that the Cymbalta seemed to help,  30 mg. He has had no recent visual changes or any other focal neurologic deficit, no fever no meningismus, no recent trauma, and no other cause for his symptoms.   He had complaints of generalized fatigue and weakness because he can exercise because of his chronic back pain and neck pain and arthritis, but all his work-up for any treatable neuromuscular disease were negative including myasthenia titers, thyroid, B12, and his CPK was normal, and all his connective tissue screen of SHAYNE and sed rate were normal.  He does have diabetic neuropathy in his feet, and doing a little better on the Cymbalta and Neurontin 600 mg 3 times a day. He has seen back surgeon and pain management physicians and then not able to do too much for his back and is markedly limited by that. He has a new AFO brace on his right leg for his old chronic L5 radiculopathy, that seems to be doing a little bit better. He walks very slowly with a marked limp. The last hemoglobin A1c I see was 6.3 done 2 years ago. 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 He takes 1-1/2 50 mg trazodone at nighttime. Patient's EMG study was done in December 2016 and also showed a chronic L5 radiculopathy on the right and left side and the right borderline carpal tunnel syndrome on the right side. He has a post lumbar laminectomy syndrome with chronic pain. Patient was initially seen 12 months ago for evaluation of sudden loss of vision with a left peripheral visual field cut, that resolved spontaneously on its own and MRI scans did not show any clear stroke and he had no intracranial or extracranial significant vascular disease. He is stable on 81 mg of aspirin every day and not had a recurrent stroke like symptoms. His carotid Doppler study showed no significant stenosis last visit, but he has had no recurrent Dopplers in 2 years to we will repeat that next week. . No other retinal cause for his visual loss was found.  He has no major cognitive issues, but does have some mild recent memory loss at times, but does not really feel it needs an evaluation, and his wife initially seemed concerned but then says she doesn't think he needs an evaluation either. Patient has no known history of previous stroke, no significant headache except a mild chronic generalized headache ever since the fall, no prior history of visual loss, and no other new focal weakness or sensory loss except for the numbness in his feet. Patient has no jaw claudication or other PMR symptoms. Past Medical History:   Diagnosis Date    Arthritis     osteoarthritis    Cancer ([address]     prostate, basal cell CA nose, bladder CA    Diabetes (HCC)     borderline    GERD (gastroesophageal reflux disease)     Hypertension     Insomnia     Selenium deficiency       Past Surgical History:   Procedure Laterality Date    HX CARPAL TUNNEL RELEASE Left 2012    HX HEENT Right 2009    cataract    HX ORTHOPAEDIC Right 2009    Rt rotator cuff    HX ORTHOPAEDIC      cervical spine spacers placed    HX ORTHOPAEDIC  7-2012    lumbar (back surgery)    HX ORTHOPAEDIC Left     wrist    HX OTHER SURGICAL  10/2011    basal cell CA nose    HX PROSTATECTOMY  1998    due to CA    HX ROTATOR CUFF REPAIR Right 2009    HX UROLOGICAL      skin ca inside of my bladder,     Family History   Problem Relation Age of Onset    Other Mother         old age   Butt Other Father         typhoid fever    Hypertension Brother     Asthma Brother     Cancer Brother         esophageal    Diabetes Brother     Arthritis-osteo Brother       Social History     Tobacco Use    Smoking status: Never Smoker    Smokeless tobacco: Former User    Tobacco comment: chewing tobacco quit years ago   Substance Use Topics    Alcohol use:  Yes     Alcohol/week: 2.0 standard drinks     Types: 2 Glasses of wine per week     Comment: occasionally         Current Outpatient Medications:     lipase/protease/amylase (CREON PO), Take  by mouth., Disp: , Rfl:     gabapentin (NEURONTIN) 600 mg tablet, TAKE ONE TABLET BY MOUTH THREE TIMES A DAY, Disp: 162 Tab, Rfl: 0    DULoxetine (CYMBALTA) 30 mg capsule, Take 1 Cap by mouth daily. , Disp: 90 Cap, Rfl: 5    potassium 99 mg tablet, Take 99 mg by mouth daily. , Disp: , Rfl:     magnesium 250 mg tab, Take  by mouth., Disp: , Rfl:     ferrous fumarate/vit Bcomp,C (SUPER B COMPLEX PO), Take 1 Tab by mouth daily. , Disp: , Rfl:     ergocalciferol, vitamin D2, (VITAMIN D2 PO), Take 1 Tab by mouth. Pt does not know dose, Disp: , Rfl:     RESTASIS 0.05 % ophthalmic emulsion, , Disp: , Rfl:     SILDENAFIL CITRATE (VIAGRA PO), Take  by mouth., Disp: , Rfl:     celecoxib (CELEBREX) 200 mg capsule, 200 mg daily. , Disp: , Rfl:     lisinopril (PRINIVIL, ZESTRIL) 10 mg tablet, 10 mg daily. , Disp: , Rfl:     traZODone (DESYREL) 50 mg tablet, Takes one and 1/2 tab, Disp: , Rfl:     copper gluconate 2 mg tab tablet, Take  by mouth., Disp: , Rfl:     FOLIC ACID PO, Take  by mouth., Disp: , Rfl:     SALMON OIL/OMEGA-3 FATTY ACIDS (SALMON OIL-1000 PO), Take  by mouth., Disp: , Rfl:     SACCHAROMYCES BOULARDII (PROBIOTIC, S.BOULARDII, PO), Take  by mouth., Disp: , Rfl:     omeprazole (PRILOSEC) 40 mg capsule, Take 40 mg by mouth daily. , Disp: , Rfl:     pyridoxine (VITAMIN B-6) 200 mg tablet, Take 400 mg by mouth daily. , Disp: , Rfl:     nicotinic acid (NIACIN) 500 mg tablet, Take 500 mg by mouth Daily (before breakfast). , Disp: , Rfl:     GLUCOSAMINE HCL/CHONDR WELDON A NA (GLUCOSAMINE-CHONDROITIN) 750-600 mg Tab, Take 1,500 mg by mouth two (2) times a day.  , Disp: , Rfl:     multivitamin (ONE A DAY) tablet, Take 1 Tab by mouth daily.   , Disp: , Rfl:         Allergies   Allergen Reactions    Sulfa (Sulfonamide Antibiotics) Hives        Review of Systems:  A comprehensive review of systems was negative except for: Eyes: positive for visual disturbance  Musculoskeletal: positive for myalgias, arthralgias, stiff joints and back pain  Neurological: positive for headaches, memory problems, paresthesia and gait problems   Vitals:    08/29/19 1045   BP: 120/66   Pulse: 80   Resp: 16   SpO2: 96%   Weight: 155 lb (70.3 kg)   Height: 5' 7\" (1.702 m)     Objective:     I      NEUROLOGICAL EXAM:    Appearance: The patient is well developed, well nourished, provides a coherent history and is in no acute distress. Mental Status: Oriented to time, place and person, and the president, cognitive function is a bit slow but probably normal and speech is fluent and no aphasia or dysarthria. Mood and affect appropriate. Cranial Nerves:   Intact visual fields. Fundi are benign, discs are flat. MOISES, EOM's full, no nystagmus, no ptosis. Facial sensation is normal. Corneal reflexes are not tested. Facial movement is symmetric. Hearing is abnormal bilaterally. Palate is midline with normal sternocleidomastoid and trapezius muscles are normal. Tongue is midline. Neck without meningismus or bruits  Neck has limited range of motion in all directions, and the muscles are very tight at the craniocervical junction  Patient has no temporal artery tenderness or enlargement   Motor:  5/5 strength in upper and lower proximal and distal muscles except for a foot drop on the right side from his previous back problems, and foot drop on the right. Normal bulk and tone. No fasciculations. Patient has very stiff lower lumbar spine with muscle spasms present and decreased range of motion  Rapid altering movement is a little slow but symmetric   Reflexes:   Deep tendon reflexes 1+/4 and symmetrical, absent ankle jerks bilaterally. No babinski or clonus present   Sensory:   Abnormal to touch, pinprick and vibration and temperature in both feet to midcalf level. DSS is intact   Gait:  Abnormal gait because of his back pain he moves slowly. Tremor:   No tremor noted. Cerebellar:  Mildly abnormal Romberg and tandem cerebellar signs present, but finger-nose-finger examination shows no major tremor. .   Neurovascular:  Normal heart sounds and regular rhythm, peripheral pulses decreased, and no carotid bruits.            Assessment: ICD-10-CM ICD-9-CM    1. 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 Bilateral carotid artery stenosis I65.23 433.10      433.30    3. Diabetic peripheral neuropathy associated with type 2 diabetes mellitus (HCC) E11.42 250.60      357.2    4. 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 Lumbar back pain with radiculopathy affecting left lower extremity M54.16 724.4    6. Memory change R41.3 780.93    7. Left homonymous hemianopsia H53.462 368.46    8. Lumbar post-laminectomy syndrome M96.1 722.83    9. Cervical post-laminectomy syndrome M96.1 722.81    10. Presbycusis of both ears H91.13 388.01          Plan:     Patient doing better with headaches, and he stays stable with pain management with Dr. Rafal Hampton and his PCP is doing all his medications. We will see him again in a years time he will call us if any change in his condition. Patient also off sleeping pills, and is sleeping better just on the trazodone after we advised him of the increased risk of death with sleeping pills  He also has a chronic bilateral L5 radiculopathy with dorsiflexor weakness right side side greater with new AFO brace which helps him walk on the right  He is also to continue multivitamins and vitamin D on a regular basis remained between physically active  Patient had a complicated history of some visual loss on the left visual field after head injury, that seems better already, an MRI scan shows no clear stroke as a cause  Carotid Dopplers showed no significant stenosis at last visit 9 months ago  Sedimentation rate to rule out temporal arteritis was normal  He is encouraged to take a baby aspirin every day, multivitamin every day, and vitamin D every day. He has remained mentally and physically active  35 minutes spent with the patient, reviewing his records on the computer, reviewing his labs, reviewing his EMG and labs computer system today, and deciding he most likely has a diabetic neuropathy.     He may have some mild cognitive impairment but we will defer workup for that at this time as requested by the patient   Followup in 12 months, earlier if needed and they will call of any problem in the interim    Signed By: Lizzette Skaggs MD     August 29, 2019       This note will not be viewable in [address]

## 2019-10-08 NOTE — PERIOP NOTES
[facility]  Ambulatory Surgery Unit  Pre-operative Instructions    Procedure Date  10/15/19            Tentative Arrival Time 1300      1. On the day of your procedure, please report to the Ambulatory Surgery Unit Registration Desk and sign in at your designated time. The Ambulatory Surgery Unit is located in [facility] on the [facility] side of the [facility] across from the [address] [place] [address]. Please have all of your health insurance cards and a photo ID. 2. You must have someone with you to drive you home as directed by your surgeon. 3. You may have a light breakfast and take normal morning medications. 4. We recommend you do not drink any alcoholic beverages for 24 hours before and after your procedure. 5. Contact your surgeons office for instructions on the following medications: non-steroidal anti-inflammatory drugs (i.e. Advil, Aleve), vitamins, and supplements. (Some surgeons will want you to stop these medications prior to surgery and others may allow you to take them)   **If you are currently taking Plavix, Coumadin, Aspirin and/or other blood-thinning agents, contact your surgeon for instructions. ** Your surgeon will partner with the physician prescribing these medications to determine if it is safe to stop or if you need to continue taking. Please do not stop taking these medications without instructions from your surgeon. 6. In an effort to help prevent surgical site infection, we ask that you shower with an anti-bacterial soap (i.e. Dial or Safeguard) on the morning of your procedure. Do not apply any lotions, powders, or deodorants after showering. 7. Wear comfortable clothes. Wear glasses instead of contacts. Do not bring any jewelry or money (other than copays or fees as instructed). Do not wear make-up, particularly mascara, the morning of your procedure. Wear your hair loose or down, no ponytails, buns, lupillo pins or clips.  All body piercings must be removed. 8. You should understand that if you do not follow these instructions your procedure may be cancelled. If your physical condition changes (i.e. fever, cold or flu) please contact your surgeon as soon as possible. 9. It is important that you be on time. If a situation occurs where you may be late, or if you have any questions or problems, please call [phone].    10. Your procedure time may be subject to change. You will receive a phone call the day prior to confirm your arrival time. I understand a pre-operative phone call will be made to verify my procedure time. In the event that I am not available, I give permission for a message to be left on my answering service and/or with another person?       yes    The above instructions were given to patient over the phone and he verbalized an understanding.      ___________________      ___________________      ___________________  (Signature of Patient)          (Witness)                   (Date and Time)

## 2019-10-15 ENCOUNTER — APPOINTMENT (OUTPATIENT)
Dept: GENERAL RADIOLOGY | Age: 84
End: 2019-10-15
Attending: PHYSICAL MEDICINE & REHABILITATION
Payer: MEDICARE

## 2019-10-15 ENCOUNTER — HOSPITAL ENCOUNTER (OUTPATIENT)
Age: 84
Setting detail: OUTPATIENT SURGERY
Discharge: HOME | End: 2019-10-15
Attending: PHYSICAL MEDICINE & REHABILITATION | Admitting: PHYSICAL MEDICINE & REHABILITATION
Payer: MEDICARE

## 2019-10-15 VITALS
WEIGHT: 156 LBS | DIASTOLIC BLOOD PRESSURE: 80 MMHG | BODY MASS INDEX: 24.48 KG/M2 | SYSTOLIC BLOOD PRESSURE: 139 MMHG | TEMPERATURE: 98.4 F | HEART RATE: 61 BPM | RESPIRATION RATE: 16 BRPM | OXYGEN SATURATION: 98 % | HEIGHT: 67 IN

## 2019-10-15 PROCEDURE — 74011000250 HC RX REV CODE- 250: Performed by: PHYSICAL MEDICINE & REHABILITATION

## 2019-10-15 PROCEDURE — 76000 FLUOROSCOPY <1 HR PHYS/QHP: CPT

## 2019-10-15 PROCEDURE — 72020 X-RAY EXAM OF SPINE 1 VIEW: CPT

## 2019-10-15 PROCEDURE — 76210000046 HC AMBSU PH II REC FIRST 0.5 HR: Performed by: PHYSICAL MEDICINE & REHABILITATION

## 2019-10-15 PROCEDURE — 76030000002 HC AMB SURG OR TIME FIRST 0.: Performed by: PHYSICAL MEDICINE & REHABILITATION

## 2019-10-15 RX ORDER — BUPIVACAINE HYDROCHLORIDE 5 MG/ML
10 INJECTION, SOLUTION EPIDURAL; INTRACAUDAL ONCE
Status: COMPLETED | OUTPATIENT
Start: 2019-10-15 | End: 2019-10-15

## 2019-10-15 RX ORDER — LIDOCAINE HYDROCHLORIDE 20 MG/ML
5 INJECTION, SOLUTION EPIDURAL; INFILTRATION; INTRACAUDAL; PERINEURAL ONCE
Status: COMPLETED | OUTPATIENT
Start: 2019-10-15 | End: 2019-10-15

## 2019-10-15 RX ORDER — METHYLPREDNISOLONE ACETATE 40 MG/ML
80 INJECTION, SUSPENSION INTRA-ARTICULAR; INTRALESIONAL; INTRAMUSCULAR; SOFT TISSUE ONCE
Status: DISCONTINUED | OUTPATIENT
Start: 2019-10-15 | End: 2019-10-15 | Stop reason: HOSPADM

## 2019-10-15 NOTE — H&P
Procedural Case Note    10/15/2019    (1:40 PM)    Jason Knight    1935   (80 y.o.)    [id number]    CC:  pain    ROS:   Complete ROS obtained, no CP, no SOB, no N or V    PMH:     Past Medical History:   Diagnosis Date    Arthritis     osteoarthritis    Cancer ([address]     prostate, basal cell CA nose, bladder CA    Diabetes (HCC)     borderline    GERD (gastroesophageal reflux disease)     Hypertension     Insomnia     Psychiatric disorder     Selenium deficiency     Staph infection     Both eyes       ALLERGIES:     Allergies   Allergen Reactions    Sulfa (Sulfonamide Antibiotics) Hives       MEDS:     Current Facility-Administered Medications   Medication Dose Route Frequency    bupivacaine (PF) (MARCAINE) 0.5 % (5 mg/mL) injection 50 mg  10 mL Epidural ONCE    lidocaine (PF) (XYLOCAINE) 20 mg/mL (2 %) injection 100 mg  5 mL Epidural ONCE    methylPREDNISolone acetate (DEPO-MEDROL) 40 mg/mL injection 80 mg  80 mg Epidural ONCE    iohexol (OMNIPAQUE) 180 mg iodine/mL injection 10 mL  10 mL Intra artICUlar ONCE    sodium bicarbonate (4%) (NEUT) injection 1 mL  1 mL SubCUTAneous ONCE          Visit Vitals  /68 (BP 1 Location: Left arm, BP Patient Position: At rest)   Pulse 61   Temp 98.3 °F (36.8 °C)   Resp 15   Ht 5' 7\" (1.702 m)   Wt 70.8 kg (156 lb)   SpO2 96%   BMI 24.43 kg/m²     PE:  Gen: NAD  Head: normocephalic  Heart: RRR  Lungs: CTA arelis  Abd: NT, ND, soft  Neuro: awake and alert  Skin: intact    IMPRESSION:   LS DJD    Note:  The clinical status was discussed in detail with the patient. The procedure was again discussed and described in detail. All understand and accept the planned procedure and risks; reject other forms of treatment. All questions are answered.     Mesha Uribe MD

## 2019-10-15 NOTE — DISCHARGE INSTRUCTIONS
Radiofrequency Ablation  Discharge Instructions    You had a radiofrequency ablation today. You will probably have some numbness in your lower back area for the next 6-8 hours. You should begin feeling better after a few days, but it may take up to 2 weeks to notice the difference. The benefit you get from your injection will last a variable amount of time, depending on the severity of your spine problem. · Pain:  Most people do not have any increase in pain after this injection. However, you might experience some soreness a few days at the site of the injection. If this happens, putting an ice pack over the sore area will help. · Bandage: You have a small bandage covering the site of the injection. You may remove it when you get home. · Restrictions:  Some one should drive you home after the injection. After that, you have no restrictions. You may resume your normal level of activity. You may take a shower or bath, and you may eat normally. You should continue your current exercise and/or therapy routine. Medications:  Continue your current medications as prescribed. If you pain decreases, you may reduce the amount of your pain medications. If you stopped taking anticoagulants or blood-thinners before the injection, start them tomorrow. Call Dr. Orestes Holbrook at [phone] if you experience:    · Fever (101 degrees Fahrenheit or greater)  · Nausea or vomiting  · Headache unrelieved by your normal pain medication  · Redness or swelling at the injection site that lasts more than 1 day  · New numbness, tingling, weakness, or pain that you didn't have before the injection      If still having pain in 1-2 weeks, call office at [phone] for a follow up appointment.         DISCHARGE SUMMARY from Nurse    The following personal items collected during your admission are returned to you:   Dental Appliance:    Vision:    Hearing Aid:    Jewelry:    Clothing:    Other Valuables:    Valuables sent to safe: If you were given prescriptions, please review the written information on prescribed medications. · You will receive a Post Operative Call from one of the Recovery Room Nurses on the day after your surgery to check on you. It is very important for us to know how you are recovering after your surgery. · You may receive an e-mail or letter in the mail from [employer] regarding your experience with us in the Ambulatory Surgery Unit. Your feedback is valuable to us and we appreciate your participation in the survey. If you have not had your influenza or pneumococcal vaccines, please follow up with your primary care physician. The discharge information has been reviewed with the patient. The patient verbalized understanding.

## 2019-10-15 NOTE — PERIOP NOTES
Permission received to review discharge instructions and discuss private health information with friend, Kelsey Fry. Hand  are equally strong, bilat. Leg pushes, bilat are equally strong. Right foot pull is weaker than left foot due to patient having drop foot to right side.

## 2019-10-15 NOTE — PERIOP NOTES
1430 Pt arrived to PACU. Strong bilateraly pedal push. Weak right pedal pull. Strong pedal pull. 1432 Discharge instructions reviewed with pt. Pt meets discharge critieria.

## 2019-10-15 NOTE — OP NOTES
PROCEDURES PERFORMED:   1. Radiofrequency neuroablation of the medial branches R L2, L3, L4 and L5    PREPROCEDURE DIAGNOSIS: lumbar spondylosis with chronic back pain. POST PROCEDURE DIAGNOSIS: lumbar spondylosis with chronic back pain. SURGEON: Corky Esteban M.D. HISTORY OF PRESENT ILLNESS AND INDICATIONS FOR THE PROCEDURE: This patient was previously given diagnostic successful blocks of the facet joints innervated by the aforementioned medial branches and is here today for ablation and neurolysis of those branches. She has previously failed conservative management which has proceeded to injection therapy. FINDINGS AND PROCEDURES:  Adequate informed consent was obtained and the patient was taken to the procedure room and placed in the prone position on the procedure table. A time out was held for patient verification. The patient had monitoring throughout the procedure at cycled one minute blood pressure, pulse, and pulse oximetry. The skin was prepped and draped in the normal sterile fashion. Using fluoroscopic guidance in anteroposterior, oblique, and lateral views, the appropriate superior articular processes and pedicles were identified. After identification 1 percent lidocaine skin anesthesia was administered at each site. Using a radiofrequency ablation needle, a 22 gauge 10 millimeter active tip was placed at the medial branch nerves, right L2, L3, L4 and L5. The initial impedance in Ohms was within acceptable limits. Each level was subsequently tested on sensory at 50 megahertz with no referred pattern of pain noted at any level. Each level was also tested at 2 Hertz with no distal motor movement or referred pain pattern. The lesion site was injected with 0.5 milliliters of 0.5 percent bupivacaine anesthesia of the nerve branch. Next a lesion was applied for 90 seconds at 80 degrees. The patient tolerated the procedure well with no apparent complications.  The patient was taken to the recovery area for further monitoring. Post procedure neurologic examination was consistent with preprocedure examination. The patient was discharged home ambulatory with family and was given post procedure instructions. FOLLOW-UP: The patient will have a follow-up appointment scheduled by the clinic in the coming weeks.

## 2019-11-29 DIAGNOSIS — G60.8 IDIOPATHIC SMALL AND LARGE FIBER SENSORY NEUROPATHY: ICD-10-CM

## 2019-11-29 DIAGNOSIS — M54.16 LUMBAR BACK PAIN WITH RADICULOPATHY AFFECTING LEFT LOWER EXTREMITY: ICD-10-CM

## 2019-11-29 DIAGNOSIS — I63.231 CEREBRAL INFARCTION INVOLVING RIGHT CAROTID ARTERY (HCC): ICD-10-CM

## 2019-11-29 DIAGNOSIS — M96.1 CERVICAL POST-LAMINECTOMY SYNDROME: ICD-10-CM

## 2019-11-29 DIAGNOSIS — M96.1 LUMBAR POST-LAMINECTOMY SYNDROME: ICD-10-CM

## 2019-11-29 DIAGNOSIS — E11.42 DIABETIC PERIPHERAL NEUROPATHY ASSOCIATED WITH TYPE 2 DIABETES MELLITUS (HCC): ICD-10-CM

## 2019-11-29 DIAGNOSIS — R41.3 MEMORY CHANGE: ICD-10-CM

## 2019-11-29 RX ORDER — GABAPENTIN 600 MG/1
TABLET ORAL
Qty: 270 TAB | Refills: 1 | Status: SHIPPED | OUTPATIENT
Start: 2019-11-29 | End: 2020-08-09

## 2020-02-04 ENCOUNTER — HOSPITAL ENCOUNTER (OUTPATIENT)
Dept: GENERAL RADIOLOGY | Age: 85
Discharge: HOME | End: 2020-02-04
Payer: MEDICARE

## 2020-02-04 DIAGNOSIS — J40 BRONCHITIS: ICD-10-CM

## 2020-02-04 DIAGNOSIS — R05.9 COUGH: ICD-10-CM

## 2020-02-04 PROCEDURE — 71046 X-RAY EXAM CHEST 2 VIEWS: CPT

## 2020-02-20 NOTE — PERIOP NOTES
[facility]  Ambulatory Surgery Unit  Pre-operative Instructions    Procedure Date  2/25/20            Tentative Arrival Time 8124      1. On the day of your procedure, please report to the Ambulatory Surgery Unit Registration Desk and sign in at your designated time. The Ambulatory Surgery Unit is located in [facility] on the [facility] side of the [facility] across from the [address] [place] [address]. Please have all of your health insurance cards and a photo ID. 2. You must have someone with you to drive you home as directed by your surgeon. 3. You may have a light breakfast and take normal morning medications. 4. We recommend you do not drink any alcoholic beverages for 24 hours before and after your procedure. 5. Contact your surgeons office for instructions on the following medications: non-steroidal anti-inflammatory drugs (i.e. Advil, Aleve), vitamins, and supplements. (Some surgeons will want you to stop these medications prior to surgery and others may allow you to take them)   **If you are currently taking Plavix, Coumadin, Aspirin and/or other blood-thinning agents, contact your surgeon for instructions. ** Your surgeon will partner with the physician prescribing these medications to determine if it is safe to stop or if you need to continue taking. Please do not stop taking these medications without instructions from your surgeon. 6. In an effort to help prevent surgical site infection, we ask that you shower with an anti-bacterial soap (i.e. Dial or Safeguard) on the morning of your procedure. Do not apply any lotions, powders, or deodorants after showering. 7. Wear comfortable clothes. Wear glasses instead of contacts. Do not bring any jewelry or money (other than copays or fees as instructed). Do not wear make-up, particularly mascara, the morning of your procedure. Wear your hair loose or down, no ponytails, buns, lupillo pins or clips.  All body piercings must be removed. 8. You should understand that if you do not follow these instructions your procedure may be cancelled. If your physical condition changes (i.e. fever, cold or flu) please contact your surgeon as soon as possible. 9. It is important that you be on time. If a situation occurs where you may be late, or if you have any questions or problems, please call [phone].    10. Your procedure time may be subject to change. You will receive a phone call the day prior to confirm your arrival time. I understand a pre-operative phone call will be made to verify my procedure time. In the event that I am not available, I give permission for a message to be left on my answering service and/or with another person?       Yes    Reviewed instructions with pt./ verbalizes understanding         ___________________      ___________________      ___________________  (Signature of Patient)          (Witness)                   (Date and Time)

## 2020-02-25 ENCOUNTER — APPOINTMENT (OUTPATIENT)
Dept: GENERAL RADIOLOGY | Age: 85
End: 2020-02-25
Attending: PHYSICAL MEDICINE & REHABILITATION
Payer: MEDICARE

## 2020-02-25 ENCOUNTER — HOSPITAL ENCOUNTER (OUTPATIENT)
Age: 85
Setting detail: OUTPATIENT SURGERY
Discharge: HOME | End: 2020-02-25
Attending: PHYSICAL MEDICINE & REHABILITATION | Admitting: PHYSICAL MEDICINE & REHABILITATION
Payer: MEDICARE

## 2020-02-25 VITALS
DIASTOLIC BLOOD PRESSURE: 71 MMHG | HEIGHT: 66 IN | HEART RATE: 67 BPM | WEIGHT: 154 LBS | TEMPERATURE: 97.7 F | RESPIRATION RATE: 18 BRPM | BODY MASS INDEX: 24.75 KG/M2 | SYSTOLIC BLOOD PRESSURE: 145 MMHG | OXYGEN SATURATION: 96 %

## 2020-02-25 LAB
GLUCOSE BLD STRIP.AUTO-MCNC: 95 MG/DL (ref 65–100)
SERVICE CMNT-IMP: NORMAL

## 2020-02-25 PROCEDURE — 74011636320 HC RX REV CODE- 636/320: Performed by: PHYSICAL MEDICINE & REHABILITATION

## 2020-02-25 PROCEDURE — 76000 FLUOROSCOPY <1 HR PHYS/QHP: CPT

## 2020-02-25 PROCEDURE — 74011250636 HC RX REV CODE- 250/636: Performed by: PHYSICAL MEDICINE & REHABILITATION

## 2020-02-25 PROCEDURE — 82962 GLUCOSE BLOOD TEST: CPT

## 2020-02-25 PROCEDURE — 72020 X-RAY EXAM OF SPINE 1 VIEW: CPT

## 2020-02-25 PROCEDURE — 76210000046 HC AMBSU PH II REC FIRST 0.5 HR: Performed by: PHYSICAL MEDICINE & REHABILITATION

## 2020-02-25 PROCEDURE — 76030000002 HC AMB SURG OR TIME FIRST 0.: Performed by: PHYSICAL MEDICINE & REHABILITATION

## 2020-02-25 PROCEDURE — 77030003665 HC NDL SPN BBMI -A: Performed by: PHYSICAL MEDICINE & REHABILITATION

## 2020-02-25 PROCEDURE — 74011000250 HC RX REV CODE- 250: Performed by: PHYSICAL MEDICINE & REHABILITATION

## 2020-02-25 RX ORDER — BUPIVACAINE HYDROCHLORIDE 5 MG/ML
5 INJECTION, SOLUTION EPIDURAL; INTRACAUDAL ONCE
Status: DISCONTINUED | OUTPATIENT
Start: 2020-02-25 | End: 2020-02-25 | Stop reason: HOSPADM

## 2020-02-25 RX ORDER — LIDOCAINE HYDROCHLORIDE 20 MG/ML
5 INJECTION, SOLUTION INFILTRATION; PERINEURAL ONCE
Status: COMPLETED | OUTPATIENT
Start: 2020-02-25 | End: 2020-02-25

## 2020-02-25 RX ORDER — METHYLPREDNISOLONE ACETATE 40 MG/ML
40 INJECTION, SUSPENSION INTRA-ARTICULAR; INTRALESIONAL; INTRAMUSCULAR; SOFT TISSUE ONCE
Status: COMPLETED | OUTPATIENT
Start: 2020-02-25 | End: 2020-02-25

## 2020-02-25 NOTE — OP NOTES
Epidural Steroid Injection Operative Report    Indications: This is a 80 y.o. male who presents with low back pain. He was positive for LS DDD. The patient was admitted for surgery as conservative measures have failed. Date of Surgery: 2/25/2020    Preoperative Diagnosis: LS DDD    Postoperative Diagnosis: LS DDD    Surgeon(s) and Role:     * Vicente Oconnor MD - Primary     Procedure:  Procedure(s):  LEFT L3 AND RIGHT L4 TRANSFORAMINAL EPIDURAL STEROID INJECTION    Procedure in Detail:  After appropriate informed consent was obtained, the patient was taken to the operating suite and placed in the prone position on the operating table on appropriate padding. The LS region was prepped and draped in the usual sterile fashion. Intraoperative fluoroscopy was used to localize the LS spine. The skin was infiltrated with 2% lidocaine. An 22-g needle was advanced into the Left L3 and Right L4 neuroforamen under fluoroscopic guidance. A small amount of contrast was injected into the epidural space, confirming appropriate needle placement on fluoroscopy. Next, 2ml of 2% lidocaine and 80mg of Depo-Medrol were injected. The needle was removed from the patient. The patient was then turned back into the supine position on the stretcher and was taken to the Recovery Room in stable condition.     Estimated Blood Loss:  none     Specimens: None       Drains: None          Complications:  None    Signed By: Beulah Kebede MD                        February 25, 2020

## 2020-02-25 NOTE — PERIOP NOTES
Patient received to PACU, VSS. Patient awake and alert, injection site to back intact. Neuro:  Push/Pull assessment:   LLE Response: strong push/pull   RLE Response: strong push/weak pull (foot drop on right side POA)    Discharge instructions given. Patient verbalized understanding of instructions and follow up appointment. 1705: Patient states ready for discharge. Patient discharged at this time by wheelchair with belongings, friend [facility] to provide transportation home.

## 2020-02-25 NOTE — H&P
Procedural Case Note    2/25/2020    (3:56 PM)    Prasanna Stands    1935   (80 y.o.)    [id number]    CC:  pain    ROS:   Complete ROS obtained, no CP, no SOB, no N or V    PMH:     Past Medical History:   Diagnosis Date    Arthritis     osteoarthritis    Basal cell carcinoma (BCC) of skin of nose     Bladder cancer (HCC)     Diabetes (HCC)     borderline    GERD (gastroesophageal reflux disease)     Hypertension     Insomnia     Prostate cancer (HCC)     Selenium deficiency     Staph infection     Both eyes       ALLERGIES:     Allergies   Allergen Reactions    Sulfa (Sulfonamide Antibiotics) Hives       MEDS:     Current Facility-Administered Medications   Medication Dose Route Frequency    bupivacaine (PF) (MARCAINE) 0.5 % (5 mg/mL) injection 25 mg  5 mL Epidural ONCE    methylPREDNISolone acetate (DEPO-MEDROL) 40 mg/mL injection 40 mg  40 mg IntraMUSCular ONCE    lidocaine (XYLOCAINE) 20 mg/mL (2 %) injection 100 mg  5 mL IntraDERMal ONCE    iohexoL (OMNIPAQUE) 180 mg iodine/mL injection 10 mL  10 mL Intra artICUlar ONCE          Visit Vitals  Ht 5' 6\" (1.676 m)   Wt 68 kg (150 lb)   BMI 24.21 kg/m²     PE:  Gen: NAD  Head: normocephalic  Heart: RRR  Lungs: CTA arelis  Abd: NT, ND, soft  Neuro: awake and alert  Skin: intact    IMPRESSION:   LS DDD    Note:  The clinical status was discussed in detail with the patient. The procedure was again discussed and described in detail. All understand and accept the planned procedure and risks; reject other forms of treatment. All questions are answered.     Rashaad Mills MD

## 2020-02-25 NOTE — PERIOP NOTES
Permission received to review discharge instructions and discuss private health information with friend Zia Vasquez.       Skin assessment:   WNL   Skin color: normal for ethnicity   Skin condition: intact   Skin integrity: intact   Turgor: normal for age    Neuro:  Push/Pull assessment:     LUE Response: strong   LLE Response: strong push/pull   RUE Response: strong   RLE Response: strong push, weak pull (patient has foot drop right foot)

## 2020-02-25 NOTE — PERIOP NOTES
Joaquin Player  1935  [id number]    Situation:  Verbal report given from: JAYDE West RN  Procedure: Procedure(s):  LEFT L3 AND RIGHT L4 TRANSFORAMINAL EPIDURAL STEROID INJECTION    Background:    Preoperative diagnosis: DEGENERATIVE DISC DISEASE, LUMBAR    Postoperative diagnosis: DEGENERATIVE DISC DISEASE, LUMBAR    :  Dr. Haylie Oropeza    Assistant(s): Circ-1: Aria Brennan RN  Local Nurse Monitor:  Tracey Rooney RN  Radiology Technician: Chacha Owen Asst-1: [facility] Breaker    Specimens: * No specimens in log *    Assessment:  Intra-procedure medications         Anesthesia gave intra-procedure sedation and medications, see anesthesia flow sheet     Intravenous fluids: LR@ KVO     Vital signs stable

## 2020-02-25 NOTE — DISCHARGE INSTRUCTIONS
Dr. Mekhi Martinez Discharge Instructions  Transforaminal Epidural Steroid Injection/ Selective Nerve Block    You had a transforaminal epidural steroid injection/ selective nerve block today. You will probably have some numbness, and possibly weakness, in your leg for the next 6 to 8 hours. The steroids will slowly become effective, reducing your pain, over the next 2 weeks. You should begin feeling better after a few days, but it may take up to 2 weeks to notice the difference. The benefit you get from your injection will last a variable amount of time, depending on the severity of your lumbar spine problem.  Pain: Most people do not have any increase in pain after this injection. However, you might experience some soreness in your low back. If this happens, putting an ice pack over the sore area will help.  Bandage: You will have a small bandage covering the site of the injection. You may remove it once you get home.  Restrictions: Someone should drive you home after the injection. After that, you have no restrictions. You need to be careful while walking, as you may still have some numbness or weakness in your leg. You may resume your normal level of activity. You may take a shower or bath, and you may eat normally. You should continue your current exercises and/or therapy routine.   Medications: Continue your current medications as prescribed. If your pain decreases, you may reduce the amount of your pain medicines. If you stopped taking anticoagulants or blood-thinners before the injection, start them tomorrow. If you have diabetes, your blood sugar may be elevated for a few days. Call your primary doctor with any questions.   Call Dr. Mekhi Martinez at [phone] if you experience:   Fever (101 degrees Fahrenheit or greater)   Nausea or vomiting   Headache unrelieved by your normal pain medicine   Redness or swelling at the injection site that lasts more than 1 day   New numbness, tingling, weakness, or pain that you didnt have before the injection    Follow-up appointment:   If still having pain in 1-2 weeks, call office at [phone] for a follow up appointment. DISCHARGE SUMMARY from Nurse    The following personal items collected during your admission are returned to you:   Dental Appliance: Dental Appliances: Partials, Lowers, Uppers  Vision:    Hearing Aid:    Jewelry: Jewelry: None  Clothing: Clothing: With patient  Other Valuables: Other Valuables: None  Valuables sent to safe: If you were given prescriptions, please review the written information on prescribed medications. · You will receive a Post Operative Call from one of the Recovery Room Nurses on the day after your surgery to check on you. It is very important for us to know how you are recovering after your surgery. · You may receive an e-mail or letter in the mail from [employer] regarding your experience with us in the Ambulatory Surgery Unit. Your feedback is valuable to us and we appreciate your participation in the survey. If you have not had your influenza or pneumococcal vaccines, please follow up with your primary care physician. The discharge information has been reviewed with the patient. The patient verbalized understanding.

## 2020-05-18 RX ORDER — DULOXETIN HYDROCHLORIDE 30 MG/1
CAPSULE, DELAYED RELEASE ORAL
Qty: 90 CAP | Refills: 4 | Status: SHIPPED | OUTPATIENT
Start: 2020-05-18 | End: 2021-07-16

## 2020-06-25 NOTE — PERIOP NOTES
[facility]  Ambulatory Surgery Unit  Pre-operative Instructions    Procedure Date  Tuesday, June 30, 2020            Tentative Arrival Time TBD      1. On the day of your procedure, please report to the Ambulatory Surgery Unit Registration Desk and sign in at your designated time. The Ambulatory Surgery Unit is located in [facility] on the [facility] side of the [facility] across from the [facility]. Please have all of your health insurance cards and a photo ID. 2. You must have someone with you to drive you home as directed by your surgeon. 3. You may have a light breakfast and take normal morning medications. 4. We recommend you do not drink any alcoholic beverages for 24 hours before and after your procedure. 5. Contact your surgeons office for instructions on the following medications: non-steroidal anti-inflammatory drugs (i.e. Advil, Aleve), vitamins, and supplements. (Some surgeons will want you to stop these medications prior to surgery and others may allow you to take them)   **If you are currently taking Plavix, Coumadin, Aspirin and/or other blood-thinning agents, contact your surgeon for instructions. ** Your surgeon will partner with the physician prescribing these medications to determine if it is safe to stop or if you need to continue taking. Please do not stop taking these medications without instructions from your surgeon. 6. In an effort to help prevent surgical site infection, we ask that you shower with an anti-bacterial soap (i.e. Dial or Safeguard) on the morning of your procedure. Do not apply any lotions, powders, or deodorants after showering. 7. Wear comfortable clothes. Wear glasses instead of contacts. Do not bring any jewelry or money (other than copays or fees as instructed). Do not wear make-up, particularly mascara, the morning of your procedure. Wear your hair loose or down, no ponytails, buns, lupillo pins or clips.  All body piercings must be removed. 8. You should understand that if you do not follow these instructions your procedure may be cancelled. If your physical condition changes (i.e. fever, cold or flu) please contact your surgeon as soon as possible. 9. It is important that you be on time. If a situation occurs where you may be late, or if you have any questions or problems, please call [phone].    10. Your procedure time may be subject to change. You will receive a phone call the day prior to confirm your arrival time. In an effort to improve the efficiency, privacy, and safety for all of our Pre-op patients visitors are not allowed in the Holding area. Once you arrive and are registered your family/visitors will be asked to remain in your vehicle. The Pre-op staff will call you when they are ready for you to enter the building and will explain to you and your family/visitors that the Pre-op phase is beginning. At this time, if your procedure is outpatient, your family member will be asked to stay in their vehicle until the surgery is complete and you are ready for discharge. If you are going to be admitted after your surgery, once you are called to come inside the building, your family will be able to leave the parking lot. At this time the staff will also ask for your designated spokesperson information in the event that the physician or staff need to provide an update or obtain any pertinent information. The designated spokesperson will be notified if the physician needs to speak to family during the pre-operative phase.and/or in the post op phase. I understand a pre-operative phone call will be made to verify my procedure time. In the event that I am not available, I give permission for a message to be left on my answering service and/or with another person?       yes    Preop instructions reviewed  Pt verbalized understanding.      ___________________      ___________________ ___________________  (Signature of Patient)          (Witness)                   (Date and Time)

## 2020-06-30 ENCOUNTER — APPOINTMENT (OUTPATIENT)
Dept: GENERAL RADIOLOGY | Age: 85
End: 2020-06-30
Attending: PHYSICAL MEDICINE & REHABILITATION
Payer: MEDICARE

## 2020-06-30 ENCOUNTER — HOSPITAL ENCOUNTER (OUTPATIENT)
Age: 85
Setting detail: OUTPATIENT SURGERY
Discharge: HOME | End: 2020-06-30
Attending: PHYSICAL MEDICINE & REHABILITATION | Admitting: PHYSICAL MEDICINE & REHABILITATION
Payer: MEDICARE

## 2020-06-30 VITALS
SYSTOLIC BLOOD PRESSURE: 135 MMHG | DIASTOLIC BLOOD PRESSURE: 74 MMHG | RESPIRATION RATE: 17 BRPM | HEART RATE: 67 BPM | WEIGHT: 152 LBS | HEIGHT: 67 IN | OXYGEN SATURATION: 96 % | BODY MASS INDEX: 23.86 KG/M2 | TEMPERATURE: 98.4 F

## 2020-06-30 PROCEDURE — 77030003665 HC NDL SPN BBMI -A: Performed by: PHYSICAL MEDICINE & REHABILITATION

## 2020-06-30 PROCEDURE — 76030000002 HC AMB SURG OR TIME FIRST 0.: Performed by: PHYSICAL MEDICINE & REHABILITATION

## 2020-06-30 PROCEDURE — 74011250636 HC RX REV CODE- 250/636: Performed by: PHYSICAL MEDICINE & REHABILITATION

## 2020-06-30 PROCEDURE — 74011000250 HC RX REV CODE- 250: Performed by: PHYSICAL MEDICINE & REHABILITATION

## 2020-06-30 PROCEDURE — 76210000046 HC AMBSU PH II REC FIRST 0.5 HR: Performed by: PHYSICAL MEDICINE & REHABILITATION

## 2020-06-30 PROCEDURE — 76000 FLUOROSCOPY <1 HR PHYS/QHP: CPT

## 2020-06-30 PROCEDURE — 72020 X-RAY EXAM OF SPINE 1 VIEW: CPT

## 2020-06-30 RX ORDER — BUPIVACAINE HYDROCHLORIDE 5 MG/ML
5 INJECTION, SOLUTION EPIDURAL; INTRACAUDAL ONCE
Status: COMPLETED | OUTPATIENT
Start: 2020-06-30 | End: 2020-06-30

## 2020-06-30 RX ORDER — LIDOCAINE HYDROCHLORIDE 20 MG/ML
5 INJECTION, SOLUTION INFILTRATION; PERINEURAL ONCE
Status: COMPLETED | OUTPATIENT
Start: 2020-06-30 | End: 2020-06-30

## 2020-06-30 RX ORDER — METHYLPREDNISOLONE ACETATE 40 MG/ML
40 INJECTION, SUSPENSION INTRA-ARTICULAR; INTRALESIONAL; INTRAMUSCULAR; SOFT TISSUE ONCE
Status: COMPLETED | OUTPATIENT
Start: 2020-06-30 | End: 2020-06-30

## 2020-06-30 NOTE — OP NOTES
Epidural Steroid Injection Operative Report    Indications: This is a 80 y.o. male who presents with low back pain. He was positive for LS DDD. The patient was admitted for surgery as conservative measures have failed. Date of Surgery: 6/30/2020    Preoperative Diagnosis: LS DDD    Postoperative Diagnosis: LS DDD    Surgeon(s) and Role:     * Eva Cano MD - Primary     Procedure:  Procedure(s):  BILATERAL L4 TRANSFORAMINAL EPIDURAL STEROID INJECTION    Procedure in Detail:  After appropriate informed consent was obtained, the patient was taken to the operating suite and placed in the prone position on the operating table on appropriate padding. The LS region was prepped and draped in the usual sterile fashion. Intraoperative fluoroscopy was used to localize the LS spine. The skin was infiltrated with 2% lidocaine. An 22-g needle was advanced into the Simone L4 neuroforamen under fluoroscopic guidance. A small amount of contrast was injected into the epidural space, confirming appropriate needle placement on fluoroscopy. Next, 2ml of 2% lidocaine and 80mg of Depo-Medrol were injected. The needle was removed from the patient. The patient was then turned back into the supine position on the stretcher and was taken to the Recovery Room in stable condition.     Estimated Blood Loss:  none     Specimens: None       Drains: None          Complications:  None    Signed By: Gal Flynn MD                        June 30, 2020

## 2020-06-30 NOTE — H&P
Procedural Case Note    6/30/2020    (1:28 PM)    Martín Freeman    1935   (80 y.o.)    [id number]    CC:  pain    ROS:   Complete ROS obtained, no CP, no SOB, no N or V    PMH:     Past Medical History:   Diagnosis Date    Arthritis     osteoarthritis    Basal cell carcinoma (BCC) of skin of nose     Bladder cancer (HCC)     Diabetes (HCC)     borderline    GERD (gastroesophageal reflux disease)     Hypertension     Insomnia     Prostate cancer ([facility] [address]     Selenium deficiency     Staph infection     Both eyes       ALLERGIES:     Allergies   Allergen Reactions    Sulfa (Sulfonamide Antibiotics) Hives       MEDS:     No current facility-administered medications for this encounter. Visit Vitals  /80 (BP Patient Position: At rest)   Pulse 65   Temp 98.4 °F (36.9 °C)   Resp 16   Ht 5' 7\" (1.702 m)   Wt 68.9 kg (152 lb)   SpO2 96%   BMI 23.81 kg/m²     PE:  Gen: NAD  Head: normocephalic  Heart: RRR  Lungs: CTA arelis  Abd: NT, ND, soft  Neuro: awake and alert  Skin: intact    IMPRESSION:   LS DDD    Note:  The clinical status was discussed in detail with the patient. The procedure was again discussed and described in detail. All understand and accept the planned procedure and risks; reject other forms of treatment. All questions are answered.     Cecil Kang MD

## 2020-06-30 NOTE — DISCHARGE INSTRUCTIONS
Dr. Dominik Shields Discharge Instructions  Transforaminal Epidural Steroid Injection/ Selective Nerve Block    You had a transforaminal epidural steroid injection/ selective nerve block today. You will probably have some numbness, and possibly weakness, in your leg for the next 6 to 8 hours. The steroids will slowly become effective, reducing your pain, over the next 2 weeks. You should begin feeling better after a few days, but it may take up to 2 weeks to notice the difference. The benefit you get from your injection will last a variable amount of time, depending on the severity of your lumbar spine problem.  Pain: Most people do not have any increase in pain after this injection. However, you might experience some soreness in your low back. If this happens, putting an ice pack over the sore area will help.  Bandage: You will have a small bandage covering the site of the injection. You may remove it once you get home.  Restrictions: Someone should drive you home after the injection. After that, you have no restrictions. You need to be careful while walking, as you may still have some numbness or weakness in your leg. You may resume your normal level of activity. You may take a shower or bath, and you may eat normally. You should continue your current exercises and/or therapy routine.   Medications: Continue your current medications as prescribed. If your pain decreases, you may reduce the amount of your pain medicines. If you stopped taking anticoagulants or blood-thinners before the injection, start them tomorrow. If you have diabetes, your blood sugar may be elevated for a few days. Call your primary doctor with any questions.   Call Dr. Dominik Shields at [phone] if you experience:   Fever (101 degrees Fahrenheit or greater)   Nausea or vomiting   Headache unrelieved by your normal pain medicine   Redness or swelling at the injection site that lasts more than 1 day   New numbness, tingling, weakness, or pain that you didnt have before the injection    Follow-up appointment:   If still having pain in 1-2 weeks, call office at [phone] for a follow up appointment. DISCHARGE SUMMARY from Nurse    The following personal items collected during your admission are returned to you:   Dental Appliance: Dental Appliances: Uppers, Partials, Lowers  Vision: Visual Aid: None  Hearing Aid:    Jewelry:    Clothing:    Other Valuables:    Valuables sent to safe: If you were given prescriptions, please review the written information on prescribed medications. · You will receive a Post Operative Call from one of the Recovery Room Nurses on the day after your surgery to check on you. It is very important for us to know how you are recovering after your surgery. · You may receive an e-mail or letter in the mail from [employer] regarding your experience with us in the Ambulatory Surgery Unit. Your feedback is valuable to us and we appreciate your participation in the survey. If you have not had your influenza or pneumococcal vaccines, please follow up with your primary care physician. The discharge information has been reviewed with the patient. The patient verbalized understanding. Patient Education        Learning About Coronavirus [phone])  Coronavirus [phone]): Overview  What is coronavirus (QERGQ-19)? The coronavirus disease (COVID-19) is caused by a virus. It is an illness that was first found in [place], [place], in December 2019. It has since spread worldwide. The virus can cause fever, cough, and trouble breathing. In severe cases, it can cause pneumonia and make it hard to breathe without help. It can cause death. Coronaviruses are a large group of viruses. They cause the common cold. They also cause more serious illnesses like Middle East respiratory syndrome (MERS) and severe acute respiratory syndrome (SARS). COVID-19 is caused by a novel coronavirus.  That means it's a new type that has not been seen in people before. This virus spreads person-to-person through droplets from coughing and sneezing. It can also spread when you are close to someone who is infected. And it can spread when you touch something that has the virus on it, such as a doorknob or a tabletop. What can you do to protect yourself from coronavirus (COVID-19)? The best way to protect yourself from getting sick is to:  · Avoid areas where there is an outbreak. · Avoid contact with people who may be infected. · Wash your hands often with soap or alcohol-based hand sanitizers. · Avoid crowds and try to stay at least 6 feet away from other people. · Wash your hands often, especially after you cough or sneeze. Use soap and water, and scrub for at least 20 seconds. If soap and water aren't available, use an alcohol-based hand . · Avoid touching your mouth, nose, and eyes. What can you do to avoid spreading the virus to others? To help avoid spreading the virus to others:  · Cover your mouth with a tissue when you cough or sneeze. Then throw the tissue in the trash. · Use a disinfectant to clean things that you touch often. · Wear a cloth face cover if you have to go to public areas. · Stay home if you are sick or have been exposed to the virus. Don't go to school, work, or public areas. And don't use public transportation, ride-shares, or taxis unless you have no choice. · If you are sick:  ? Leave your home only if you need to get medical care. But call the doctor's office first so they know you're coming. And wear a face cover. ? Wear the face cover whenever you're around other people. It can help stop the spread of the virus when you cough or sneeze. ? Clean and disinfect your home every day. Use household  and disinfectant wipes or sprays. Take special care to clean things that you grab with your hands.  These include doorknobs, remote controls, phones, and handles on your refrigerator and microwave. And don't forget countertops, tabletops, bathrooms, and computer keyboards. When to call for help  [id number] anytime you think you may need emergency care. For example, call if:  · You have severe trouble breathing. (You can't talk at all.)  · You have constant chest pain or pressure. · You are severely dizzy or lightheaded. · You are confused or can't think clearly. · Your face and lips have a blue color. · You pass out (lose consciousness) or are very hard to wake up. Call your doctor now if you develop symptoms such as:  · Shortness of breath. · Fever. · Cough. If you need to get care, call ahead to the doctor's office for instructions before you go. Make sure you wear a face cover to prevent exposing other people to the virus. Where can you get the latest information? The following health organizations are tracking and studying this virus. Their websites contain the most up-to-date information. Ivy Durbinr also learn what to do if you think you may have been exposed to the virus. · U.S. Centers for Disease Control and Prevention (CDC): The CDC provides updated news about the disease and travel advice. The website also tells you how to prevent the spread of infection. www.cdc.gov  · World Health Organization [facility]): WHO offers information about the virus outbreaks. WHO also has travel advice. www.who.int  Current as of: May 8, 2020               Content Version: 12.5  © 0778-6035 Healthwise, Incorporated. Care instructions adapted under license by [employer] (which disclaims liability or warranty for this information). If you have questions about a medical condition or this instruction, always ask your healthcare professional. [place] [address] any warranty or liability for your use of this information.

## 2020-06-30 NOTE — PERIOP NOTES
Patient received to PACU, VSS. Patient awake and alert, injection sites to back intact. Neuro:  Push/Pull assessment:     LLE Response: strong push/pull   RLE Response: weak push/pull (patient states foot drop on right foot)    Patient tolerating liquids and crackers without issue. 1500: Patient discharged by wheelchair at this time. Janet Lan to provide transport home.

## 2020-08-09 DIAGNOSIS — G60.8 IDIOPATHIC SMALL AND LARGE FIBER SENSORY NEUROPATHY: ICD-10-CM

## 2020-08-09 DIAGNOSIS — M96.1 LUMBAR POST-LAMINECTOMY SYNDROME: ICD-10-CM

## 2020-08-09 DIAGNOSIS — I63.231 CEREBRAL INFARCTION INVOLVING RIGHT CAROTID ARTERY (HCC): ICD-10-CM

## 2020-08-09 DIAGNOSIS — M96.1 CERVICAL POST-LAMINECTOMY SYNDROME: ICD-10-CM

## 2020-08-09 DIAGNOSIS — E11.42 DIABETIC PERIPHERAL NEUROPATHY ASSOCIATED WITH TYPE 2 DIABETES MELLITUS (HCC): ICD-10-CM

## 2020-08-09 DIAGNOSIS — R41.3 MEMORY CHANGE: ICD-10-CM

## 2020-08-09 DIAGNOSIS — M54.16 LUMBAR BACK PAIN WITH RADICULOPATHY AFFECTING LEFT LOWER EXTREMITY: ICD-10-CM

## 2020-08-09 RX ORDER — GABAPENTIN 600 MG/1
TABLET ORAL
Qty: 270 TAB | Refills: 0 | Status: SHIPPED | OUTPATIENT
Start: 2020-08-09 | End: 2020-08-27 | Stop reason: SDUPTHER

## 2020-08-27 ENCOUNTER — OFFICE VISIT (OUTPATIENT)
Dept: NEUROLOGY | Age: 85
End: 2020-08-27
Payer: MEDICARE

## 2020-08-27 VITALS
DIASTOLIC BLOOD PRESSURE: 60 MMHG | BODY MASS INDEX: 23.54 KG/M2 | WEIGHT: 150 LBS | SYSTOLIC BLOOD PRESSURE: 124 MMHG | RESPIRATION RATE: 16 BRPM | HEART RATE: 72 BPM | TEMPERATURE: 98 F | HEIGHT: 67 IN | OXYGEN SATURATION: 98 %

## 2020-08-27 DIAGNOSIS — M96.1 LUMBAR POST-LAMINECTOMY SYNDROME: ICD-10-CM

## 2020-08-27 DIAGNOSIS — I67.89 CEREBRAL MICROVASCULAR DISEASE: ICD-10-CM

## 2020-08-27 DIAGNOSIS — E11.42 DIABETIC PERIPHERAL NEUROPATHY ASSOCIATED WITH TYPE 2 DIABETES MELLITUS (HCC): ICD-10-CM

## 2020-08-27 DIAGNOSIS — G60.8 IDIOPATHIC SMALL AND LARGE FIBER SENSORY NEUROPATHY: ICD-10-CM

## 2020-08-27 DIAGNOSIS — I65.23 BILATERAL CAROTID ARTERY STENOSIS: ICD-10-CM

## 2020-08-27 DIAGNOSIS — M54.16 LUMBAR BACK PAIN WITH RADICULOPATHY AFFECTING LEFT LOWER EXTREMITY: ICD-10-CM

## 2020-08-27 DIAGNOSIS — H53.462 LEFT HOMONYMOUS HEMIANOPSIA: ICD-10-CM

## 2020-08-27 DIAGNOSIS — H91.13 PRESBYCUSIS OF BOTH EARS: ICD-10-CM

## 2020-08-27 DIAGNOSIS — M96.1 CERVICAL POST-LAMINECTOMY SYNDROME: ICD-10-CM

## 2020-08-27 DIAGNOSIS — I63.231 CEREBRAL INFARCTION INVOLVING RIGHT CAROTID ARTERY (HCC): Primary | ICD-10-CM

## 2020-08-27 DIAGNOSIS — R41.3 MEMORY CHANGE: ICD-10-CM

## 2020-08-27 PROCEDURE — G8536 NO DOC ELDER MAL SCRN: HCPCS | Performed by: PSYCHIATRY & NEUROLOGY

## 2020-08-27 PROCEDURE — G8427 DOCREV CUR MEDS BY ELIG CLIN: HCPCS | Performed by: PSYCHIATRY & NEUROLOGY

## 2020-08-27 PROCEDURE — G8432 DEP SCR NOT DOC, RNG: HCPCS | Performed by: PSYCHIATRY & NEUROLOGY

## 2020-08-27 PROCEDURE — 99214 OFFICE O/P EST MOD 30 MIN: CPT | Performed by: PSYCHIATRY & NEUROLOGY

## 2020-08-27 PROCEDURE — G8420 CALC BMI NORM PARAMETERS: HCPCS | Performed by: PSYCHIATRY & NEUROLOGY

## 2020-08-27 PROCEDURE — 1101F PT FALLS ASSESS-DOCD LE1/YR: CPT | Performed by: PSYCHIATRY & NEUROLOGY

## 2020-08-27 RX ORDER — GABAPENTIN 600 MG/1
600 TABLET ORAL 3 TIMES DAILY
Qty: 270 TAB | Refills: 1 | Status: SHIPPED | OUTPATIENT
Start: 2020-08-27 | End: 2021-04-29

## 2020-08-27 NOTE — LETTER
8/27/20 Patient: Oliver Banuelos YOB: 1935 Date of Visit: 8/27/2020 Maximiliano Black MD 
[address] [place] [address] VIA Facsimile: [phone] Dear Maximiliano Black MD, Thank you for referring Mr. Oliver Banuelos to [address] [place] [address] for evaluation. My notes for this consultation are attached. Consult Subjective:  
 
Oliver Banuelos is a 80 y.o. Right-handed  male seen for evaluation at the request of Dr. Makayla Cortes, of problem of having more trouble with his hearing, and we had to yell to get ourselves heard by the patient, he is going to get new hearing aids, and he has a problem of bad neck pain and back pain and currently sees Dr. Karen Aj with injections periodically, and they seem to be helping quite a bit. He still has a lot of pain radiating into his right leg associated with some weakness and atrophy of the leg muscles, but is not a surgical candidate according to his orthopedist.  He also has some diabetes and diabetic neuropathy and that still bothering him, and he would like to get blood test done because is not had any in almost a year. We order those for him today and ordered a carotid Doppler to follow-up on his previous vascular disease. We also refilled his gabapentin which seems to be controlling his pain but he has to be careful because of drowsiness when he takes 3 a day but often only takes 2 a day unless the pain is severe. He still has muscle tension type headaches, but they seem to be stable and not progressive. He is trying to avoid clenching his teeth. He also has chronic back pain from his postlaminectomy syndrome and spinal stenosis, but does not want back surgery. He has bilateral L5 motor radiculopathy right greater than left and wears an AFO brace on the right leg because of foot drop. Evelin Castillo   He has seen his pain management doctor Dr. Karen Aj, and they did do several injections in his neck to help with the pain and they did x-rays in January that shows he does have moderate arthritis at multiple levels. We offered him physical therapy but he said he is used all his physical therapy, and no longer gets it covered, we will give him exercises to do on his after visit summary. We explained cervicogenic headaches from tight muscles and cervical arthritis, and occipital neuralgia is most likely the combination of pain that he had. He denies any recent fever, meningismus, or trauma to the head or neck, and no new focal weakness or sensory loss in his arms and legs. He is also seen for his diabetic neuropathy, and find that the Cymbalta seemed to help,  30 mg. He has had no recent visual changes or any other focal neurologic deficit, no fever no meningismus, no recent trauma, and no other cause for his symptoms. He had complaints of generalized fatigue and weakness because he can exercise because of his chronic back pain and neck pain and arthritis, but all his work-up for any treatable neuromuscular disease were negative including myasthenia titers, thyroid, B12, and his CPK was normal, and all his connective tissue screen of SHAYNE and sed rate were normal.  He walks very slowly with a marked limp. The last hemoglobin A1c I see was 6.3 done 2 years ago. 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 He takes 1-1/2 50 mg trazodone at nighttime. Patient's EMG study was done in December 2016 and also showed a chronic L5 radiculopathy on the right and left side and the right borderline carpal tunnel syndrome on the right side. He has a post lumbar laminectomy syndrome with chronic pain.   Patient was initially seen 2 years ago for evaluation of sudden loss of vision with a left peripheral visual field cut, that resolved spontaneously on its own and MRI scans did not show any clear stroke and he had no intracranial or extracranial significant vascular disease. He is stable on 81 mg of aspirin every day and not had a recurrent stroke like symptoms. His carotid Doppler study showed no significant stenosis 2 years ago, but he has had no recurrent Dopplers in 2 years to we will repeat that next week. . No other retinal cause for his visual loss was found. He has no major cognitive issues, but does have some mild recent memory loss at times, but does not really feel it needs an evaluation, and his wife initially seemed concerned but then says she doesn't think he needs an evaluation either. Patient has no known history of previous stroke, no significant headache except a mild chronic generalized headache ever since the fall, no prior history of visual loss, and no other new focal weakness or sensory loss except for the numbness in his feet. Patient has no jaw claudication or other PMR symptoms. Past Medical History:  
Diagnosis Date  Arthritis   
 osteoarthritis  Basal cell carcinoma (BCC) of skin of nose  Bladder cancer ([facility] [address]  Diabetes ([address] borderline  GERD (gastroesophageal reflux disease)  Hypertension  Insomnia  Prostate cancer ([address]  Selenium deficiency  Staph infection Both eyes Past Surgical History:  
Procedure Laterality Date  HX CARPAL TUNNEL RELEASE Left 2012  HX CARPAL TUNNEL RELEASE Right 2015  HX CATARACT REMOVAL Right 2009  HX CATARACT REMOVAL Left 2019  HX ORTHOPAEDIC    
 cervical spine spacers placed  HX ORTHOPAEDIC  [id number]  
 lumbar (back surgery)  HX ORTHOPAEDIC Right   
 wrist-Reconstructed after Carpal Tunnel Release  HX OTHER SURGICAL  10/2011  
 basal cell CA nose  HX PROSTATECTOMY  1998  
 due to [address] [place] [address] Right 2009 Dr. Oza Kocher  HX SEPTOPLASTY  HX UROLOGICAL skin ca inside of my bladder,  
 
Family History Problem Relation Age of Onset Georgina Christine Other Mother   
     old age  Other Father   
     typhoid fever  Hypertension Brother  Asthma Brother  Cancer Brother   
     esophageal  
 Diabetes Brother  Arthritis-osteo Brother Social History Tobacco Use  Smoking status: Never Smoker  Smokeless tobacco: Former User  Tobacco comment: chewing tobacco quit years ago Substance Use Topics  Alcohol use: Yes Alcohol/week: 2.0 standard drinks Types: 2 Shots of liquor per week Current Outpatient Medications:  
  gabapentin (NEURONTIN) 600 mg tablet, Take 1 Tab by mouth three (3) times daily. Max Daily Amount: 1,800 mg., Disp: 270 Tab, Rfl: 1   DULoxetine (CYMBALTA) 30 mg capsule, TAKE ONE CAPSULE BY MOUTH DAILY, Disp: 90 Cap, Rfl: 4   cholecalciferol (VITAMIN D3) (1000 Units /25 mcg) tablet, Take  by mouth daily. , Disp: , Rfl:  
  tadalafil (CIALIS) 20 mg tablet, Take 20 mg by mouth as needed for Erectile Dysfunction. , Disp: , Rfl:  
  potassium 99 mg tablet, Take 99 mg by mouth daily. , Disp: , Rfl:  
  magnesium 250 mg tab, Take 1 Tab by mouth daily. , Disp: , Rfl:  
  ferrous fumarate/vit Bcomp,C (SUPER B COMPLEX PO), Take 1 Tab by mouth daily. , Disp: , Rfl:  
  RESTASIS 0.05 % ophthalmic emulsion, Administer 1 Drop to both eyes every twelve (12) hours. , Disp: , Rfl:  
  celecoxib (CELEBREX) 200 mg capsule, Take 200 mg by mouth daily. , Disp: , Rfl:  
  lisinopril (PRINIVIL, ZESTRIL) 10 mg tablet, Take 10 mg by mouth daily. , Disp: , Rfl:  
  traZODone (DESYREL) 50 mg tablet, Take 100 mg by mouth nightly., Disp: , Rfl:  
  copper gluconate 2 mg tab tablet, Take 2 mg by mouth daily. , Disp: , Rfl:  
  FOLIC ACID PO, Take 1 Tab by mouth daily. , Disp: , Rfl:  
  SALMON OIL/OMEGA-3 FATTY ACIDS (SALMON OIL-1000 PO), Take 1 Tab by mouth daily. , Disp: , Rfl:  
   SACCHAROMYCES BOULARDII (PROBIOTIC, S.BOULARDII, PO), Take 1 Tab by mouth daily. , Disp: , Rfl:  
  omeprazole (PRILOSEC) 40 mg capsule, Take 40 mg by mouth daily. , Disp: , Rfl:  
  pyridoxine (VITAMIN B-6) 200 mg tablet, Take 200 mg by mouth daily. , Disp: , Rfl:  
  nicotinic acid (NIACIN) 500 mg tablet, Take 500 mg by mouth Daily (before breakfast). , Disp: , Rfl:  
  GLUCOSAMINE HCL/CHONDR WELDON A NA (GLUCOSAMINE-CHONDROITIN) 750-600 mg Tab, Take 1,500 mg by mouth two (2) times a day.  , Disp: , Rfl:  
  multivitamin (ONE A DAY) tablet, Take 1 Tab by mouth daily. , Disp: , Rfl:  
 
 
 
Allergies Allergen Reactions  Sulfa (Sulfonamide Antibiotics) Hives Review of Systems: A comprehensive review of systems was negative except for: Eyes: positive for visual disturbance Musculoskeletal: positive for myalgias, arthralgias, stiff joints and back pain Neurological: positive for headaches, memory problems, paresthesia and gait problems Vitals:  
 08/27/20 1049 BP: 124/60 Pulse: 72 Resp: 16 Temp: 98 °F (36.7 °C) SpO2: 98% Weight: 150 lb (68 kg) Height: 5' 7\" (1.702 m) Objective: I 
 
 
NEUROLOGICAL EXAM: 
 
Appearance: The patient is well developed, well nourished, provides a coherent history and is in no acute distress. Mental Status: Oriented to time, place and person, and the president, cognitive function is a bit slow but probably normal and speech is fluent and no aphasia or dysarthria. Mood and affect appropriate. Cranial Nerves:   Intact visual fields. Fundi are benign, discs are flat. MOISES, EOM's full, no nystagmus, no ptosis. Facial sensation is normal. Corneal reflexes are not tested. Facial movement is symmetric. Hearing is abnormal bilaterally. Palate is midline with normal sternocleidomastoid and trapezius muscles are normal. Tongue is midline. Neck without meningismus or bruits Neck has limited range of motion in all directions, and the muscles are very tight at the craniocervical junction Patient has no temporal artery tenderness or enlargement Motor:  5/5 strength in upper and lower proximal and distal muscles except for a foot drop on the right side from his previous back problems, and foot drop on the right. Normal bulk and tone. No fasciculations. Patient has very stiff lower lumbar spine with muscle spasms present and decreased range of motion Rapid altering movement is a little slow but symmetric Reflexes:   Deep tendon reflexes 1+/4 and symmetrical, absent ankle jerks bilaterally. No babinski or clonus present Sensory:   Abnormal to touch, pinprick and vibration and temperature in both feet to midcalf level. DSS is intact Gait:  Abnormal gait because of his back pain he moves slowly. Tremor:   No tremor noted. Cerebellar:  Mildly abnormal Romberg and tandem cerebellar signs present, but finger-nose-finger examination shows no major tremor. [facility] Neurovascular:  Normal heart sounds and regular rhythm, peripheral pulses decreased, and no carotid bruits. Assessment: ICD-10-CM ICD-9-CM 1. Cerebral infarction involving right carotid artery (HCC)  I63.231 433.11 DUPLEX CAROTID BILATERAL  
   gabapentin (NEURONTIN) 600 mg tablet METABOLIC PANEL, COMPREHENSIVE  
   CBC WITH AUTOMATED DIFF  
   HEMOGLOBIN A1C WITH EAG  
2. Idiopathic small and large fiber sensory neuropathy  G60.8 356.4 DUPLEX CAROTID BILATERAL  
   gabapentin (NEURONTIN) 600 mg tablet METABOLIC PANEL, COMPREHENSIVE  
   CBC WITH AUTOMATED DIFF  
   HEMOGLOBIN A1C WITH EAG 3. Cervical post-laminectomy syndrome  M96.1 722.81 DUPLEX CAROTID BILATERAL  
   gabapentin (NEURONTIN) 600 mg tablet METABOLIC PANEL, COMPREHENSIVE  
   CBC WITH AUTOMATED DIFF  
   HEMOGLOBIN A1C WITH EAG 4. Left homonymous hemianopsia  H53.462 368.46 DUPLEX CAROTID BILATERAL  
   gabapentin (NEURONTIN) 600 mg tablet    METABOLIC PANEL, COMPREHENSIVE  
 CBC WITH AUTOMATED DIFF  
   HEMOGLOBIN A1C WITH EAG 5. Lumbar back pain with radiculopathy affecting left lower extremity  M54.16 724.4 DUPLEX CAROTID BILATERAL  
   gabapentin (NEURONTIN) 600 mg tablet METABOLIC PANEL, COMPREHENSIVE  
   CBC WITH AUTOMATED DIFF  
   HEMOGLOBIN A1C WITH EAG 6. Memory change  R41.3 780.93 DUPLEX CAROTID BILATERAL  
   gabapentin (NEURONTIN) 600 mg tablet METABOLIC PANEL, COMPREHENSIVE  
   CBC WITH AUTOMATED DIFF  
   HEMOGLOBIN A1C WITH EAG 7. Cerebral microvascular disease  I67.9 437.9 DUPLEX CAROTID BILATERAL  
   gabapentin (NEURONTIN) 600 mg tablet METABOLIC PANEL, COMPREHENSIVE  
   CBC WITH AUTOMATED DIFF  
   HEMOGLOBIN A1C WITH EAG 8. Presbycusis of both ears  H91.13 388.01 DUPLEX CAROTID BILATERAL  
   gabapentin (NEURONTIN) 600 mg tablet METABOLIC PANEL, COMPREHENSIVE  
   CBC WITH AUTOMATED DIFF  
   HEMOGLOBIN A1C WITH EAG  
9. Diabetic peripheral neuropathy associated with type 2 diabetes mellitus (HCC)  E11.42 250.60 DUPLEX CAROTID BILATERAL  
  357.2 gabapentin (NEURONTIN) 600 mg tablet METABOLIC PANEL, COMPREHENSIVE  
   CBC WITH AUTOMATED DIFF  
   HEMOGLOBIN A1C WITH EAG 10. Lumbar post-laminectomy syndrome  M96.1 722.83 DUPLEX CAROTID BILATERAL  
   gabapentin (NEURONTIN) 600 mg tablet METABOLIC PANEL, COMPREHENSIVE  
   CBC WITH AUTOMATED DIFF  
   HEMOGLOBIN A1C WITH EAG 11. Bilateral carotid artery stenosis  I65.23 433.10 DUPLEX CAROTID BILATERAL  
  433.30 gabapentin (NEURONTIN) 600 mg tablet METABOLIC PANEL, COMPREHENSIVE  
   CBC WITH AUTOMATED DIFF  
   HEMOGLOBIN A1C WITH EAG Plan:  
 
Patient with new problem of needing labs to check his glucose and to reassess his medication for his diabetic neuropathy, and his labs were sent off at his request which is not had any blood work in a year. Zenobia Ask He is able to control most of his pain with the Cymbalta 30 mg once a day at bedtime and the gabapentin 600 mg 2-3 times a day and these medications were all refilled for the patient. Patient doing better with headaches, and he stays stable with pain management with Dr. Juan Walker and his PCP is doing all his medications. Patient also off sleeping pills, and is sleeping better just on the trazodone after we advised him of the increased risk of death with sleeping pills He also has a chronic bilateral L5 radiculopathy with dorsiflexor weakness right side side greater with new AFO brace which helps him walk on the right He is also to continue multivitamins and vitamin D on a regular basis remained between physically active Patient had a complicated history of some visual loss on the left visual field after head injury, that seems better already, an MRI scan shows no clear stroke as a cause Carotid Dopplers showed no significant stenosis at last visit 2 years ago and will be repeated next week Sedimentation rate to rule out temporal arteritis was normal 
He is encouraged to take a baby aspirin every day, multivitamin every day, and vitamin D every day. He has remained mentally and physically active 35 minutes spent with the patient, reviewing his records on the computer, reviewing his labs, reviewing his EMG and labs computer system today, and deciding he most likely has a diabetic neuropathy. He may have some mild cognitive impairment but we will defer workup for that at this time as requested by the patient Followup in 12 months, earlier if needed and they will call of any problem in the interim Signed By: Melvina Sharpe MD   
 August 27, 2020 This note will not be viewable in [facility]. If you have questions, please do not hesitate to call me. I look forward to following your patient along with you. Sincerely, Melvina Sharpe MD

## 2020-08-27 NOTE — PATIENT INSTRUCTIONS
Office Policies 
 
o Phone calls/patient messages: Please allow up to 24 hours for someone in the office to contact you about your call or message. Be mindful your provider may be out of the office or your message may require further review. We encourage you to use [employer] for your messages as this is a faster, more efficient way to communicate with our office 
 
o Medication Refills: 
Prescription medications require up to 48 business hours to process. We encourage you to use [employer] for your refills. For controlled medications: Please allow up to 72 business hours to process. Certain medications may require you to  a written prescription at our office. NO narcotic/controlled medications will be prescribed after 4pm Monday through Friday or on weekends 
 
o Form/Paperwork Completion: We ask that you allow 7-14 business days. You may also download your forms to [employer] to have your doctor print off.

## 2020-08-28 LAB
ALBUMIN SERPL-MCNC: 4.3 G/DL (ref 3.6–4.6)
ALBUMIN/GLOB SERPL: 2.7 {RATIO} (ref 1.2–2.2)
ALP SERPL-CCNC: 57 IU/L (ref 39–117)
ALT SERPL-CCNC: 13 IU/L (ref 0–44)
AST SERPL-CCNC: 18 IU/L (ref 0–40)
BASOPHILS # BLD AUTO: 0 X10E3/UL (ref 0–0.2)
BASOPHILS NFR BLD AUTO: 1 %
BILIRUB SERPL-MCNC: 0.3 MG/DL (ref 0–1.2)
BUN SERPL-MCNC: 19 MG/DL (ref 8–27)
BUN/CREAT SERPL: 16 (ref 10–24)
CALCIUM SERPL-MCNC: 9.2 MG/DL (ref 8.6–10.2)
CHLORIDE SERPL-SCNC: 103 MMOL/L (ref 96–106)
CO2 SERPL-SCNC: 23 MMOL/L (ref 20–29)
CREAT SERPL-MCNC: 1.18 MG/DL (ref 0.76–1.27)
EOSINOPHIL # BLD AUTO: 0.3 X10E3/UL (ref 0–0.4)
EOSINOPHIL NFR BLD AUTO: 4 %
ERYTHROCYTE [DISTWIDTH] IN BLOOD BY AUTOMATED COUNT: 12.4 % (ref 11.6–15.4)
EST. AVERAGE GLUCOSE BLD GHB EST-MCNC: 128 MG/DL
GLOBULIN SER CALC-MCNC: 1.6 G/DL (ref 1.5–4.5)
GLUCOSE SERPL-MCNC: 68 MG/DL (ref 65–99)
HBA1C MFR BLD: 6.1 % (ref 4.8–5.6)
HCT VFR BLD AUTO: 38.6 % (ref 37.5–51)
HGB BLD-MCNC: 12.5 G/DL (ref 13–17.7)
IMM GRANULOCYTES # BLD AUTO: 0 X10E3/UL (ref 0–0.1)
IMM GRANULOCYTES NFR BLD AUTO: 0 %
LYMPHOCYTES # BLD AUTO: 2.4 X10E3/UL (ref 0.7–3.1)
LYMPHOCYTES NFR BLD AUTO: 33 %
MCH RBC QN AUTO: 31.7 PG (ref 26.6–33)
MCHC RBC AUTO-ENTMCNC: 32.4 G/DL (ref 31.5–35.7)
MCV RBC AUTO: 98 FL (ref 79–97)
MONOCYTES # BLD AUTO: 0.6 X10E3/UL (ref 0.1–0.9)
MONOCYTES NFR BLD AUTO: 9 %
NEUTROPHILS # BLD AUTO: 3.9 X10E3/UL (ref 1.4–7)
NEUTROPHILS NFR BLD AUTO: 53 %
PLATELET # BLD AUTO: 260 X10E3/UL (ref 150–450)
POTASSIUM SERPL-SCNC: 4.7 MMOL/L (ref 3.5–5.2)
PROT SERPL-MCNC: 5.9 G/DL (ref 6–8.5)
RBC # BLD AUTO: 3.94 X10E6/UL (ref 4.14–5.8)
SODIUM SERPL-SCNC: 140 MMOL/L (ref 134–144)
WBC # BLD AUTO: 7.2 X10E3/UL (ref 3.4–10.8)

## 2020-08-28 NOTE — PROGRESS NOTES
Consult    Subjective:     Julian Baker is a 80 y.o. Right-handed  male seen for evaluation at the request of Dr. Jr Morgan, of problem of having more trouble with his hearing, and we had to yell to get ourselves heard by the patient, he is going to get new hearing aids, and he has a problem of bad neck pain and back pain and currently sees Dr. Margarette Lindsey with injections periodically, and they seem to be helping quite a bit. He still has a lot of pain radiating into his right leg associated with some weakness and atrophy of the leg muscles, but is not a surgical candidate according to his orthopedist.  He also has some diabetes and diabetic neuropathy and that still bothering him, and he would like to get blood test done because is not had any in almost a year. We order those for him today and ordered a carotid Doppler to follow-up on his previous vascular disease. We also refilled his gabapentin which seems to be controlling his pain but he has to be careful because of drowsiness when he takes 3 a day but often only takes 2 a day unless the pain is severe. He still has muscle tension type headaches, but they seem to be stable and not progressive. He is trying to avoid clenching his teeth. He also has chronic back pain from his postlaminectomy syndrome and spinal stenosis, but does not want back surgery. He has bilateral L5 motor radiculopathy right greater than left and wears an AFO brace on the right leg because of foot drop. Ange Sharma He has seen his pain management doctor Dr. Margarette Lindsey, and they did do several injections in his neck to help with the pain and they did x-rays in January that shows he does have moderate arthritis at multiple levels. We offered him physical therapy but he said he is used all his physical therapy, and no longer gets it covered, we will give him exercises to do on his after visit summary.   We explained cervicogenic headaches from tight muscles and cervical arthritis, and occipital neuralgia is Please advise.   most likely the combination of pain that he had. He denies any recent fever, meningismus, or trauma to the head or neck, and no new focal weakness or sensory loss in his arms and legs. He is also seen for his diabetic neuropathy, and find that the Cymbalta seemed to help,  30 mg. He has had no recent visual changes or any other focal neurologic deficit, no fever no meningismus, no recent trauma, and no other cause for his symptoms. He had complaints of generalized fatigue and weakness because he can exercise because of his chronic back pain and neck pain and arthritis, but all his work-up for any treatable neuromuscular disease were negative including myasthenia titers, thyroid, B12, and his CPK was normal, and all his connective tissue screen of SHAYNE and sed rate were normal.  He walks very slowly with a marked limp. The last hemoglobin A1c I see was 6.3 done 2 years ago. 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 He takes 1-1/2 50 mg trazodone at nighttime. Patient's EMG study was done in December 2016 and also showed a chronic L5 radiculopathy on the right and left side and the right borderline carpal tunnel syndrome on the right side. He has a post lumbar laminectomy syndrome with chronic pain. Patient was initially seen 2 years ago for evaluation of sudden loss of vision with a left peripheral visual field cut, that resolved spontaneously on its own and MRI scans did not show any clear stroke and he had no intracranial or extracranial significant vascular disease. He is stable on 81 mg of aspirin every day and not had a recurrent stroke like symptoms. His carotid Doppler study showed no significant stenosis 2 years ago, but he has had no recurrent Dopplers in 2 years to we will repeat that next week. . No other retinal cause for his visual loss was found. He has no major cognitive issues, but does have some mild recent memory loss at times, but does not really feel it needs an evaluation, and his wife initially seemed concerned but then says she doesn't think he needs an evaluation either. Patient has no known history of previous stroke, no significant headache except a mild chronic generalized headache ever since the fall, no prior history of visual loss, and no other new focal weakness or sensory loss except for the numbness in his feet. Patient has no jaw claudication or other PMR symptoms.     Past Medical History:   Diagnosis Date    Arthritis     osteoarthritis    Basal cell carcinoma (BCC) of skin of nose     Bladder cancer (HCC)     Diabetes ([facility]     borderline    GERD (gastroesophageal reflux disease)     Hypertension     Insomnia     Prostate cancer ([facility]     Selenium deficiency     Staph infection     Both eyes      Past Surgical History:   Procedure Laterality Date    HX CARPAL TUNNEL RELEASE Left 2012    HX CARPAL TUNNEL RELEASE Right 2015    HX CATARACT REMOVAL Right 2009    HX CATARACT REMOVAL Left 2019    HX ORTHOPAEDIC      cervical spine spacers placed    HX ORTHOPAEDIC  7-2012    lumbar (back surgery)    HX ORTHOPAEDIC Right     wrist-Reconstructed after Carpal Tunnel Release    HX OTHER SURGICAL  10/2011    basal cell CA nose    HX PROSTATECTOMY  1998    due to CA    HX ROTATOR CUFF REPAIR Right 2009    Dr. Aajy Rawls     HX SEPTOPLASTY      HX UROLOGICAL      skin ca inside of my bladder,     Family History   Problem Relation Age of Onset    Other Mother         old age   [facility] Other Father         typhoid fever    Hypertension Brother     Asthma Brother     Cancer Brother         esophageal    Diabetes Brother     Arthritis-osteo Brother       Social History     Tobacco Use    Smoking status: Never Smoker    Smokeless tobacco: Former User    Tobacco comment: chewing tobacco quit years ago   Substance Use Topics    Alcohol use: Yes     Alcohol/week: 2.0 standard drinks     Types: 2 Shots of liquor per week         Current Outpatient Medications:     gabapentin (NEURONTIN) 600 mg tablet, Take 1 Tab by mouth three (3) times daily. Max Daily Amount: 1,800 mg., Disp: 270 Tab, Rfl: 1    DULoxetine (CYMBALTA) 30 mg capsule, TAKE ONE CAPSULE BY MOUTH DAILY, Disp: 90 Cap, Rfl: 4    cholecalciferol (VITAMIN D3) (1000 Units /25 mcg) tablet, Take  by mouth daily. , Disp: , Rfl:     tadalafil (CIALIS) 20 mg tablet, Take 20 mg by mouth as needed for Erectile Dysfunction. , Disp: , Rfl:     potassium 99 mg tablet, Take 99 mg by mouth daily. , Disp: , Rfl:     magnesium 250 mg tab, Take 1 Tab by mouth daily. , Disp: , Rfl:     ferrous fumarate/vit Bcomp,C (SUPER B COMPLEX PO), Take 1 Tab by mouth daily. , Disp: , Rfl:     RESTASIS 0.05 % ophthalmic emulsion, Administer 1 Drop to both eyes every twelve (12) hours. , Disp: , Rfl:     celecoxib (CELEBREX) 200 mg capsule, Take 200 mg by mouth daily. , Disp: , Rfl:     lisinopril (PRINIVIL, ZESTRIL) 10 mg tablet, Take 10 mg by mouth daily. , Disp: , Rfl:     traZODone (DESYREL) 50 mg tablet, Take 100 mg by mouth nightly., Disp: , Rfl:     copper gluconate 2 mg tab tablet, Take 2 mg by mouth daily. , Disp: , Rfl:     FOLIC ACID PO, Take 1 Tab by mouth daily. , Disp: , Rfl:     SALMON OIL/OMEGA-3 FATTY ACIDS (SALMON OIL-1000 PO), Take 1 Tab by mouth daily. , Disp: , Rfl:     SACCHAROMYCES BOULARDII (PROBIOTIC, S.BOULARDII, PO), Take 1 Tab by mouth daily. , Disp: , Rfl:     omeprazole (PRILOSEC) 40 mg capsule, Take 40 mg by mouth daily. , Disp: , Rfl:     pyridoxine (VITAMIN B-6) 200 mg tablet, Take 200 mg by mouth daily. , Disp: , Rfl:     nicotinic acid (NIACIN) 500 mg tablet, Take 500 mg by mouth Daily (before breakfast). , Disp: , Rfl:     GLUCOSAMINE HCL/CHONDR WELDON A NA (GLUCOSAMINE-CHONDROITIN) 750-600 mg Tab, Take 1,500 mg by mouth two (2) times a day.  , Disp: , Rfl:     multivitamin (ONE A DAY) tablet, Take 1 Tab by mouth daily. , Disp: , Rfl:         Allergies   Allergen Reactions    Sulfa (Sulfonamide Antibiotics) Hives        Review of Systems:  A comprehensive review of systems was negative except for: Eyes: positive for visual disturbance  Musculoskeletal: positive for myalgias, arthralgias, stiff joints and back pain  Neurological: positive for headaches, memory problems, paresthesia and gait problems   Vitals:    08/27/20 1049   BP: 124/60   Pulse: 72   Resp: 16   Temp: 98 °F (36.7 °C)   SpO2: 98%   Weight: 150 lb (68 kg)   Height: 5' 7\" (1.702 m)     Objective:     I      NEUROLOGICAL EXAM:    Appearance: The patient is well developed, well nourished, provides a coherent history and is in no acute distress. Mental Status: Oriented to time, place and person, and the president, cognitive function is a bit slow but probably normal and speech is fluent and no aphasia or dysarthria. Mood and affect appropriate. Cranial Nerves:   Intact visual fields. Fundi are benign, discs are flat. MOISES, EOM's full, no nystagmus, no ptosis. Facial sensation is normal. Corneal reflexes are not tested. Facial movement is symmetric. Hearing is abnormal bilaterally. Palate is midline with normal sternocleidomastoid and trapezius muscles are normal. Tongue is midline. Neck without meningismus or bruits  Neck has limited range of motion in all directions, and the muscles are very tight at the craniocervical junction  Patient has no temporal artery tenderness or enlargement   Motor:  5/5 strength in upper and lower proximal and distal muscles except for a foot drop on the right side from his previous back problems, and foot drop on the right. Normal bulk and tone. No fasciculations.   Patient has very stiff lower lumbar spine with muscle spasms present and decreased range of motion  Rapid altering movement is a little slow but symmetric   Reflexes:   Deep tendon reflexes 1+/4 and symmetrical, absent ankle jerks bilaterally. No babinski or clonus present   Sensory:   Abnormal to touch, pinprick and vibration and temperature in both feet to midcalf level. DSS is intact   Gait:  Abnormal gait because of his back pain he moves slowly. Tremor:   No tremor noted. Cerebellar:  Mildly abnormal Romberg and tandem cerebellar signs present, but finger-nose-finger examination shows no major tremor. .   Neurovascular:  Normal heart sounds and regular rhythm, peripheral pulses decreased, and no carotid bruits. Assessment:       ICD-10-CM ICD-9-CM    1. Cerebral infarction involving right carotid artery (HCC)  I63.231 433.11 DUPLEX CAROTID BILATERAL      gabapentin (NEURONTIN) 600 mg tablet      METABOLIC PANEL, COMPREHENSIVE      CBC WITH AUTOMATED DIFF      HEMOGLOBIN A1C WITH EAG   2. Idiopathic small and large fiber sensory neuropathy  G60.8 356.4 DUPLEX CAROTID BILATERAL      gabapentin (NEURONTIN) 600 mg tablet      METABOLIC PANEL, COMPREHENSIVE      CBC WITH AUTOMATED DIFF      HEMOGLOBIN A1C WITH EAG   3. Cervical post-laminectomy syndrome  M96.1 722.81 DUPLEX CAROTID BILATERAL      gabapentin (NEURONTIN) 600 mg tablet      METABOLIC PANEL, COMPREHENSIVE      CBC WITH AUTOMATED DIFF      HEMOGLOBIN A1C WITH EAG   4. Left homonymous hemianopsia  H53.462 368.46 DUPLEX CAROTID BILATERAL      gabapentin (NEURONTIN) 600 mg tablet      METABOLIC PANEL, COMPREHENSIVE      CBC WITH AUTOMATED DIFF      HEMOGLOBIN A1C WITH EAG   5. Lumbar back pain with radiculopathy affecting left lower extremity  M54.16 724.4 DUPLEX CAROTID BILATERAL      gabapentin (NEURONTIN) 600 mg tablet      METABOLIC PANEL, COMPREHENSIVE      CBC WITH AUTOMATED DIFF      HEMOGLOBIN A1C WITH EAG   6. Memory change  R41.3 780.93 DUPLEX CAROTID BILATERAL      gabapentin (NEURONTIN) 600 mg tablet      METABOLIC PANEL, COMPREHENSIVE      CBC WITH AUTOMATED DIFF      HEMOGLOBIN A1C WITH EAG   7.  Cerebral microvascular disease  I67.9 437.9 DUPLEX CAROTID BILATERAL      gabapentin (NEURONTIN) 600 mg tablet      METABOLIC PANEL, COMPREHENSIVE      CBC WITH AUTOMATED DIFF      HEMOGLOBIN A1C WITH EAG   8. Presbycusis of both ears  H91.13 388.01 DUPLEX CAROTID BILATERAL      gabapentin (NEURONTIN) 600 mg tablet      METABOLIC PANEL, COMPREHENSIVE      CBC WITH AUTOMATED DIFF      HEMOGLOBIN A1C WITH EAG   9. Diabetic peripheral neuropathy associated with type 2 diabetes mellitus (HCC)  E11.42 250.60 DUPLEX CAROTID BILATERAL     357.2 gabapentin (NEURONTIN) 600 mg tablet      METABOLIC PANEL, COMPREHENSIVE      CBC WITH AUTOMATED DIFF      HEMOGLOBIN A1C WITH EAG   10. Lumbar post-laminectomy syndrome  M96.1 722.83 DUPLEX CAROTID BILATERAL      gabapentin (NEURONTIN) 600 mg tablet      METABOLIC PANEL, COMPREHENSIVE      CBC WITH AUTOMATED DIFF      HEMOGLOBIN A1C WITH EAG   11. Bilateral carotid artery stenosis  I65.23 433.10 DUPLEX CAROTID BILATERAL     433.30 gabapentin (NEURONTIN) 600 mg tablet      METABOLIC PANEL, COMPREHENSIVE      CBC WITH AUTOMATED DIFF      HEMOGLOBIN A1C WITH EAG         Plan:     Patient with new problem of needing labs to check his glucose and to reassess his medication for his diabetic neuropathy, and his labs were sent off at his request which is not had any blood work in a year. Mandie Veliz He is able to control most of his pain with the Cymbalta 30 mg once a day at bedtime and the gabapentin 600 mg 2-3 times a day and these medications were all refilled for the patient. Patient doing better with headaches, and he stays stable with pain management with Dr. Ana Yao and his PCP is doing all his medications.   Patient also off sleeping pills, and is sleeping better just on the trazodone after we advised him of the increased risk of death with sleeping pills  He also has a chronic bilateral L5 radiculopathy with dorsiflexor weakness right side side greater with new AFO brace which helps him walk on the right  He is also to continue multivitamins and vitamin D on a regular basis remained between physically active  Patient had a complicated history of some visual loss on the left visual field after head injury, that seems better already, an MRI scan shows no clear stroke as a cause  Carotid Dopplers showed no significant stenosis at last visit 2 years ago and will be repeated next week  Sedimentation rate to rule out temporal arteritis was normal  He is encouraged to take a baby aspirin every day, multivitamin every day, and vitamin D every day. He has remained mentally and physically active  35 minutes spent with the patient, reviewing his records on the computer, reviewing his labs, reviewing his EMG and labs computer system today, and deciding he most likely has a diabetic neuropathy. He may have some mild cognitive impairment but we will defer workup for that at this time as requested by the patient   Followup in 12 months, earlier if needed and they will call of any problem in the interim    Signed By: Kelli Combs MD     August 27, 2020       This note will not be viewable in [address]

## 2020-09-10 DIAGNOSIS — M96.1 LUMBAR POST-LAMINECTOMY SYNDROME: ICD-10-CM

## 2020-09-10 DIAGNOSIS — E11.42 DIABETIC PERIPHERAL NEUROPATHY ASSOCIATED WITH TYPE 2 DIABETES MELLITUS (HCC): ICD-10-CM

## 2020-09-10 DIAGNOSIS — I63.231 CEREBRAL INFARCTION INVOLVING RIGHT CAROTID ARTERY (HCC): ICD-10-CM

## 2020-09-10 DIAGNOSIS — M54.16 LUMBAR BACK PAIN WITH RADICULOPATHY AFFECTING LEFT LOWER EXTREMITY: ICD-10-CM

## 2020-09-10 DIAGNOSIS — I65.23 BILATERAL CAROTID ARTERY STENOSIS: ICD-10-CM

## 2020-09-10 DIAGNOSIS — H53.462 LEFT HOMONYMOUS HEMIANOPSIA: ICD-10-CM

## 2020-09-10 DIAGNOSIS — I67.89 CEREBRAL MICROVASCULAR DISEASE: ICD-10-CM

## 2020-09-10 DIAGNOSIS — G60.8 IDIOPATHIC SMALL AND LARGE FIBER SENSORY NEUROPATHY: ICD-10-CM

## 2020-09-10 DIAGNOSIS — H91.13 PRESBYCUSIS OF BOTH EARS: ICD-10-CM

## 2020-09-10 DIAGNOSIS — M96.1 CERVICAL POST-LAMINECTOMY SYNDROME: ICD-10-CM

## 2020-09-10 DIAGNOSIS — R41.3 MEMORY CHANGE: ICD-10-CM

## 2020-09-12 ENCOUNTER — HOSPITAL ENCOUNTER (OUTPATIENT)
Dept: MRI IMAGING | Age: 85
Discharge: HOME | End: 2020-09-12
Attending: NURSE PRACTITIONER
Payer: MEDICARE

## 2020-09-12 DIAGNOSIS — M46.1 SACROILIITIS, NOT ELSEWHERE CLASSIFIED (HCC): ICD-10-CM

## 2020-09-12 DIAGNOSIS — M51.36 DDD (DEGENERATIVE DISC DISEASE), LUMBAR: ICD-10-CM

## 2020-09-12 DIAGNOSIS — M47.816 SPONDYLOSIS OF LUMBAR REGION WITHOUT MYELOPATHY OR RADICULOPATHY: ICD-10-CM

## 2020-09-12 DIAGNOSIS — R29.898 RIGHT LEG WEAKNESS: ICD-10-CM

## 2020-09-12 PROCEDURE — 72148 MRI LUMBAR SPINE W/O DYE: CPT

## 2020-09-24 PROCEDURE — 93880 EXTRACRANIAL BILAT STUDY: CPT | Performed by: PSYCHIATRY & NEUROLOGY

## 2020-10-23 ENCOUNTER — HOSPITAL ENCOUNTER (OUTPATIENT)
Dept: ULTRASOUND IMAGING | Age: 85
Discharge: HOME | End: 2020-10-23
Attending: NURSE PRACTITIONER
Payer: MEDICARE

## 2020-10-23 ENCOUNTER — TRANSCRIBE ORDER (OUTPATIENT)
Dept: SCHEDULING | Age: 85
End: 2020-10-23

## 2020-10-23 DIAGNOSIS — R19.09 GROIN SWELLING: Primary | ICD-10-CM

## 2020-10-23 DIAGNOSIS — R19.09 GROIN SWELLING: ICD-10-CM

## 2020-10-23 PROCEDURE — 76882 US LMTD JT/FCL EVL NVASC XTR: CPT

## 2020-11-04 ENCOUNTER — OFFICE VISIT (OUTPATIENT)
Dept: SURGERY | Age: 85
End: 2020-11-04
Payer: MEDICARE

## 2020-11-04 VITALS
HEIGHT: 67 IN | BODY MASS INDEX: 24.34 KG/M2 | RESPIRATION RATE: 16 BRPM | WEIGHT: 155.1 LBS | DIASTOLIC BLOOD PRESSURE: 70 MMHG | SYSTOLIC BLOOD PRESSURE: 133 MMHG | TEMPERATURE: 97.4 F | HEART RATE: 70 BPM | OXYGEN SATURATION: 94 %

## 2020-11-04 DIAGNOSIS — K40.90 LEFT INGUINAL HERNIA: ICD-10-CM

## 2020-11-04 DIAGNOSIS — K40.91 RECURRENT RIGHT INGUINAL HERNIA: Primary | ICD-10-CM

## 2020-11-04 PROCEDURE — G8432 DEP SCR NOT DOC, RNG: HCPCS | Performed by: SURGERY

## 2020-11-04 PROCEDURE — G8536 NO DOC ELDER MAL SCRN: HCPCS | Performed by: SURGERY

## 2020-11-04 PROCEDURE — 1101F PT FALLS ASSESS-DOCD LE1/YR: CPT | Performed by: SURGERY

## 2020-11-04 PROCEDURE — 99204 OFFICE O/P NEW MOD 45 MIN: CPT | Performed by: SURGERY

## 2020-11-04 PROCEDURE — G8427 DOCREV CUR MEDS BY ELIG CLIN: HCPCS | Performed by: SURGERY

## 2020-11-04 PROCEDURE — G8420 CALC BMI NORM PARAMETERS: HCPCS | Performed by: SURGERY

## 2020-11-04 NOTE — LETTER
11/4/20 Patient: Denisha Seen YOB: 1935 Date of Visit: 11/4/2020 Anel Zafar MD 
[address] [place] [address] VIA Facsimile: [phone] Heriberto Rios NP 
[address] [place] [address] [place] [id number] VIA Facsimile: [phone] Dear MD Heriberto Gomez, NP, Thank you [facility]. My notes for this consultation are attached. If you have questions, please do not hesitate to call me. I look forward to following your patient along with you.  
 
 
Sincerely, 
 
Domi Richard MD

## 2020-11-04 NOTE — PROGRESS NOTES
Room: 2    Identified pt with two pt identifiers(name and ). Reviewed record in preparation for visit and have obtained necessary documentation. All patient medications has been reviewed. Chief Complaint   Patient presents with    New Patient     Pt is seen here today for the evaluation of hernia, pt referred by SON Gallardo          There were no vitals filed for this visit. 4.Have you been to the ER, urgent care clinic since your last visit? Hospitalized since your last visit? No    5. Have you seen or consulted any other health care providers outside of the [address] [place] [address] since your last visit? Include any pap smears or colon screening. No      Patient is accompanied by self I have received verbal consent from Clayton Khan to discuss any/all medical information while they are present in the room.

## 2020-11-04 NOTE — PATIENT INSTRUCTIONS

## 2020-11-04 NOTE — H&P (VIEW-ONLY)
HISTORY OF PRESENT ILLNESS Jelena Ureña is a 80 y.o. male who comes in for consultation by Naeem Chavez MD for a hernia HPI He has noted increasing left groin discomfort for several months. It is more bothersome when stretching or bending. He denies associated nausea, vomiting, diarrhea, constipation, melena, hematochezia or dysuria. He previously had a RIH and an open radical prostatectomy for malignancy. Past Medical History:  
Diagnosis Date  Arthritis   
 osteoarthritis  Basal cell carcinoma (BCC) of skin of nose  Bladder cancer ([facility] [address]  Diabetes ([facility] [address] borderline  GERD (gastroesophageal reflux disease)  Hypertension  Insomnia  Prostate cancer ([facility] [address]  Selenium deficiency  Staph infection Both eyes Past Surgical History:  
Procedure Laterality Date  HX CARPAL TUNNEL RELEASE Left 2012  HX CARPAL TUNNEL RELEASE Right 2015  HX CATARACT REMOVAL Right 2009  HX CATARACT REMOVAL Left 2019  HX ORTHOPAEDIC    
 cervical spine spacers placed  HX ORTHOPAEDIC  [id number]  
 lumbar (back surgery)  HX ORTHOPAEDIC Right   
 wrist-Reconstructed after Carpal Tunnel Release  HX OTHER SURGICAL  10/2011  
 basal cell CA nose  HX PROSTATECTOMY  1998  
 due to [address] [place] [address] Right 2009 Dr. Edilia Castleman  HX SEPTOPLASTY  HX UROLOGICAL    
 skin ca inside of my bladder,  
 
Family History Problem Relation Age of Onset [employer] Other Mother   
     old age  Other Father   
     typhoid fever  Hypertension Brother  Asthma Brother  Cancer Brother   
     esophageal  
 Diabetes Brother  Arthritis-osteo Brother Social History Tobacco Use  Smoking status: Never Smoker  Smokeless tobacco: Former User  Tobacco comment: chewing tobacco quit years ago Substance Use Topics  Alcohol use: Yes Alcohol/week: 2.0 standard drinks Types: 2 Shots of liquor per week  Drug use:  No  
 
 Current Outpatient Medications Medication Sig  
 gabapentin (NEURONTIN) 600 mg tablet Take 1 Tab by mouth three (3) times daily. Max Daily Amount: 1,800 mg.  
 DULoxetine (CYMBALTA) 30 mg capsule TAKE ONE CAPSULE BY MOUTH DAILY  cholecalciferol (VITAMIN D3) (1000 Units /25 mcg) tablet Take  by mouth daily.  tadalafil (CIALIS) 20 mg tablet Take 20 mg by mouth as needed for Erectile Dysfunction.  potassium 99 mg tablet Take 99 mg by mouth nightly.  magnesium 250 mg tab Take 1 Tab by mouth daily.  ferrous fumarate/vit Bcomp,C (SUPER B COMPLEX PO) Take 1 Tab by mouth daily.  RESTASIS 0.05 % ophthalmic emulsion Administer 1 Drop to both eyes every twelve (12) hours.  celecoxib (CELEBREX) 200 mg capsule Take 200 mg by mouth daily.  lisinopril (PRINIVIL, ZESTRIL) 10 mg tablet Take 10 mg by mouth nightly.  traZODone (DESYREL) 50 mg tablet Take 100 mg by mouth nightly.  copper gluconate 2 mg tab tablet Take 2 mg by mouth daily.  FOLIC ACID PO Take 1 Tab by mouth daily.  SALMON OIL/OMEGA-3 FATTY ACIDS (SALMON OIL-1000 PO) Take 1 Tab by mouth daily.  SACCHAROMYCES BOULARDII (PROBIOTIC, S.BOULARDII, PO) Take 1 Tab by mouth daily.  omeprazole (PRILOSEC) 40 mg capsule Take 40 mg by mouth daily.  pyridoxine (VITAMIN B-6) 200 mg tablet Take 200 mg by mouth daily.  nicotinic acid (NIACIN) 500 mg tablet Take 500 mg by mouth nightly.  GLUCOSAMINE HCL/CHONDR WELDON A NA (GLUCOSAMINE-CHONDROITIN) 750-600 mg Tab Take 1,500 mg by mouth two (2) times a day.  multivitamin (ONE A DAY) tablet Take 1 Tab by mouth daily. No current facility-administered medications for this visit. Allergies Allergen Reactions  Sulfa (Sulfonamide Antibiotics) Hives Review of Systems Constitutional: Negative for chills, diaphoresis, fever, malaise/fatigue and weight loss. HENT: Negative for congestion, ear pain and sore throat. Eyes: Negative for blurred vision and pain. Respiratory: Negative for cough, hemoptysis, sputum production, shortness of breath, wheezing and stridor. Cardiovascular: Negative for chest pain, palpitations, orthopnea, claudication, leg swelling and PND. Gastrointestinal: Positive for abdominal pain. Negative for blood in stool, constipation, diarrhea, heartburn, melena, nausea and vomiting. Genitourinary: Negative for dysuria, flank pain, frequency, hematuria and urgency. Musculoskeletal: Positive for back pain. Negative for joint pain, myalgias and neck pain. Skin: Negative for itching and rash. Neurological: Negative for dizziness, tremors, focal weakness, seizures, weakness and headaches. Endo/Heme/Allergies: Negative for polydipsia. Psychiatric/Behavioral: Negative for depression and memory loss. The patient is not nervous/anxious. Visit Vitals /70 (BP 1 Location: Left arm, BP Patient Position: Sitting) Pulse 70 Temp 97.4 °F (36.3 °C) Resp 16 Ht 5' 7\" (1.702 m) Wt 70.4 kg (155 lb 1.6 oz) SpO2 94% BMI 24.29 kg/m² Physical Exam 
Constitutional:   
   General: He is not in acute distress. Appearance: Normal appearance. He is well-developed. He is not diaphoretic. HENT:  
   Head: Normocephalic and atraumatic. Mouth/Throat:  
   Pharynx: No oropharyngeal exudate. Eyes:  
   General: No scleral icterus. Conjunctiva/sclera: Conjunctivae normal.  
   Pupils: Pupils are equal, round, and reactive to light. Neck: Musculoskeletal: Normal range of motion and neck supple. Thyroid: No thyromegaly. Trachea: No tracheal deviation. Cardiovascular:  
   Rate and Rhythm: Normal rate and regular rhythm. Heart sounds: Normal heart sounds. No murmur. No friction rub. No gallop. Pulmonary:  
   Effort: Pulmonary effort is normal. No respiratory distress. Breath sounds: Normal breath sounds. No stridor. No wheezing or rales. Abdominal: General: Bowel sounds are normal. There is no distension. Palpations: Abdomen is soft. There is no mass. Tenderness: There is no abdominal tenderness. There is no guarding or rebound. Hernia: A hernia is present. Hernia is present in the left inguinal area and right inguinal area. There is no hernia in the umbilical area or ventral area. Genitourinary: 
   Penis: Circumcised. Scrotum/Testes: Normal. Cremasteric reflex is present. Musculoskeletal: Normal range of motion. General: No tenderness. Lymphadenopathy:  
   Cervical: No cervical adenopathy. Skin: 
   General: Skin is warm and dry. Findings: No erythema or rash. Neurological:  
   Mental Status: He is alert and oriented to person, place, and time. Cranial Nerves: No cranial nerve deficit. Coordination: Coordination normal.  
Psychiatric:     
   Behavior: Behavior normal.     
   Thought Content: Thought content normal.     
   Judgment: Judgment normal.  
 
 
 
ASSESSMENT and PLAN 1. Left and recurrent right inguinal herniae. I explained about the anatomy and pathophysiology of hernias and the risk of incarceration and strangulation of the bowel. I explained about hernia repairs (open with and without mesh, and robotic assisted and laparoscopic with mesh). I explained the risks and benefits of repair including bleeding, infection, chronic pain, orchalgia, loss of testes, bowel or bladder injury, hernia recurrence, seroma, mesh infection requiring removal.  I explained it would be a six to eight week recuperation with no driving for 5 - 7 days, no lifting for six weeks. 2.  Hx prostate ca  MARY 3. NIDDM type 2. Diet controlled He wishes to proceed with a robotic assisted laparoscopic left and recurrent right inguinal hernia repair with mesh under general anesthesia as an outpatient Dre Alvarenga MD FACS

## 2020-11-04 NOTE — PROGRESS NOTES
HISTORY OF PRESENT ILLNESS  Jodee Shaw is a 80 y.o. male who comes in for consultation by Darline Dumont MD for a hernia  HPI  He has noted increasing left groin discomfort for several months. It is more bothersome when stretching or bending. He denies associated nausea, vomiting, diarrhea, constipation, melena, hematochezia or dysuria. He previously had a RIH and an open radical prostatectomy for malignancy. Past Medical History:   Diagnosis Date    Arthritis     osteoarthritis    Basal cell carcinoma (BCC) of skin of nose     Bladder cancer (HCC)     Diabetes ([address]     borderline    GERD (gastroesophageal reflux disease)     Hypertension     Insomnia     Prostate cancer ([address]     Selenium deficiency     Staph infection     Both eyes     Past Surgical History:   Procedure Laterality Date    HX CARPAL TUNNEL RELEASE Left 2012    HX CARPAL TUNNEL RELEASE Right 2015    HX CATARACT REMOVAL Right 2009    HX CATARACT REMOVAL Left 2019    HX ORTHOPAEDIC      cervical spine spacers placed    HX ORTHOPAEDIC  7-2012    lumbar (back surgery)    HX ORTHOPAEDIC Right     wrist-Reconstructed after Carpal Tunnel Release    HX OTHER SURGICAL  10/2011    basal cell CA nose    HX PROSTATECTOMY  1998    due to CA    HX ROTATOR CUFF REPAIR Right 2009    Dr. Radha Morton     HX SEPTOPLASTY      HX UROLOGICAL      skin ca inside of my bladder,     Family History   Problem Relation Age of Onset   Eyvonne Clonts Other Mother         old age   Eyvonne Clonts Other Father         typhoid fever    Hypertension Brother     Asthma Brother     Cancer Brother         esophageal    Diabetes Brother     Arthritis-osteo Brother      Social History     Tobacco Use    Smoking status: Never Smoker    Smokeless tobacco: Former User    Tobacco comment: chewing tobacco quit years ago   Substance Use Topics    Alcohol use:  Yes     Alcohol/week: 2.0 standard drinks     Types: 2 Shots of liquor per week    Drug use: No     Current Outpatient Medications   Medication Sig    gabapentin (NEURONTIN) 600 mg tablet Take 1 Tab by mouth three (3) times daily. Max Daily Amount: 1,800 mg.    DULoxetine (CYMBALTA) 30 mg capsule TAKE ONE CAPSULE BY MOUTH DAILY    cholecalciferol (VITAMIN D3) (1000 Units /25 mcg) tablet Take  by mouth daily.  tadalafil (CIALIS) 20 mg tablet Take 20 mg by mouth as needed for Erectile Dysfunction.  potassium 99 mg tablet Take 99 mg by mouth nightly.  magnesium 250 mg tab Take 1 Tab by mouth daily.  ferrous fumarate/vit Bcomp,C (SUPER B COMPLEX PO) Take 1 Tab by mouth daily.  RESTASIS 0.05 % ophthalmic emulsion Administer 1 Drop to both eyes every twelve (12) hours.  celecoxib (CELEBREX) 200 mg capsule Take 200 mg by mouth daily.  lisinopril (PRINIVIL, ZESTRIL) 10 mg tablet Take 10 mg by mouth nightly.  traZODone (DESYREL) 50 mg tablet Take 100 mg by mouth nightly.  copper gluconate 2 mg tab tablet Take 2 mg by mouth daily.  FOLIC ACID PO Take 1 Tab by mouth daily.  SALMON OIL/OMEGA-3 FATTY ACIDS (SALMON OIL-1000 PO) Take 1 Tab by mouth daily.  SACCHAROMYCES BOULARDII (PROBIOTIC, S.BOULARDII, PO) Take 1 Tab by mouth daily.  omeprazole (PRILOSEC) 40 mg capsule Take 40 mg by mouth daily.  pyridoxine (VITAMIN B-6) 200 mg tablet Take 200 mg by mouth daily.  nicotinic acid (NIACIN) 500 mg tablet Take 500 mg by mouth nightly.  GLUCOSAMINE HCL/CHONDR WELDON A NA (GLUCOSAMINE-CHONDROITIN) 750-600 mg Tab Take 1,500 mg by mouth two (2) times a day.  multivitamin (ONE A DAY) tablet Take 1 Tab by mouth daily. No current facility-administered medications for this visit. Allergies   Allergen Reactions    Sulfa (Sulfonamide Antibiotics) Hives       Review of Systems   Constitutional: Negative for chills, diaphoresis, fever, malaise/fatigue and weight loss. HENT: Negative for congestion, ear pain and sore throat. Eyes: Negative for blurred vision and pain.    Respiratory: Negative for cough, hemoptysis, sputum production, shortness of breath, wheezing and stridor. Cardiovascular: Negative for chest pain, palpitations, orthopnea, claudication, leg swelling and PND. Gastrointestinal: Positive for abdominal pain. Negative for blood in stool, constipation, diarrhea, heartburn, melena, nausea and vomiting. Genitourinary: Negative for dysuria, flank pain, frequency, hematuria and urgency. Musculoskeletal: Positive for back pain. Negative for joint pain, myalgias and neck pain. Skin: Negative for itching and rash. Neurological: Negative for dizziness, tremors, focal weakness, seizures, weakness and headaches. Endo/Heme/Allergies: Negative for polydipsia. Psychiatric/Behavioral: Negative for depression and memory loss. The patient is not nervous/anxious. Visit Vitals  /70 (BP 1 Location: Left arm, BP Patient Position: Sitting)   Pulse 70   Temp 97.4 °F (36.3 °C)   Resp 16   Ht 5' 7\" (1.702 m)   Wt 70.4 kg (155 lb 1.6 oz)   SpO2 94%   BMI 24.29 kg/m²       Physical Exam  Constitutional:       General: He is not in acute distress. Appearance: Normal appearance. He is well-developed. He is not diaphoretic. HENT:      Head: Normocephalic and atraumatic. Mouth/Throat:      Pharynx: No oropharyngeal exudate. Eyes:      General: No scleral icterus. Conjunctiva/sclera: Conjunctivae normal.      Pupils: Pupils are equal, round, and reactive to light. Neck:      Musculoskeletal: Normal range of motion and neck supple. Thyroid: No thyromegaly. Trachea: No tracheal deviation. Cardiovascular:      Rate and Rhythm: Normal rate and regular rhythm. Heart sounds: Normal heart sounds. No murmur. No friction rub. No gallop. Pulmonary:      Effort: Pulmonary effort is normal. No respiratory distress. Breath sounds: Normal breath sounds. No stridor. No wheezing or rales. Abdominal:      General: Bowel sounds are normal. There is no distension. Palpations: Abdomen is soft. There is no mass. Tenderness: There is no abdominal tenderness. There is no guarding or rebound. Hernia: A hernia is present. Hernia is present in the left inguinal area and right inguinal area. There is no hernia in the umbilical area or ventral area. Genitourinary:     Penis: Circumcised. Scrotum/Testes: Normal. Cremasteric reflex is present. Musculoskeletal: Normal range of motion. General: No tenderness. Lymphadenopathy:      Cervical: No cervical adenopathy. Skin:     General: Skin is warm and dry. Findings: No erythema or rash. Neurological:      Mental Status: He is alert and oriented to person, place, and time. Cranial Nerves: No cranial nerve deficit. Coordination: Coordination normal.   Psychiatric:         Behavior: Behavior normal.         Thought Content: Thought content normal.         Judgment: Judgment normal.         ASSESSMENT and PLAN  1. Left and recurrent right inguinal herniae. I explained about the anatomy and pathophysiology of hernias and the risk of incarceration and strangulation of the bowel. I explained about hernia repairs (open with and without mesh, and robotic assisted and laparoscopic with mesh). I explained the risks and benefits of repair including bleeding, infection, chronic pain, orchalgia, loss of testes, bowel or bladder injury, hernia recurrence, seroma, mesh infection requiring removal.  I explained it would be a six to eight week recuperation with no driving for 5 - 7 days, no lifting for six weeks. 2.  Hx prostate ca  MARY  3. NIDDM type 2.   Diet controlled          He wishes to proceed with a robotic assisted laparoscopic left and recurrent right inguinal hernia repair with mesh under general anesthesia as an outpatient      Nicolasa Romero MD FACS

## 2020-11-05 DIAGNOSIS — Z01.818 PRE-OP TESTING: Primary | ICD-10-CM

## 2020-11-06 DIAGNOSIS — Z01.818 PRE-OP TESTING: ICD-10-CM

## 2020-11-11 ENCOUNTER — TELEPHONE (OUTPATIENT)
Dept: SURGERY | Age: 85
End: 2020-11-11

## 2020-11-11 RX ORDER — LANOLIN ALCOHOL/MO/W.PET/CERES
1000 CREAM (GRAM) TOPICAL DAILY
COMMUNITY

## 2020-11-11 NOTE — PERIOP NOTES
[facility]  Preoperative Instructions        Surgery Date 11/19/20          Time of Arrival 0645 am Contact # [phone]  Covid Test Scheduled for Kishore 7/15/20- come between 7 am and 10 am     1. On the day of your surgery, please report to the Surgical Services Registration Desk and sign in at your designated time. The Surgery Center is located to the right of the Emergency Room. 2. You must have someone with you to drive you home. You should not drive a car for 24 hours following surgery. Please make arrangements for a friend or family member to stay with you for the first 24 hours after your surgery. 3. Do not have anything to eat or drink (including water, gum, mints, coffee, juice) after midnight ?? .? This may not apply to medications prescribed by your physician. ?(Please note below the special instructions with medications to take the morning of your procedure.)    4. We recommend you do not drink any alcoholic beverages for 24 hours before and after your surgery. 5. Contact your surgeons office for instructions on the following medications: non-steroidal anti-inflammatory drugs (i.e. Advil, Aleve), vitamins, and supplements. (Some surgeons will want you to stop these medications prior to surgery and others may allow you to take them)  **If you are currently taking Plavix, Coumadin, Aspirin and/or other blood-thinning agents, contact your surgeon for instructions. ** Your surgeon will partner with the physician prescribing these medications to determine if it is safe to stop or if you need to continue taking. Please do not stop taking these medications without instructions from your surgeon    6. Wear comfortable clothes. Wear glasses instead of contacts. Do not bring any money or jewelry. Please bring picture ID, insurance card, and any prearranged co-payment or hospital payment. Do not wear make-up, particularly mascara the morning of your surgery.   Do not wear nail polish, particularly if you are having foot /hand surgery. Wear your hair loose or down, no ponytails, buns, lupillo pins or clips. All body piercings must be removed. Please shower with antibacterial soap for three consecutive days before and on the morning of surgery, but do not apply any lotions, powders or deodorants after the shower on the day of surgery. Please use a fresh towels after each shower. Please sleep in clean clothes and change bed linens the night before surgery. Please do not shave for 48 hours prior to surgery. Shaving of the face is acceptable. 7. You should understand that if you do not follow these instructions your surgery may be cancelled. If your physical condition changes (I.e. fever, cold or flu) please contact your surgeon as soon as possible. 8. It is important that you be on time. If a situation occurs where you may be late, please call [phone] (OR Holding Area). 9. If you have any questions and or problems, please call [phone] (Pre-admission Testing). 10. Your surgery time may be subject to change. You will receive a phone call the evening prior if your time changes. 11.  If having outpatient surgery, you must have someone to drive you here, stay with you during the duration of your stay, and to drive you home at time of discharge. Special Instructions:     TAKE ALL MEDICATIONS DAY OF SURGERY EXCEPT:no vitamins or supplements       I understand a pre-operative phone call will be made to verify my surgery time. In the event that I am not available, I give permission for a message to be left on my answering service and/or with another person?   yes         ___________________      __________   _________    (Signature of Patient)             (Witness)                (Date and Time)

## 2020-11-12 NOTE — TELEPHONE ENCOUNTER
Patient calling wanting to know when he should get EKG done for surgery on Thursday Nov 19th. Informed patient to have it done no later than Monday Nov 16th. Asked patient if he had questions in regards to his medications, and he did not.

## 2020-11-13 ENCOUNTER — HOSPITAL ENCOUNTER (OUTPATIENT)
Dept: NON INVASIVE DIAGNOSTICS | Age: 85
Discharge: HOME | End: 2020-11-13
Payer: MEDICARE

## 2020-11-13 PROCEDURE — 93005 ELECTROCARDIOGRAM TRACING: CPT

## 2020-11-15 ENCOUNTER — HOSPITAL ENCOUNTER (OUTPATIENT)
Dept: PREADMISSION TESTING | Age: 85
Discharge: HOME | End: 2020-11-15

## 2020-11-16 ENCOUNTER — HOSPITAL ENCOUNTER (OUTPATIENT)
Dept: PREADMISSION TESTING | Age: 85
Discharge: HOME | End: 2020-11-16
Payer: MEDICARE

## 2020-11-16 ENCOUNTER — TELEPHONE (OUTPATIENT)
Dept: SURGERY | Age: 85
End: 2020-11-16

## 2020-11-16 LAB
ATRIAL RATE: 56 BPM
CALCULATED P AXIS, ECG09: 2 DEGREES
CALCULATED R AXIS, ECG10: 53 DEGREES
CALCULATED T AXIS, ECG11: 31 DEGREES
DIAGNOSIS, 93000: NORMAL
P-R INTERVAL, ECG05: 202 MS
Q-T INTERVAL, ECG07: 382 MS
QRS DURATION, ECG06: 102 MS
QTC CALCULATION (BEZET), ECG08: 368 MS
VENTRICULAR RATE, ECG03: 56 BPM

## 2020-11-16 PROCEDURE — 87635 SARS-COV-2 COVID-19 AMP PRB: CPT

## 2020-11-16 NOTE — TELEPHONE ENCOUNTER
Called patient and he spoke with the hospital. They informed him that they would not accept the rapid test. He went today and got a COVID test at the hospital.

## 2020-11-16 NOTE — TELEPHONE ENCOUNTER
Pt missed his COVID test yesterday. He has an appointment at [facility] for the rapid test tomorrow at 6pm. Will know the results with in minutes. Is this ok?

## 2020-11-17 LAB
HEALTH STATUS, XMCV2T: NORMAL
SARS-COV-2, COV2NT: NOT DETECTED
SOURCE, COVRS: NORMAL
SPECIMEN SOURCE, FCOV2M: NORMAL
SPECIMEN TYPE, XMCV1T: NORMAL

## 2020-11-18 ENCOUNTER — ANESTHESIA EVENT (OUTPATIENT)
Dept: SURGERY | Age: 85
End: 2020-11-18
Payer: MEDICARE

## 2020-11-19 ENCOUNTER — ANESTHESIA (OUTPATIENT)
Dept: SURGERY | Age: 85
End: 2020-11-19
Payer: MEDICARE

## 2020-11-19 ENCOUNTER — HOSPITAL ENCOUNTER (OUTPATIENT)
Age: 85
Setting detail: OUTPATIENT SURGERY
Discharge: HOME | End: 2020-11-19
Attending: SURGERY | Admitting: SURGERY
Payer: MEDICARE

## 2020-11-19 VITALS
TEMPERATURE: 98 F | OXYGEN SATURATION: 93 % | HEART RATE: 77 BPM | SYSTOLIC BLOOD PRESSURE: 112 MMHG | BODY MASS INDEX: 24.08 KG/M2 | DIASTOLIC BLOOD PRESSURE: 86 MMHG | WEIGHT: 153.44 LBS | HEIGHT: 67 IN | RESPIRATION RATE: 20 BRPM

## 2020-11-19 DIAGNOSIS — K40.90 LEFT INGUINAL HERNIA: ICD-10-CM

## 2020-11-19 DIAGNOSIS — K40.91 RECURRENT RIGHT INGUINAL HERNIA: Primary | ICD-10-CM

## 2020-11-19 LAB
GLUCOSE BLD STRIP.AUTO-MCNC: 108 MG/DL (ref 65–100)
GLUCOSE BLD STRIP.AUTO-MCNC: 189 MG/DL (ref 65–100)
SERVICE CMNT-IMP: ABNORMAL
SERVICE CMNT-IMP: ABNORMAL

## 2020-11-19 PROCEDURE — 77030026438 HC STYL ET INTUB CARD -A: Performed by: NURSE ANESTHETIST, CERTIFIED REGISTERED

## 2020-11-19 PROCEDURE — 74011000250 HC RX REV CODE- 250: Performed by: NURSE ANESTHETIST, CERTIFIED REGISTERED

## 2020-11-19 PROCEDURE — 74011250636 HC RX REV CODE- 250/636: Performed by: ANESTHESIOLOGY

## 2020-11-19 PROCEDURE — 74011000250 HC RX REV CODE- 250: Performed by: SURGERY

## 2020-11-19 PROCEDURE — 76060000035 HC ANESTHESIA 2 TO 2.5 HR: Performed by: SURGERY

## 2020-11-19 PROCEDURE — 76010000876 HC OR TIME 2 TO 2.5HR INTENSV - TIER 2: Performed by: SURGERY

## 2020-11-19 PROCEDURE — 74011250636 HC RX REV CODE- 250/636: Performed by: SURGERY

## 2020-11-19 PROCEDURE — 49650 LAP ING HERNIA REPAIR INIT: CPT | Performed by: SURGERY

## 2020-11-19 PROCEDURE — 2709999900 HC NON-CHARGEABLE SUPPLY: Performed by: SURGERY

## 2020-11-19 PROCEDURE — 77030008771 HC TU NG SALEM SUMP -A: Performed by: NURSE ANESTHETIST, CERTIFIED REGISTERED

## 2020-11-19 PROCEDURE — 74011250637 HC RX REV CODE- 250/637: Performed by: SURGERY

## 2020-11-19 PROCEDURE — 77030020061 HC IV BLD WRMR ADMIN SET 3M -B: Performed by: NURSE ANESTHETIST, CERTIFIED REGISTERED

## 2020-11-19 PROCEDURE — 2709999900 HC NON-CHARGEABLE SUPPLY

## 2020-11-19 PROCEDURE — 77030022704 HC SUT VLOC COVD -B: Performed by: SURGERY

## 2020-11-19 PROCEDURE — 76210000020 HC REC RM PH II FIRST 0.5 HR: Performed by: SURGERY

## 2020-11-19 PROCEDURE — 77030008684 HC TU ET CUF COVD -B: Performed by: NURSE ANESTHETIST, CERTIFIED REGISTERED

## 2020-11-19 PROCEDURE — 77030040361 HC SLV COMPR DVT MDII -B: Performed by: SURGERY

## 2020-11-19 PROCEDURE — 74011250636 HC RX REV CODE- 250/636

## 2020-11-19 PROCEDURE — 49651 LAP ING HERNIA REPAIR RECUR: CPT | Performed by: SURGERY

## 2020-11-19 PROCEDURE — 82962 GLUCOSE BLOOD TEST: CPT

## 2020-11-19 PROCEDURE — 76210000016 HC OR PH I REC 1 TO 1.5 HR: Performed by: SURGERY

## 2020-11-19 PROCEDURE — 74011250636 HC RX REV CODE- 250/636: Performed by: NURSE ANESTHETIST, CERTIFIED REGISTERED

## 2020-11-19 PROCEDURE — 77030010507 HC ADH SKN DERMBND J&J -B: Performed by: SURGERY

## 2020-11-19 PROCEDURE — 77030020269 HC MISC IMPL: Performed by: SURGERY

## 2020-11-19 PROCEDURE — 77030013079 HC BLNKT BAIR HGGR 3M -A: Performed by: NURSE ANESTHETIST, CERTIFIED REGISTERED

## 2020-11-19 PROCEDURE — 77030002966 HC SUT PDS J&J -A: Performed by: SURGERY

## 2020-11-19 PROCEDURE — 77030040922 HC BLNKT HYPOTHRM STRY -A

## 2020-11-19 PROCEDURE — 77030019908 HC STETH ESOPH SIMS -A: Performed by: NURSE ANESTHETIST, CERTIFIED REGISTERED

## 2020-11-19 PROCEDURE — 77030016151 HC PROTCTR LNS DFOG COVD -B: Performed by: SURGERY

## 2020-11-19 PROCEDURE — 77030031139 HC SUT VCRL2 J&J -A: Performed by: SURGERY

## 2020-11-19 PROCEDURE — C1781 MESH (IMPLANTABLE): HCPCS | Performed by: SURGERY

## 2020-11-19 DEVICE — ANATOMICAL MESH PRE-SHAPED MONOFILAMENT POLYPROPYLENE TEXTILE WITH MARKING;RIGHT SIDE
Type: IMPLANTABLE DEVICE | Site: ABDOMEN | Status: FUNCTIONAL
Brand: DEXTILE

## 2020-11-19 DEVICE — ANATOMICAL MESH PRE-SHAPED MONOFILAMENT POLYPROPYLENE TEXTILE WITH MARKING;LEFT SIDE
Type: IMPLANTABLE DEVICE | Site: ABDOMEN | Status: FUNCTIONAL
Brand: DEXTILE

## 2020-11-19 RX ORDER — HYDROMORPHONE HYDROCHLORIDE 1 MG/ML
0.2 INJECTION, SOLUTION INTRAMUSCULAR; INTRAVENOUS; SUBCUTANEOUS
Status: DISCONTINUED | OUTPATIENT
Start: 2020-11-19 | End: 2020-11-19 | Stop reason: HOSPADM

## 2020-11-19 RX ORDER — EPHEDRINE SULFATE/0.9% NACL/PF 50 MG/5 ML
SYRINGE (ML) INTRAVENOUS AS NEEDED
Status: DISCONTINUED | OUTPATIENT
Start: 2020-11-19 | End: 2020-11-19 | Stop reason: HOSPADM

## 2020-11-19 RX ORDER — DEXAMETHASONE SODIUM PHOSPHATE 4 MG/ML
INJECTION, SOLUTION INTRA-ARTICULAR; INTRALESIONAL; INTRAMUSCULAR; INTRAVENOUS; SOFT TISSUE AS NEEDED
Status: DISCONTINUED | OUTPATIENT
Start: 2020-11-19 | End: 2020-11-19 | Stop reason: HOSPADM

## 2020-11-19 RX ORDER — SODIUM CHLORIDE 0.9 % (FLUSH) 0.9 %
5-40 SYRINGE (ML) INJECTION AS NEEDED
Status: DISCONTINUED | OUTPATIENT
Start: 2020-11-19 | End: 2020-11-19 | Stop reason: HOSPADM

## 2020-11-19 RX ORDER — SODIUM CHLORIDE, SODIUM LACTATE, POTASSIUM CHLORIDE, CALCIUM CHLORIDE 600; 310; 30; 20 MG/100ML; MG/100ML; MG/100ML; MG/100ML
25 INJECTION, SOLUTION INTRAVENOUS CONTINUOUS
Status: DISCONTINUED | OUTPATIENT
Start: 2020-11-19 | End: 2020-11-19 | Stop reason: HOSPADM

## 2020-11-19 RX ORDER — GLYCOPYRROLATE 0.2 MG/ML
INJECTION INTRAMUSCULAR; INTRAVENOUS AS NEEDED
Status: DISCONTINUED | OUTPATIENT
Start: 2020-11-19 | End: 2020-11-19 | Stop reason: HOSPADM

## 2020-11-19 RX ORDER — NEOSTIGMINE METHYLSULFATE 1 MG/ML
INJECTION, SOLUTION INTRAVENOUS AS NEEDED
Status: DISCONTINUED | OUTPATIENT
Start: 2020-11-19 | End: 2020-11-19 | Stop reason: HOSPADM

## 2020-11-19 RX ORDER — FENTANYL CITRATE 50 UG/ML
25 INJECTION, SOLUTION INTRAMUSCULAR; INTRAVENOUS
Status: DISCONTINUED | OUTPATIENT
Start: 2020-11-19 | End: 2020-11-19 | Stop reason: HOSPADM

## 2020-11-19 RX ORDER — ROCURONIUM BROMIDE 10 MG/ML
INJECTION, SOLUTION INTRAVENOUS AS NEEDED
Status: DISCONTINUED | OUTPATIENT
Start: 2020-11-19 | End: 2020-11-19 | Stop reason: HOSPADM

## 2020-11-19 RX ORDER — SODIUM CHLORIDE 0.9 % (FLUSH) 0.9 %
5-40 SYRINGE (ML) INJECTION EVERY 8 HOURS
Status: DISCONTINUED | OUTPATIENT
Start: 2020-11-19 | End: 2020-11-19 | Stop reason: HOSPADM

## 2020-11-19 RX ORDER — PROPOFOL 10 MG/ML
INJECTION, EMULSION INTRAVENOUS AS NEEDED
Status: DISCONTINUED | OUTPATIENT
Start: 2020-11-19 | End: 2020-11-19 | Stop reason: HOSPADM

## 2020-11-19 RX ORDER — FENTANYL CITRATE 50 UG/ML
INJECTION, SOLUTION INTRAMUSCULAR; INTRAVENOUS
Status: COMPLETED
Start: 2020-11-19 | End: 2020-11-19

## 2020-11-19 RX ORDER — SUCCINYLCHOLINE CHLORIDE 20 MG/ML
INJECTION INTRAMUSCULAR; INTRAVENOUS AS NEEDED
Status: DISCONTINUED | OUTPATIENT
Start: 2020-11-19 | End: 2020-11-19 | Stop reason: HOSPADM

## 2020-11-19 RX ORDER — ONDANSETRON 2 MG/ML
INJECTION INTRAMUSCULAR; INTRAVENOUS AS NEEDED
Status: DISCONTINUED | OUTPATIENT
Start: 2020-11-19 | End: 2020-11-19 | Stop reason: HOSPADM

## 2020-11-19 RX ORDER — FENTANYL CITRATE 50 UG/ML
INJECTION, SOLUTION INTRAMUSCULAR; INTRAVENOUS AS NEEDED
Status: DISCONTINUED | OUTPATIENT
Start: 2020-11-19 | End: 2020-11-19 | Stop reason: HOSPADM

## 2020-11-19 RX ORDER — IBUPROFEN 400 MG/1
400 TABLET ORAL
Qty: 20 TAB | Refills: 0 | Status: SHIPPED | OUTPATIENT
Start: 2020-11-19 | End: 2020-11-26

## 2020-11-19 RX ORDER — POLYETHYLENE GLYCOL 3350 17 G/17G
17 POWDER, FOR SOLUTION ORAL 2 TIMES DAILY
Qty: 60 PACKET | Refills: 0 | Status: SHIPPED | OUTPATIENT
Start: 2020-11-19 | End: 2020-12-19

## 2020-11-19 RX ORDER — LIDOCAINE HYDROCHLORIDE 10 MG/ML
0.1 INJECTION, SOLUTION EPIDURAL; INFILTRATION; INTRACAUDAL; PERINEURAL AS NEEDED
Status: DISCONTINUED | OUTPATIENT
Start: 2020-11-19 | End: 2020-11-19 | Stop reason: HOSPADM

## 2020-11-19 RX ORDER — OXYCODONE AND ACETAMINOPHEN 5; 325 MG/1; MG/1
1 TABLET ORAL ONCE
Status: COMPLETED | OUTPATIENT
Start: 2020-11-19 | End: 2020-11-19

## 2020-11-19 RX ORDER — DIPHENHYDRAMINE HYDROCHLORIDE 50 MG/ML
12.5 INJECTION, SOLUTION INTRAMUSCULAR; INTRAVENOUS AS NEEDED
Status: DISCONTINUED | OUTPATIENT
Start: 2020-11-19 | End: 2020-11-19 | Stop reason: HOSPADM

## 2020-11-19 RX ORDER — OXYCODONE AND ACETAMINOPHEN 5; 325 MG/1; MG/1
1 TABLET ORAL
Qty: 15 TAB | Refills: 0 | Status: SHIPPED | OUTPATIENT
Start: 2020-11-19 | End: 2020-11-24

## 2020-11-19 RX ORDER — LIDOCAINE HYDROCHLORIDE 20 MG/ML
INJECTION, SOLUTION EPIDURAL; INFILTRATION; INTRACAUDAL; PERINEURAL AS NEEDED
Status: DISCONTINUED | OUTPATIENT
Start: 2020-11-19 | End: 2020-11-19 | Stop reason: HOSPADM

## 2020-11-19 RX ORDER — BUPIVACAINE HYDROCHLORIDE AND EPINEPHRINE 2.5; 5 MG/ML; UG/ML
INJECTION, SOLUTION EPIDURAL; INFILTRATION; INTRACAUDAL; PERINEURAL AS NEEDED
Status: DISCONTINUED | OUTPATIENT
Start: 2020-11-19 | End: 2020-11-19 | Stop reason: HOSPADM

## 2020-11-19 RX ADMIN — GLYCOPYRROLATE 0.4 MG: 0.2 INJECTION, SOLUTION INTRAMUSCULAR; INTRAVENOUS at 09:32

## 2020-11-19 RX ADMIN — FENTANYL CITRATE 25 MCG: 50 INJECTION, SOLUTION INTRAMUSCULAR; INTRAVENOUS at 10:11

## 2020-11-19 RX ADMIN — FENTANYL CITRATE 25 MCG: 50 INJECTION, SOLUTION INTRAMUSCULAR; INTRAVENOUS at 10:06

## 2020-11-19 RX ADMIN — FENTANYL CITRATE 25 MCG: 50 INJECTION, SOLUTION INTRAMUSCULAR; INTRAVENOUS at 10:14

## 2020-11-19 RX ADMIN — FENTANYL CITRATE 50 MCG: 50 INJECTION, SOLUTION INTRAMUSCULAR; INTRAVENOUS at 07:37

## 2020-11-19 RX ADMIN — Medication 3 AMPULE: at 07:18

## 2020-11-19 RX ADMIN — ROCURONIUM BROMIDE 20 MG: 10 INJECTION INTRAVENOUS at 07:43

## 2020-11-19 RX ADMIN — ROCURONIUM BROMIDE 10 MG: 10 INJECTION INTRAVENOUS at 09:21

## 2020-11-19 RX ADMIN — ROCURONIUM BROMIDE 10 MG: 10 INJECTION INTRAVENOUS at 08:18

## 2020-11-19 RX ADMIN — GLYCOPYRROLATE 0.1 MG: 0.2 INJECTION, SOLUTION INTRAMUSCULAR; INTRAVENOUS at 07:54

## 2020-11-19 RX ADMIN — FENTANYL CITRATE 25 MCG: 50 INJECTION, SOLUTION INTRAMUSCULAR; INTRAVENOUS at 10:26

## 2020-11-19 RX ADMIN — PROPOFOL 20 MG: 10 INJECTION, EMULSION INTRAVENOUS at 07:56

## 2020-11-19 RX ADMIN — PROPOFOL 130 MG: 10 INJECTION, EMULSION INTRAVENOUS at 07:40

## 2020-11-19 RX ADMIN — WATER 2 G: 1 INJECTION INTRAMUSCULAR; INTRAVENOUS; SUBCUTANEOUS at 07:45

## 2020-11-19 RX ADMIN — OXYCODONE HYDROCHLORIDE AND ACETAMINOPHEN 1 TABLET: 5; 325 TABLET ORAL at 11:00

## 2020-11-19 RX ADMIN — ROCURONIUM BROMIDE 10 MG: 10 INJECTION INTRAVENOUS at 07:40

## 2020-11-19 RX ADMIN — SODIUM CHLORIDE, SODIUM LACTATE, POTASSIUM CHLORIDE, AND CALCIUM CHLORIDE 25 ML/HR: 600; 310; 30; 20 INJECTION, SOLUTION INTRAVENOUS at 07:17

## 2020-11-19 RX ADMIN — DEXAMETHASONE SODIUM PHOSPHATE 8 MG: 4 INJECTION, SOLUTION INTRAMUSCULAR; INTRAVENOUS at 07:50

## 2020-11-19 RX ADMIN — Medication 3 MG: at 09:32

## 2020-11-19 RX ADMIN — ROCURONIUM BROMIDE 10 MG: 10 INJECTION INTRAVENOUS at 08:14

## 2020-11-19 RX ADMIN — Medication 10 MG: at 07:53

## 2020-11-19 RX ADMIN — FENTANYL CITRATE 25 MCG: 50 INJECTION, SOLUTION INTRAMUSCULAR; INTRAVENOUS at 09:35

## 2020-11-19 RX ADMIN — LIDOCAINE HYDROCHLORIDE 80 MG: 20 INJECTION, SOLUTION EPIDURAL; INFILTRATION; INTRACAUDAL; PERINEURAL at 07:37

## 2020-11-19 RX ADMIN — SUCCINYLCHOLINE CHLORIDE 140 MG: 20 INJECTION, SOLUTION INTRAMUSCULAR; INTRAVENOUS at 07:40

## 2020-11-19 RX ADMIN — Medication 10 MG: at 07:50

## 2020-11-19 RX ADMIN — ONDANSETRON HYDROCHLORIDE 4 MG: 2 INJECTION, SOLUTION INTRAMUSCULAR; INTRAVENOUS at 09:32

## 2020-11-19 RX ADMIN — FENTANYL CITRATE 25 MCG: 50 INJECTION, SOLUTION INTRAMUSCULAR; INTRAVENOUS at 07:58

## 2020-11-19 NOTE — PERIOP NOTES
For dc home. Vswnl. Reports pain as 4-5/10 which is tolerable per pt. Denies nausea. dsgs to abd d&i. Went over [employer] instructions w/pt and friend including Rx and f/up. Verbalized understanding. dc'd home.

## 2020-11-19 NOTE — PERIOP NOTES
Patient's dentures and hearing aids removed prior to induction. Placed items in bags with patient labels. Circulating RN holding onto items until transfer to PACU.

## 2020-11-19 NOTE — DISCHARGE INSTRUCTIONS
Discharge Instructions:  Hernia Repair    Dr. Karina Valente    Call for appointment for follow up in 2 weeks [phone]    Activity:    Walk regularly. No lifting more than 5 to 10 pounds for 6 weeks. Light aerobic activity is okay when you feel up to it. You may resume driving in five days unless still requiring narcotics for pain. Work:    You may return to work in 2 or 3 weeks to light activity. No lifting more than 5 pounds for four weeks and no more than 10 pounds for an additional 2 weeks. Diet:    You may resume normal diet after 24 hours. Anesthesia and narcotics may cause nausea and vomiting. If persistent please call the office. Wound Care: You have a special dressing called Dermabond. It is okay to shower and let the water run over the incisions but do not scrub the area or soak in a tub. If you have a small amount of drainage you may place a dry bandage over the wound and change it daily. If you experience a lot of drainage, develop redness around the wound, or a fever over 101 F occurs please call the office. May use ice over incision for comfort. Medications:    Resume home medications as indicated on the Medical Reconciliation form. Aspirin and Coumadin can be restarted immediately if you were taking them preoperatively. If taking Plavix do not restart it until post operative day 2. Pain medications:  Non steroidal antiinflammatories seem to work best for post surgical pain. Try these first as prescribed. A narcotic prescription will also be given for breakthrough pain. Over the counter stool softeners and laxatives may be used if needed. Do not hesitate to call with questions or concerns.       DISCHARGE SUMMARY from Nurse    PATIENT INSTRUCTIONS:    After general anesthesia or intravenous sedation, for 24 hours or while taking prescription Narcotics:  · Limit your activities  · Do not drive and operate hazardous machinery  · Do not make important personal or business decisions  · Do  not drink alcoholic beverages  · If you have not urinated within 8 hours after discharge, please contact your surgeon on call. Report the following to your surgeon:  · Excessive pain, swelling, redness or odor of or around the surgical area  · Temperature over 100.5  · Nausea and vomiting lasting longer than 4 hours or if unable to take medications  · Any signs of decreased circulation or nerve impairment to extremity: change in color, persistent  numbness, tingling, coldness or increase pain  · Any questions    What to do at Home:    *  Please give a list of your current medications to your Primary Care Provider. *  Please update this list whenever your medications are discontinued, doses are      changed, or new medications (including over-the-counter products) are added. *  Please carry medication information at all times in case of emergency situations. These are general instructions for a healthy lifestyle:    No smoking/ No tobacco products/ Avoid exposure to second hand smoke  Surgeon General's Warning:  Quitting smoking now greatly reduces serious risk to your health. Obesity, smoking, and sedentary lifestyle greatly increases your risk for illness    A healthy diet, regular physical exercise & weight monitoring are important for maintaining a healthy lifestyle    You may be retaining fluid if you have a history of heart failure or if you experience any of the following symptoms:  Weight gain of 3 pounds or more overnight or 5 pounds in a week, increased swelling in our hands or feet or shortness of breath while lying flat in bed. Please call your doctor as soon as you notice any of these symptoms; do not wait until your next office visit. The discharge information has been reviewed with the patient and caregiver. The patient and caregiver verbalized understanding.   Discharge medications reviewed with the patient and caregiver and appropriate educational materials and side effects teaching were provided. ___________________________________________________________________________________________________________________________________    A common side effect of anesthesia following surgery is nausea and/or vomiting. In order to decrease symptoms, it is wise to avoid foods that are high in fat, greasy foods, milk products, and spicy foods for the first 24 hours. Acceptable foods for the first 24 hours following surgery include but are not limited to:     soup   broth    toast    crackers    applesauce    bananas    mashed potatoes,   soft or scrambled eggs   oatmeal    jello    It is important to eat when taking your pain medication. This will help to prevent nausea. If possible, please try to time your meals with your medications. It is very important to stay hydrated following surgery. Sip fluids frequently while awake. Avoid acidic drinks such as citrus juices and soda for 24 hours. Carbonated beverages may cause bloating and gas. Acceptable fluids include:    - water (flavor packets may add variety)  - coffee or tea (in moderation)  - Gatorade  - Chriss-aid  - apple juice  - cranberry juice    You are encouraged to cough and deep breathe every hour when awake. This will help to prevent respiratory complications following anesthesia. You may want to hug a pillow when coughing and sneezing to add additional support to the surgical area and to decrease discomfort if you had abdominal or chest surgery. If you are discharged home with support stockings, you may remove them after 24 hours. Support stockings are used to help prevent blood clots in the legs following surgery. Please take time to review all of your Home Care Instructions and Medication Information sheets provided in your discharge packet. If you have any questions, please contact your surgeons office. Thank you.               How to Care for Your Wound After Its Treated With  DERMABOND* Topical Skin Adhesive  DERMABOND* Topical Skin Adhesive (2-octyl cyanoacrylate) is a sterile, liquid skin adhesive  that holds wound edges together. The film will usually remain in place for 5 to 10 days, then  naturally fall off your skin. The following will answer some of your questions and provide instructions for proper care for your  wound while it is healing:    CHECK WOUND APPEARANCE   Some swelling, redness, and pain are common with all wounds and normally will go away as the  wound heals. If swelling, redness, or pain increases or if the wound feels warm to the touch,  contact a doctor. Also contact a doctor if the wound edges reopen or separate. REPLACE BANDAGES   If your wound is bandaged, keep the bandage dry.  Replace the dressing daily until the adhesive film has fallen off or if the  bandage should become wet, unless otherwise instructed by your  physician.  When changing the dressing, do not place tape directly over the  DERMABOND adhesive film, because removing the tape later may also  remove the film. AVOID TOPICAL MEDICATIONS   Do not apply liquid or ointment medications or any other product to your wound while the  DERMABOND adhesive film is in place. These may loosen the film before your wound is healed. KEEP WOUND DRY AND PROTECTED   You may occasionally and briefly wet your wound in the shower or bath. Do not soak or scrub  your wound, do not swim, and avoid periods of heavy perspiration until the DERMABOND  adhesive has naturally fallen off. After showering or bathing, gently blot your wound dry with a  soft towel. If a protective dressing is being used, apply a fresh, dry bandage, being sure to keep  the tape off the DERMABOND adhesive film.  Apply a clean, dry bandage over the wound if necessary to protect it.  Protect your wound from injury until the skin has had sufficient time to heal.   Do not scratch, rub, or pick at the DERMABOND adhesive film.  This may loosen the film before  your wound is healed.  Protect the wound from prolonged exposure to sunlight or tanning lamps while the film is in  place. If you have any questions or concerns about this product, please consult your doctor. *Trademark ©[employer] 2002    Patient Education   Narcotic-Analgesic/Acetaminophen (By mouth)   Relieves pain. Brand Name(s): Capital w/Codeine, [place], [place], [place], Lorcet HD, Lorcet Plus, Lortab 10/325, Lortab 5/325, Lortab 7.5/325, Lortab Elixir, [place], [place], [place], Trezix, Tylenol With Codeine No. 4   There may be other brand names for this medicine. When This Medicine Should Not Be Used: You should not use this medicine if you have had an allergic reaction to acetaminophen, codeine, hydrocodone, propoxyphene, or sulfites. You should not use this medicine if you have had an allergic reaction to any other opioid pain medicine. How to Use This Medicine:   Liquid, Tablet, Capsule  · Your doctor will tell you how much medicine to use. Do not use more than directed. · This medicine contains acetaminophen. Read the labels of all other medicines you are using to see if they also contain acetaminophen, or ask your doctor or pharmacist. Carlos Menardon not use more than 4 grams (4,000 milligrams) total of acetaminophen in one day. · Drink plenty of liquids to help avoid constipation. If a dose is missed:   · Some of these medicines need to be used on a fixed schedule. If you miss a dose or forget to use your medicine, call your doctor pharmacist for instructions. Do not use extra medicine to make up for a missed dose. How to Store and Dispose of This Medicine:   · Store the medicine in a closed container at room temperature, away from heat, moisture, and direct light. Do not refrigerate or freeze the medicine. · Ask your pharmacist, doctor, or health caregiver about the best way to dispose of any outdated medicine or medicine no longer needed.   · Keep all medicine out of the reach of children. Never share your medicine with anyone. Drugs and Foods to Avoid:   Ask your doctor or pharmacist before using any other medicine, including over-the-counter medicines, vitamins, and herbal products. · Make sure your doctor knows if you are using a monoamine oxidase inhibitor (MAOI) medicine, such as Eldepryl®, Marplan®, [place], or Parnate®. Make sure your doctor knows if you are also using a medicine to treat depression, such as amitriptyline, doxepin, nortriptyline, Elavil®, Pamelor®, or Sinequan®. Make sure your doctor knows if you are taking an anticholinergic medicine, such as atropine, methscopolamine, or scopolamine. · Tell your doctor if you use anything else that makes you sleepy. Some examples are allergy medicine, narcotic pain medicine, and alcohol. · Do not drink alcohol while you are using this medicine. Warnings While Using This Medicine:   · Make sure your doctor knows if you are pregnant or breast feeding, or if you have a head injury, or other conditions that may cause an increase in intercranial pressure (pressure inside your head). Make sure your doctor knows if you have severe kidney problems or severe liver problems, or if you have hypothyroidism (lack of thyroid function). Make sure your doctor knows if you have [place]'s disease (adrenal gland disease), or if you have enlarged prostate or urethral stricture. Make sure your doctor knows if you have any abdominal problems, or if you have lung disease or asthma. · This medicine may make you dizzy or drowsy. Avoid driving, using machines, or anything else that could be dangerous if you are not alert. · This medicine can be habit-forming. Do not use more than your prescribed dose. Call your doctor if you think your medicine is not working. · Tell any doctor or dentist who treats you that you are using this medicine. This medicine may affect certain medical test results.   · This medicine may cause constipation, especially with long-term use. Ask your doctor if you should use a laxative to prevent and treat constipation. · When a mother is breastfeeding and takes codeine, there is a very small chance that this medicine could cause serious side effects in the baby. This is because codeine works differently in a few women, so their breast milk contains too much medicine. If you take codeine, be alert for these signs of overdose in your nursing baby: sleeping more than usual, trouble breastfeeding, trouble breathing, or being limp and weak. Call the baby's doctor right away if you think there is a problem. If you cannot talk to the doctor, take the baby to the emergency room or call 911. Possible Side Effects While Using This Medicine:   Call your doctor right away if you notice any of these side effects:  · Allergic reaction: Itching or hives, swelling in your face or hands, swelling or tingling in your mouth or throat, chest tightness, trouble breathing  · Dizziness. · Seeing or hearing things that are not there. · Very slow heartbeat. If you notice these less serious side effects, talk with your doctor:   · Change in how much or how often you urinate. · Cold, clammy skin. · Feeling unusually anxious, excited, fearful, or tired. · Nausea, vomiting, constipation, stomach pain or upset, or heartburn. · Skin rash. · Vision changes. If you notice other side effects that you think are caused by this medicine, tell your doctor. Call your doctor for medical advice about side effects. You may report side effects to FDA at 6-907-FDA-7665  © 2017 [place] Information is for End User's use only and may not be sold, redistributed or otherwise used for commercial purposes. The above information is an  only. It is not intended as medical advice for individual conditions or treatments.  Talk to your doctor, nurse or pharmacist before following any medical regimen to see if it is safe and effective for you.

## 2020-11-19 NOTE — ANESTHESIA PREPROCEDURE EVALUATION
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)  Cerebral microvascular disease   Cerebral infarction involving right carotid artery   Cervical post-laminectomy syndrome   H/O Lumbar back pain with radiculopathy affecting left lower extremity s/p lumbar laminectomy  Lumbar post-laminectomy syndrome  Left homonymous hemianopsia Cardiovascular    Hypertension              Exercise tolerance: >4 METS  Comments: Bilateral carotid artery stenosis  Lymphedema of left leg    ECG (11/16/20):   Sinus bradycardia   Otherwise normal ECG    GI/Hepatic/Renal     GERD          Comments: Left inguinal hernia  Right recurrent inguinal hernia Endo/Other    Diabetes: well controlled, type 2    Arthritis    Comments: H/O Prostate Cancer s/p Prostatectomy  Borderline Diabetes  H/O Basal Cell Carcinoma (nose) s/p excision  H/O Squamous Cell Carcinoma (forehead) s/p excision  H/O Bladder Cancer Other Findings            Physical Exam    Airway  Mallampati: I  TM Distance: > 6 cm  Neck ROM: normal range of motion   Mouth opening: Normal     Cardiovascular  Regular rate and rhythm,  S1 and S2 normal,  no murmur, click, rub, or gallop             Dental    Dentition: Full upper dentures and Lower partial plate     Pulmonary  Breath sounds clear to auscultation               Abdominal  GI exam deferred       Other Findings            Anesthetic Plan    ASA: 3  Anesthesia type: general    Monitoring Plan: BIS      Induction: Intravenous  Anesthetic plan and risks discussed with: Patient

## 2020-11-19 NOTE — BRIEF OP NOTE
Brief Postoperative Note    Patient: Jelena Ureña  YOB: 1935  MRN: [id number]    Date of Procedure: 11/19/2020     Pre-Op Diagnosis: LEFT INGUINAL HERNIA, RIGHT RECURRENT INGUINAL HERNIA    Post-Op Diagnosis: Same as preoperative diagnosis. Procedure(s):  ROBOTIC ASSISTED LAPAROSCOPIC LEFT AND  RECURRENT RIGHT  INGUINAL HERNIA REPAIR WITH MESH    Surgeon(s):  Wes Brennan MD    Surgical Assistant: Surg Asst-1: Gianna Leahy    Anesthesia: General     Estimated Blood Loss (mL): Minimal    Complications: None    Specimens: * No specimens in log *     Implants:   Implant Name Type Inv.  Item Serial No.  Lot No. LRB No. Used Action   DEXTILE ANATOMICAL MESH 15CM X 10CM   N/A [employer] [id number] Right 1 Implanted   DEXTILE ANATOMICAL MESH 15CM X 10CM   N/A [employer] [id number] Left 1 Implanted       Drains:   [REMOVED] Orogastric Tube 11/19/20 (Removed)       Findings: extensive scarring, bilateral indirect    Electronically Signed by Juaquin Johnson MD on 11/19/2020 at 9:41 AM

## 2020-11-19 NOTE — ANESTHESIA POSTPROCEDURE EVALUATION
Procedure(s):  ROBOTIC ASSISTED LAPAROSCOPIC LEFT AND  RECURRENT RIGHT  INGUINAL HERNIA REPAIR WITH MESH. general    Anesthesia Post Evaluation        Patient location during evaluation: PACU  Note status: Adequate. Level of consciousness: responsive to verbal stimuli and sleepy but conscious  Pain management: satisfactory to patient  Airway patency: patent  Anesthetic complications: no  Cardiovascular status: acceptable  Respiratory status: acceptable  Hydration status: acceptable  Comments: +Post-Anesthesia Evaluation and Assessment    Patient: Dori Miles MRN: [id number]  SSN: [id number]   YOB: 1935  Age: 80 y.o. Sex: male      Cardiovascular Function/Vital Signs    BP (!) 132/55   Pulse 67   Temp 36.7 °C (98 °F)   Resp 16   Ht 5' 6.5\" (1.689 m)   Wt 69.6 kg (153 lb 7 oz)   SpO2 96%   BMI 24.39 kg/m²     Patient is status post Procedure(s):  ROBOTIC ASSISTED LAPAROSCOPIC LEFT AND  RECURRENT RIGHT  INGUINAL HERNIA REPAIR WITH MESH. Nausea/Vomiting: Controlled. Postoperative hydration reviewed and adequate. Pain:  Pain Scale 1: Numeric (0 - 10) (11/19/20 1014)  Pain Intensity 1: 7 (11/19/20 1014)   Managed. Neurological Status:   Neuro (WDL): Exceptions to WDL (11/19/20 0946)   At baseline. Mental Status and Level of Consciousness: Arousable. Pulmonary Status:   O2 Device: Nasal cannula (11/19/20 0946)   Adequate oxygenation and airway patent. Complications related to anesthesia: None    Post-anesthesia assessment completed. No concerns. Signed By: Jo Ann Atkins MD    11/19/2020  Post anesthesia nausea and vomiting:  controlled      INITIAL Post-op Vital signs:   Vitals Value Taken Time   /53 11/19/2020 10:20 AM   Temp 36.7 °C (98 °F) 11/19/2020  9:46 AM   Pulse 76 11/19/2020 10:24 AM   Resp 15 11/19/2020 10:24 AM   SpO2 96 % 11/19/2020 10:24 AM   Vitals shown include unvalidated device data.

## 2020-11-19 NOTE — PERIOP NOTES
covid test negative wears mask in public. Pt states no s/s covid virus nor has he been around anyone with the covid virus.

## 2020-11-19 NOTE — INTERVAL H&P NOTE
Reveal linq Summary Report shows:    No episodes or events     Update History & Physical 
 
The Patient's History and Physical of November 4, 2020 was reviewed with the patient and I examined the patient. There was no change. The surgical site was confirmed by the patient and me. Plan:  The risk, benefits, expected outcome, and alternative to the recommended procedure have been discussed with the patient. Patient understands and wants to proceed with the procedure.  
 
Electronically signed by Srinath Vaughn MD on 11/19/2020 at 6:58 AM

## 2020-11-20 ENCOUNTER — TELEPHONE (OUTPATIENT)
Dept: SURGERY | Age: 85
End: 2020-11-20

## 2020-11-20 NOTE — OP NOTES
[address]  OPERATIVE REPORT    Name:  Bayron Bennett  MR#:  [id number]  :  1935  ACCOUNT #:  [de-identified]  DATE OF SERVICE:  2020    PREOPERATIVE DIAGNOSES:  Left inguinal hernia and recurrent right inguinal hernia. POSTOPERATIVE DIAGNOSES:  Left inguinal hernia and recurrent right inguinal hernia. PROCEDURE PERFORMED:  Robotic-assisted transabdominal preperitoneal left inguinal hernia repair with mesh and recurrent right inguinal hernia repair with mesh. SURGEON:  Rahul Hu MD    ASSISTANT:  Kimberly Rodriguez. ANESTHESIA:  General.    COMPLICATIONS:  None. SPECIMENS REMOVED:  None. IMPLANTS:  Medtronic Dextile anatomical mesh 15 x 10 cm, on the right side was lot# [id number], on the left side was [id number]. ESTIMATED BLOOD LOSS:  Minimal.    FINDINGS:  Bilateral indirect defects. BRIEF HISTORY:  The patient is an 77-year-old gentleman with symptomatic hernia. Options were discussed and elected to undergo repair. He understands the risks and benefits and wishes to proceed. These are outlined in my office notes. He now presents for that. PROCEDURE:  The patient was taken to the operating room, placed on the operating table in the supine position, underwent general anesthesia, and the bed was mildly flexed and the abdomen was prepped and draped in usual sterile fashion. Previously, had an open prostatectomy and also had a right inguinal hernia repair. He now comes in for repair of both of those areas. After appropriate time-out and antibiotics were given, 0.5% Marcaine with epinephrine was infiltrated into the skin and subcutaneous tissues 1 cm medially and 1 cm superiorly to the anterior-superior iliac crest on each side for regional nerve block and also into the skin and subcutaneous tissues high in the epigastrium in the midline.   A small incision was made, and an 8-mm direct entry trocar was inserted and insufflation begun, 15 mmHg pressure gave good visualization of the peritoneal cavity. An 8-mm trocar was placed to the right and then also to the left in the upper abdomen and we subsequently docked the robot. With the patient in Trendelenburg, we incised along the peritoneum on the right side at the level of the anterior superior iliac crest and went lateral to medial, and then took down the peritoneum and dissected out on both sides. It was extensively scarred down from the prior open prostatectomy and the prior right hernia repair with the sutures in there. We were able to dissect it off free with difficulty, we were able to get it all dissected out and dissected by length laterally as well as inferior to the pubic bone. I took a right sided piece of the anatomical mesh, placed in the preperitoneal space and then laid it out, and it seemed to be sitting well. Interrupted 2-0 PDS was used to serve it in place and fixed it in three places, one medially and over the pubis anteriorly, in the midline, and then along the Wilver's ligament going more superolaterally. He had good coverage of the region where the cord structures were. Then, we closed the peritoneum with a 2-0 absorbable V-Loc suture. I then worked on the left side similarly. We created a window and then dissected out the area. This was also scarred down more, making it more challenging, but ultimately, we were able to get it out and taken care off. After the left side was completely dissected out, I took a left-sided anatomical mesh and placed it in the preperitoneal space and again it is fixed in the three points. Running 2-0 absorbable V-Loc was used to reapproximate the peritoneum again. There was a tear in the peritoneum where the indirect sac was that was also closed also with the running V-Loc. Once that was completed, no evidence of any ongoing bleeding. Trocars were removed and CO2 was allowed to evacuate from the abdominal cavity.   Running 4-0 Vicryl was used to close the skin and a Dermabond dressing was applied. Upon completion of the operation, the needle, sponge, and instrument counts were correct x2. The patient had tolerated the procedure well, was extubated and brought to the recovery room.         Rosemary Bain MD [id number]  D:  11/19/2020 9:47  T:  11/19/2020 20:25  JOB #:  [id number]  CC:  MD Rahul Currie MD

## 2020-11-20 NOTE — TELEPHONE ENCOUNTER
Spoke with patient and explained to him that it is normal to have swelling as well as bruising to his scrotum and penis. The bloating is due to gas that they inflate your abdomen with during surgery. I also explained that the constipation is normal due to the anesthesia. Explained that he can take the miralax twice daily, which he had only been taking once daily, explained the importance of increasing his water intake to help with constipation. Told patient that he can elevate his scrotum to help decrease the swelling, as well as cool compresses for 15 min and then off for 30 min. ER precaustions given to patient. He verbalized understanding to the above statement.

## 2020-11-20 NOTE — TELEPHONE ENCOUNTER
Patient hasn't had a BM since the day before surgery, and said his stomach is really bloated, and has a lot of bruising and swelling down in his privates.

## 2020-12-07 ENCOUNTER — OFFICE VISIT (OUTPATIENT)
Dept: SURGERY | Age: 85
End: 2020-12-07
Payer: MEDICARE

## 2020-12-07 VITALS — WEIGHT: 154 LBS | HEIGHT: 67 IN | BODY MASS INDEX: 24.17 KG/M2

## 2020-12-07 DIAGNOSIS — Z09 POSTOPERATIVE EXAMINATION: Primary | ICD-10-CM

## 2020-12-07 PROCEDURE — 99024 POSTOP FOLLOW-UP VISIT: CPT | Performed by: SURGERY

## 2020-12-07 NOTE — PROGRESS NOTES
dentified pt with two pt identifiers(name and ). Reviewed record in preparation for visit and have obtained necessary documentation. All patient medications has been reviewed. Chief Complaint   Patient presents with    Surgical Follow-up     p/o hernia        Health Maintenance Due   Topic    Foot Exam Q1     MICROALBUMIN Q1     Eye Exam Retinal or Dilated     Shingrix Vaccine Age 50> (1 of 2)    GLAUCOMA SCREENING Q2Y     Pneumococcal 65+ years (1 of 1 - PPSV23)    Medicare Yearly Exam     Flu Vaccine (1)       Vitals:    20 1110   Weight: 69.9 kg (154 lb)   Height: 5' 6.5\" (1.689 m)       4. Have you been to the ER, urgent care clinic since your last visit? Hospitalized since your last visit? Yes, [facility] 2020 for constipation and urine retention    5. Have you seen or consulted any other health care providers outside of the [address] [place] [address] since your last visit? Include any pap smears or colon screening. No      Patient is accompanied by self I have received verbal consent from Mojgan Nelson to discuss any/all medical information while they are present in the room.

## 2020-12-07 NOTE — LETTER
12/7/20 Patient: Denisha Seen YOB: 1935 Date of Visit: 12/7/2020 Anel Zafar MD 
[address] [place] [address] [place] [address] VIA Facsimile: [phone] Heriberto Rios, NP 
[address] [place] [address] [address] [facility] [address] VIA Facsimile: [phone] Dear MD Heriberto Gomez, NP, Thank you for referring Mr. Denny Seen to [address] [place] [address] for evaluation. My notes for this consultation are attached. If you have questions, please do not hesitate to call me. I look forward to following your patient along with you.  
 
 
Sincerely, 
 
Domi Richard MD

## 2020-12-07 NOTE — PROGRESS NOTES
Surgery  Follow up  Procedure: robotic LOREN left and recurrent RIH with mesh  OR date:  11/19/2020  Path:  none    S I feel fine, mild right orchalgia    Visit Vitals  Ht 5' 6.5\" (1.689 m)   Wt 69.9 kg (154 lb)   BMI 24.48 kg/m²       O Incisions healing well without infection   No signs of hernia    A/P Doing well   Mild orchalgia on right   No lifting for another 4 weeks   RTC 6 weeks or prn    Jenna De Santiago MD FACS

## 2021-01-04 ENCOUNTER — HOSPITAL ENCOUNTER (OUTPATIENT)
Dept: GENERAL RADIOLOGY | Age: 86
Discharge: HOME | End: 2021-01-04
Payer: MEDICARE

## 2021-01-04 ENCOUNTER — TRANSCRIBE ORDER (OUTPATIENT)
Dept: GENERAL RADIOLOGY | Age: 86
End: 2021-01-04

## 2021-01-04 DIAGNOSIS — R06.09 OTHER FORM OF DYSPNEA: Primary | ICD-10-CM

## 2021-01-04 DIAGNOSIS — R06.09 OTHER FORM OF DYSPNEA: ICD-10-CM

## 2021-01-04 PROCEDURE — 71046 X-RAY EXAM CHEST 2 VIEWS: CPT | Performed by: FAMILY MEDICINE

## 2021-01-27 ENCOUNTER — TRANSCRIBE ORDER (OUTPATIENT)
Dept: SCHEDULING | Age: 86
End: 2021-01-27

## 2021-01-27 DIAGNOSIS — S52.532A CLOSED COLLES' FRACTURE OF LEFT RADIUS: ICD-10-CM

## 2021-01-27 DIAGNOSIS — M80.00XA AGE-RELATED OSTEOPOROSIS WITH CURRENT PATHOLOGICAL FRACTURE: Primary | ICD-10-CM

## 2021-01-27 DIAGNOSIS — M81.0 AGE RELATED OSTEOPOROSIS: Primary | ICD-10-CM

## 2021-01-27 DIAGNOSIS — M80.00XA AGE-RELATED OSTEOPOROSIS WITH CURRENT PATHOLOGICAL FRACTURE, INITIAL ENCOUNTER: ICD-10-CM

## 2021-02-10 ENCOUNTER — HOSPITAL ENCOUNTER (OUTPATIENT)
Dept: BONE DENSITY | Age: 86
Discharge: HOME | End: 2021-02-10
Attending: FAMILY MEDICINE
Payer: MEDICARE

## 2021-02-10 DIAGNOSIS — M81.0 AGE RELATED OSTEOPOROSIS: ICD-10-CM

## 2021-02-10 DIAGNOSIS — S52.532A CLOSED COLLES' FRACTURE OF LEFT RADIUS: ICD-10-CM

## 2021-02-10 DIAGNOSIS — M80.00XA AGE-RELATED OSTEOPOROSIS WITH CURRENT PATHOLOGICAL FRACTURE, INITIAL ENCOUNTER: ICD-10-CM

## 2021-02-10 PROCEDURE — 77080 DXA BONE DENSITY AXIAL: CPT

## 2021-03-12 ENCOUNTER — HOSPITAL ENCOUNTER (OUTPATIENT)
Dept: ULTRASOUND IMAGING | Age: 86
Discharge: HOME | End: 2021-03-12
Attending: SURGERY
Payer: MEDICARE

## 2021-03-12 ENCOUNTER — OFFICE VISIT (OUTPATIENT)
Dept: SURGERY | Age: 86
End: 2021-03-12
Payer: MEDICARE

## 2021-03-12 VITALS
SYSTOLIC BLOOD PRESSURE: 115 MMHG | OXYGEN SATURATION: 94 % | HEIGHT: 67 IN | TEMPERATURE: 97.3 F | BODY MASS INDEX: 24.74 KG/M2 | RESPIRATION RATE: 16 BRPM | DIASTOLIC BLOOD PRESSURE: 73 MMHG | WEIGHT: 157.6 LBS | HEART RATE: 72 BPM

## 2021-03-12 DIAGNOSIS — R10.32 LEFT GROIN PAIN: ICD-10-CM

## 2021-03-12 DIAGNOSIS — I89.0 LYMPHEDEMA OF LEFT LEG: ICD-10-CM

## 2021-03-12 DIAGNOSIS — R10.32 LEFT GROIN PAIN: Primary | ICD-10-CM

## 2021-03-12 PROCEDURE — G8536 NO DOC ELDER MAL SCRN: HCPCS | Performed by: SURGERY

## 2021-03-12 PROCEDURE — G8432 DEP SCR NOT DOC, RNG: HCPCS | Performed by: SURGERY

## 2021-03-12 PROCEDURE — G8419 CALC BMI OUT NRM PARAM NOF/U: HCPCS | Performed by: SURGERY

## 2021-03-12 PROCEDURE — 1101F PT FALLS ASSESS-DOCD LE1/YR: CPT | Performed by: SURGERY

## 2021-03-12 PROCEDURE — 76882 US LMTD JT/FCL EVL NVASC XTR: CPT

## 2021-03-12 PROCEDURE — G8427 DOCREV CUR MEDS BY ELIG CLIN: HCPCS | Performed by: SURGERY

## 2021-03-12 PROCEDURE — 99213 OFFICE O/P EST LOW 20 MIN: CPT | Performed by: SURGERY

## 2021-03-12 NOTE — PROGRESS NOTES
Identified pt with two pt identifiers(name and ). Reviewed record in preparation for visit and have obtained necessary documentation. All patient medications has been reviewed. Chief Complaint   Patient presents with    Hernia (Non Specific)     hermia surgery 21 thinks hernia is back. Health Maintenance Due   Topic    Foot Exam Q1     MICROALBUMIN Q1     Eye Exam Retinal or Dilated     COVID-19 Vaccine (1)    Shingrix Vaccine Age 50> (1 of 2)    GLAUCOMA SCREENING Q2Y     Pneumococcal 65+ years (1 of 1 - PPSV23)    Medicare Yearly Exam     Flu Vaccine (1)       Vitals:    21 1155   BP: 115/73   Pulse: 72   Resp: 16   Temp: 97.3 °F (36.3 °C)   TempSrc: Temporal   SpO2: 94%   Weight: 71.5 kg (157 lb 9.6 oz)   Height: 5' 6.5\" (1.689 m)   PainSc:   0 - No pain       4. Have you been to the ER, urgent care clinic since your last visit? Hospitalized since your last visit? No    5. Have you seen or consulted any other health care providers outside of the [address] [place] [address] since your last visit? Include any pap smears or colon screening. No      Patient is accompanied by self I have received verbal consent from Corey Wisdom to discuss any/all medical information while they are present in the room.

## 2021-03-12 NOTE — PROGRESS NOTES
HISTORY OF PRESENT ILLNESS  Renuka Meraz is a 80 y.o. male who comes in for evaluation with concern for a recurrent hernia  HPI    I did a RA left and recurrent RIH repeat with mesh in Nov 2020. Recently he has noted increased swelling in the left groin. He has chronic LLE lymphedema since his prostatectomy. He notices some swelling in the left groin and is concerned about recurrence. He denies associated nausea, vomiting, diarrhea, constipation, melena, hematochezia or dysuria. He previously had a RIH and an open radical prostatectomy for malignancy.        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    Insomnia     Neuropathy     in both feet    Prostate cancer ([facility] [address]     Selenium deficiency     Staph infection     Both eyes     Past Surgical History:   Procedure Laterality Date    HX CARPAL TUNNEL RELEASE Left 2012    HX CARPAL TUNNEL RELEASE Right 2015    HX CATARACT REMOVAL Right 2009    HX CATARACT REMOVAL Left 2019    HX HERNIA REPAIR  11/19/2020    Recurrent right inguinal hernia     HX ORTHOPAEDIC      cervical spine spacers placed    HX ORTHOPAEDIC  7-2012    lumbar (back surgery)    HX ORTHOPAEDIC Bilateral     wrist-Reconstructed after Carpal Tunnel Release    HX OTHER SURGICAL  10/2011    basal cell CA nose    HX OTHER SURGICAL      squamous cell removed from forehead    HX PROSTATECTOMY  1998    due to CA    HX ROTATOR CUFF REPAIR Right 2009    Dr. Tenzin Murcia HX SEPTOPLASTY      HX UROLOGICAL      surgery inside of my bladder,     Family History   Problem Relation Age of Onset    Other Mother         old age   [facility] Other Father         typhoid fever    Hypertension Brother     Asthma Brother     Cancer Brother         esophageal    Diabetes Brother     Arthritis-osteo Brother      Social History     Tobacco Use    Smoking status: Never Smoker    Smokeless tobacco: Former User    Tobacco comment: chewing tobacco quit years ago   Substance Use Topics    Alcohol use: Yes     Alcohol/week: 2.0 standard drinks     Types: 2 Shots of liquor per week    Drug use: Yes     Types: Prescription, OTC     Current Outpatient Medications   Medication Sig    cyanocobalamin 1,000 mcg tablet Take 1,000 mcg by mouth daily.  gabapentin (NEURONTIN) 600 mg tablet Take 1 Tab by mouth three (3) times daily. Max Daily Amount: 1,800 mg.    DULoxetine (CYMBALTA) 30 mg capsule TAKE ONE CAPSULE BY MOUTH DAILY    cholecalciferol (VITAMIN D3) (1000 Units /25 mcg) tablet Take 1,000 Units by mouth daily.  tadalafil (CIALIS) 20 mg tablet Take 20 mg by mouth as needed for Erectile Dysfunction.  potassium 99 mg tablet Take 99 mg by mouth nightly.  magnesium 250 mg tab Take 1 Tab by mouth daily.  RESTASIS 0.05 % ophthalmic emulsion Administer 1 Drop to both eyes every twelve (12) hours.  celecoxib (CELEBREX) 200 mg capsule Take 200 mg by mouth daily.  lisinopril (PRINIVIL, ZESTRIL) 10 mg tablet Take 10 mg by mouth nightly.  traZODone (DESYREL) 50 mg tablet Take 100 mg by mouth nightly.  copper gluconate 2 mg tab tablet Take 2 mg by mouth daily.  FOLIC ACID PO Take 1 Tab by mouth daily.  SALMON OIL/OMEGA-3 FATTY ACIDS (SALMON OIL-1000 PO) Take 1 Tab by mouth daily.  SACCHAROMYCES BOULARDII (PROBIOTIC, S.BOULARDII, PO) Take 1 Tab by mouth daily.  omeprazole (PRILOSEC) 40 mg capsule Take 40 mg by mouth daily.  pyridoxine (VITAMIN B-6) 200 mg tablet Take 200 mg by mouth daily.  nicotinic acid (NIACIN) 500 mg tablet Take 500 mg by mouth nightly.  GLUCOSAMINE HCL/CHONDR WELDON A NA (GLUCOSAMINE-CHONDROITIN) 750-600 mg Tab Take 2 Tabs by mouth daily.     multivitamin (ONE A DAY) tablet Take 1 Tab by mouth daily. No current facility-administered medications for this visit. Allergies   Allergen Reactions    Sulfa (Sulfonamide Antibiotics) Hives       Review of Systems   Constitutional: Negative for chills, diaphoresis, fever, malaise/fatigue and weight loss. HENT: Negative for congestion, ear pain and sore throat. Eyes: Negative for blurred vision and pain. Respiratory: Negative for cough, hemoptysis, sputum production, shortness of breath, wheezing and stridor. Cardiovascular: Negative for chest pain, palpitations, orthopnea, claudication, leg swelling and PND. Gastrointestinal: Positive for abdominal pain. Negative for blood in stool, constipation, diarrhea, heartburn, melena, nausea and vomiting. Genitourinary: Negative for dysuria, flank pain, frequency, hematuria and urgency. Musculoskeletal: Positive for back pain. Negative for joint pain, myalgias and neck pain. Skin: Negative for itching and rash. Neurological: Negative for dizziness, tremors, focal weakness, seizures, weakness and headaches. Endo/Heme/Allergies: Negative for polydipsia. Psychiatric/Behavioral: Negative for depression and memory loss. The patient is not nervous/anxious. Visit Vitals  /73 (BP 1 Location: Left upper arm, BP Patient Position: Sitting, BP Cuff Size: Large adult)   Pulse 72   Temp 97.3 °F (36.3 °C) (Temporal)   Resp 16   Ht 5' 6.5\" (1.689 m)   Wt 71.5 kg (157 lb 9.6 oz)   SpO2 94%   BMI 25.06 kg/m²       Physical Exam  Constitutional:       General: He is not in acute distress. Appearance: Normal appearance. He is well-developed. He is not diaphoretic. HENT:      Head: Normocephalic and atraumatic. Mouth/Throat:      Pharynx: No oropharyngeal exudate. Eyes:      General: No scleral icterus. Conjunctiva/sclera: Conjunctivae normal.      Pupils: Pupils are equal, round, and reactive to light.    Neck:      Musculoskeletal: Normal range of motion and neck supple. Thyroid: No thyromegaly. Trachea: No tracheal deviation. Cardiovascular:      Rate and Rhythm: Normal rate and regular rhythm. Heart sounds: Normal heart sounds. No murmur. No friction rub. No gallop. Pulmonary:      Effort: Pulmonary effort is normal. No respiratory distress. Breath sounds: Normal breath sounds. No stridor. No wheezing or rales. Abdominal:      General: Bowel sounds are normal. There is no distension. Palpations: Abdomen is soft. There is no mass. Tenderness: There is no abdominal tenderness. There is no right CVA tenderness, left CVA tenderness, guarding or rebound. Negative signs include Gonsales's sign and Rovsing's sign. Hernia: No hernia is present. There is no hernia in the umbilical area, ventral area, left inguinal area (no hernia but edema in subcutaneous tissues) or right inguinal area. Genitourinary:     Penis: Circumcised. Testes: Normal. Cremasteric reflex is present. Musculoskeletal: Normal range of motion. General: No tenderness. Left lower leg: Edema present. Lymphadenopathy:      Cervical: No cervical adenopathy. Skin:     General: Skin is warm and dry. Findings: No erythema or rash. Neurological:      Mental Status: He is alert and oriented to person, place, and time. Cranial Nerves: No cranial nerve deficit. Coordination: Coordination normal.   Psychiatric:         Behavior: Behavior normal.         Thought Content: Thought content normal.         Judgment: Judgment normal.         ASSESSMENT and PLAN  1. Let groin edema and LLE edema. Suspect lymphedema which the latter has been present since his prostate surgery  Refer to lymphedema clinic    2. Hx prostate ca  MARY  3. NIDDM type 2.   Diet controlled    Recommend f/u with Becky Quintero MD for fluid management     Deyanira Mercer MD FACS

## 2021-03-12 NOTE — LETTER
3/13/2021 Patient: Renuka Meraz YOB: 1935 Date of Visit: 3/12/2021 Diana Guillen MD 
[address] [facility] [address] Via Fax: [phone] Dear Diana Guillen MD, Thank you for referring Mr. Hina Sellers to [address] for evaluation. My notes for this consultation are attached. If you have questions, please do not hesitate to call me. I look forward to following your patient along with you.  
 
 
Sincerely, 
 
Mavis Gallagher MD

## 2021-03-13 PROBLEM — K40.91 RECURRENT RIGHT INGUINAL HERNIA: Status: RESOLVED | Noted: 2020-11-04 | Resolved: 2021-03-13

## 2021-03-13 PROBLEM — K40.90 LEFT INGUINAL HERNIA: Status: RESOLVED | Noted: 2020-11-04 | Resolved: 2021-03-13

## 2021-03-15 ENCOUNTER — TELEPHONE (OUTPATIENT)
Dept: SURGERY | Age: 86
End: 2021-03-15

## 2021-03-15 NOTE — TELEPHONE ENCOUNTER
US noted a small lipoma but no hernia  He plans observation unless it enlarges or becomes sore    Fátima Lindsey MD FACS

## 2021-04-01 ENCOUNTER — APPOINTMENT (OUTPATIENT)
Dept: PHYSICAL THERAPY | Age: 86
End: 2021-04-01

## 2021-04-19 ENCOUNTER — HOSPITAL ENCOUNTER (OUTPATIENT)
Dept: PHYSICAL THERAPY | Age: 86
Discharge: HOME | End: 2021-04-19
Payer: MEDICARE

## 2021-04-19 PROCEDURE — 97140 MANUAL THERAPY 1/> REGIONS: CPT

## 2021-04-19 PROCEDURE — 97110 THERAPEUTIC EXERCISES: CPT

## 2021-04-19 PROCEDURE — 97161 PT EVAL LOW COMPLEX 20 MIN: CPT

## 2021-04-19 NOTE — PROGRESS NOTES
[facility]  Lymphedema Clinic  [facility], [address], [facility] 22.    INITIAL EVALUATION    NAME: Anish Damico AGE: 80 y.o. GENDER: male  DATE: 4/19/2021  REFERRING PHYSICIAN: Andrew Weir MD  HISTORY AND BACKGROUND:   Primary Diagnosis:  · L LE Lymphedema, not elsewhere classified (I89.0)  Other Treatment Diagnoses:   Abnormality of gait (R26.9)   Swelling not relieved by elevation (R60.9)   History of prostate cancer   History of bladder cancer  Date of Onset: 11/17/1998  Present Symptoms and Functional Limitations: Patient reports that swelling in the L leg began 22 years ago soon after having surgery for prostate cancer. The swelling is intermittent but has recently increased in the lower leg. He was seen in the Lymphedema Clinic at [facility] in [address] [place] [address] and treatment included multi-layer compression bandaging, manual lymphatic drainage, and the use of day and night compression garments. He has self purchased ready made knee high and thigh highs throughout the years but is unsure of the level of compression. Patient reports having chronic back pain requiring back surgery and multiple epidural steroid injections. He has peripheral neuropathy in his legs and atrophy in the R leg. He has a history of falls with the most recent fall being in January. He suffered a concussion and a broken wrist which required a cast for at least 6 weeks. Patient is concerned the wrist is not fully healed and plans to [facility] to make a follow up appointment. [facility] Lymphedema Assessment Scale: 7/20.   Past Medical History:   Past Medical History:   Diagnosis Date    Arthritis     osteoarthritis    Basal cell carcinoma (BCC) of skin of nose     Bladder cancer (HCC)     Chronic pain     back    Diabetes (HCC)     borderline    Foot drop, right     GERD (gastroesophageal reflux disease)     Hypertension     Ill-defined condition lymphodemia in left leg    Insomnia     Neuropathy     in both feet    Prostate cancer ([address]     Selenium deficiency     Staph infection     Both eyes     Past Surgical History:   Procedure Laterality Date    HX CARPAL TUNNEL RELEASE Left 2012    HX CARPAL TUNNEL RELEASE Right 2015    HX CATARACT REMOVAL Right 2009    HX CATARACT REMOVAL Left 2019    HX HERNIA REPAIR  11/19/2020    Recurrent right inguinal hernia     HX ORTHOPAEDIC      cervical spine spacers placed    HX ORTHOPAEDIC  7-2012    lumbar (back surgery)    HX ORTHOPAEDIC Bilateral     wrist-Reconstructed after Carpal Tunnel Release    HX OTHER SURGICAL  10/2011    basal cell CA nose    HX OTHER SURGICAL      squamous cell removed from forehead    HX PROSTATECTOMY  1998    due to CA    HX ROTATOR CUFF REPAIR Right 2009    Dr. Dasilva Bristol     HX SEPTOPLASTY      HX UROLOGICAL      surgery inside of my bladder,     Current Medications:    Current Outpatient Medications   Medication Sig    cyanocobalamin 1,000 mcg tablet Take 1,000 mcg by mouth daily.  gabapentin (NEURONTIN) 600 mg tablet Take 1 Tab by mouth three (3) times daily. Max Daily Amount: 1,800 mg.    DULoxetine (CYMBALTA) 30 mg capsule TAKE ONE CAPSULE BY MOUTH DAILY    cholecalciferol (VITAMIN D3) (1000 Units /25 mcg) tablet Take 1,000 Units by mouth daily.  tadalafil (CIALIS) 20 mg tablet Take 20 mg by mouth as needed for Erectile Dysfunction.  potassium 99 mg tablet Take 99 mg by mouth nightly.  magnesium 250 mg tab Take 1 Tab by mouth daily.  RESTASIS 0.05 % ophthalmic emulsion Administer 1 Drop to both eyes every twelve (12) hours.  celecoxib (CELEBREX) 200 mg capsule Take 200 mg by mouth daily.  lisinopril (PRINIVIL, ZESTRIL) 10 mg tablet Take 10 mg by mouth nightly.  traZODone (DESYREL) 50 mg tablet Take 100 mg by mouth nightly.  copper gluconate 2 mg tab tablet Take 2 mg by mouth daily.  FOLIC ACID PO Take 1 Tab by mouth daily.     SALMON OIL/OMEGA-3 FATTY ACIDS (SALMON OIL-1000 PO) Take 1 Tab by mouth daily.  SACCHAROMYCES BOULARDII (PROBIOTIC, S.BOULARDII, PO) Take 1 Tab by mouth daily.  omeprazole (PRILOSEC) 40 mg capsule Take 40 mg by mouth daily.  pyridoxine (VITAMIN B-6) 200 mg tablet Take 200 mg by mouth daily.  nicotinic acid (NIACIN) 500 mg tablet Take 500 mg by mouth nightly.  GLUCOSAMINE HCL/CHONDR WELDON A NA (GLUCOSAMINE-CHONDROITIN) 750-600 mg Tab Take 2 Tabs by mouth daily.  multivitamin (ONE A DAY) tablet Take 1 Tab by mouth daily. No current facility-administered medications for this encounter. Allergies: Allergies   Allergen Reactions    Sulfa (Sulfonamide Antibiotics) Hives        Prior Level of Function/Social/Work History/Personal factors and/or comorbidities impacting plan of care: Patient is retired. He enjoys golf and working in the yard but has been less active after suffering a fall in January. Living Situation: Lives alone in a one story house. Trainable Caregiver?: Possibly - he has a lady friend but prefers to remain independent. Mobility: Independent gait without any assistive device for community distances  Sleeping Arrangement:  in bed at night  Adaptive Equipment Owned: cane and rolling walker, Tang donning aide  Other: Patient has been able to don compression stockings in the past     Previous Therapy: In [facility] at least 20 years ago  Compression/Lymphedema Equipment: Self purchased knee highs and thigh highs from a local pharmacy. Unknown level of compression. Will bring garments to the next visit. SUBJECTIVE:   Patient reports occasionally wearing a knee high or thigh high on the L leg. The swelling tends to fluctuate and he will go to the local pharmacy to be measured for garments when needed. He is not wearing compression garments at this time. He broke his wrist in January and he is concerned that it is not healing.      Patient's goals for therapy: assess swelling and garment needs. OBJECTIVE DATA SUMMARY:   EXAMINATION/PRESENTATION/DECISION MAKING:   Pain:  Pain Scale 1: Numeric (0 - 10)  Pain Intensity 1: 0    Self-Care and ADLs: Modified independent with increased time. Skin and Tissue Assessment:  Dermal Status: Intact     Texture/Consistency: Boggy L leg and ankle    Pigmentation/Color Change: Normal    Anomalies: N/A    Circulatory: Varicosities, pulses    Nails: Normal except for the L big toe    Stemmers Sign: Negative    Wound/Ulcer:  None        Volumetric Measurements:   Right:  7,531. 94 mL Left:  7,846.49 mL   % Difference: 4.18% L LE > R LE Dominance: Right     Girth measurements of the L lower leg and knee are 1 to 4 cm greater than girth measurements of the R lower leg and knee. Range of Motion: within functional limits except ankle R ankle dorsiflexion actively to neutral when tested in supine position. R shoulder limitations due to previous surgery. R wrist not tested due to recent fracture. Strength: Not formally assessed 2* patient is able to complete all functional activities in the clinic today. R ankle dorsiflexion 2+/5 to 3-/5  Sensation:  Impaired due to peripheral neuropathy    Mobility:     Bed/Chair Mobility: Modified independent  Transfers: Modified independent  Gait: Independent with steppage gait on the R to compensate for foot drop. Patient has an AFO which he prefers not to wear. Endurance: Good per patient - rather sedentary since fall in January    Safety:  Patient is alert and oriented: Yes  Safety awareness: Fair - prefers not to wear AFO despite history of falls. Fall risk?: Yes with recent history of falls. Patient given written fall prevention handout: Yes    Based on the above components, the patient evaluation is determined to be of the following complexity level: LOW     Evaluation Time: 1:30-2:00 pm     TREATMENT PROVIDED:   1.   Treatment description:  Therapeutic Exercise and Procedure: Patient instructed in activity restrictions and exercise guidelines. Therapist demonstrated deep abdominal breathing and a remedial lymphedema range of motion exercise program to be done in sitting. Patient was able to complete the routine times 3 to 5 reps and deep abdominal breathing with fair performance. Patient has been asked to complete breathing exercises and the decongestive exercise routine daily. Provided patient with written HEP to follow. Treatment time:  2:50-3:00 pm  Minutes: 10    2. Treatment description:  Manual Therapy: Patient instructed in skin care principles and anatomy of the lymphatic system. Therapist able to demonstrate manual lymphatic drainage techniques for the drainage of L LE and skin care protocol:   Patient was educated in daily skin care with low PH lotion and manual lymph drainage techniques were demonstrated in the clinic today. Patient was educated in the prevention and treatment of skin injuries and signs and symptoms of infection. Discussed standard lymphedema precautions to include avoiding blood pressure readings, injections and IVs or other procedures/acts that could lead to broken skin on affected area, and avoiding excessive heat, resistive activity or altitude without compression garment. Patient was instructed to lie in bed for 15 minute intervals throughout the day to limit the affect of gravity on swelling. Discussed the role and benefit of multi-layer compression bandaging for reduction of swelling and the cost of bandaging supplies with patient. Patient tolerated multi-layer compression bandaging in the past but due to patient request and history of falls, bandaging will be deferred at this time. Provided patient with samples of flat-knit, circular knit, and foam products for the L LE and patient voiced interest in obtaining garments in the future. Will attempt to secure a vendor and check insurance coverage for garments per patient request prior to next visit. Treatment time:  2:00-2:50 pm  Minutes: 48  ASSESSMENT:   Giuseppe Hsu is a 80 y.o. male who presents with stage 2 L LE lymphedema following diagnosis and treatment for prostate cancer. Patient had previous treatment in the Lymphedema Clinic at [facility] 20 years ago but never returned for follow up treatment. The swelling has been fairly well maintained until a few months ago when the swelling in the L leg worsened. Patient's condition is complicated by prostate cancer with prostatectomy, bladder cancer, R rotator cuff surgery, peripheral neuropathy, chronic back issues with R foot drop, history of falls with recent concussion and broken wrist .Due to patient's cancer status, the lymphedema has the potential to significantly worsen over time if it is not managed well. Patient may benefit from a vaso-pneumatic device for management of swelling during the restorative phase of care. This care is medically necessary due to the infection risk with lymphedema and to improve functional activities. CDT is necessary to resolve swelling to allow patient to return to wearing normal clothes/footwear and prevent worsening of symptoms, such as infections and/or hospitalizations. Patient will be independent with home program strategies to allow improved ADL ability and mobility and to allow patient to return to greatest functional independence. Rehabilitation potential is considered to be Fair. Factors which may influence rehabilitation potential include history of prostate cancer,  prolonged swelling with failure to follow up in lymphedema clinic, history of falls, lack of consistent caregiver. Patient will benefit from 3 to 6 physical therapy visits over 12 weeks to optimize improvement in these areas.     PLAN OF CARE:   Recommendations and Planned Interventions: Manual lymph drainage/decongestive therapy, Multi-layer compression bandaging (short-stretch), Compression garment fitting/provision, Lymphedema therapeutic exercise, AROM/PROM/Strength/Coordination, Self-care training, Functional mobility training, Education in skin care and lymphedema precautions, Self-MLD education per home program, Self-bandaging education per home program and Caregiver education as needed    GOALS  Short term goals  Time frame: to be met by 5/24/21  1. Patient will demonstrate knowledge of signs/symptoms of infections/cellulitis and be independent in skin care to prevent cellulitis. 2.  Patient will demonstrate independence in lymphedema home program of therapeutic exercises to improve circulation and decongest limb to improve ADLs. 3.  Patient will tolerate multi-layer bandages (MLB) and show measureable decrease in limb volume and/or participate in the selection process to allow ordering of home compression system (daytime, nighttime garments and pump as needed). Long term goals  Time frame: to be met by 7/15/21  1. Patient will be independent with don/doff of compression system and use in order to prevent reaccumulation of fluid at discharge. 2.  Pt will be independent in self-MLD and show stable limb volumes showing decongestion and pt. ready for transition to independent restorative phase of lymphedema therapy. Patient has participated in goal setting and agrees to work toward plan of care. Patient was instructed to call if questions or concerns arise. Thank you for this referral.  Evelina Edwards, PT, CLT   Time Calculation: 90 mins    TREATMENT PLAN EFFECTIVE DATES:   4/19/2021 TO 7/15/2021  I have read the above plan of care for Puneet Acharya. I certify the above prescribed services are required by this patient and are medically necessary.   The above plan of care has been developed in conjunction with the lymphedema/physical therapist.       Physician Signature: ____________________________________Date:______________     Shan Carvajal MD

## 2021-04-28 ENCOUNTER — HOSPITAL ENCOUNTER (OUTPATIENT)
Dept: PHYSICAL THERAPY | Age: 86
Discharge: HOME | End: 2021-04-28
Payer: MEDICARE

## 2021-04-28 PROCEDURE — 97110 THERAPEUTIC EXERCISES: CPT

## 2021-04-28 PROCEDURE — 97140 MANUAL THERAPY 1/> REGIONS: CPT

## 2021-04-28 NOTE — PROGRESS NOTES
LYMPHEDEMA PT DAILY TREATMENT NOTE - [facility] 2-15    Patient Name: Chase Ayala  Date:2021  : 1935  [x]  Patient  Verified  Payor: Jorge Luis Todd / Plan: [facility] MEDICARE PART A & B / Product Type: Medicare /    In time: 12:00 pm Out time: 1:30 pm  Total Treatment Time (min): 90  Total Timed Codes (min): 90  1:1 Treatment Time ( [address] only): 90   Visit #:  2    Treatment Area: L LE Lymphedema, not elsewhere classified [I89.0]    SUBJECTIVE  Pain Level (0-10 scale): 0/10. Patient reports intermittent L wrist and back pain which will occasionally limit ability to perform ADL's. Any medication changes, allergies to medications, adverse drug reactions, diagnosis change, or new procedure performed?: [x] No    [] Yes (see summary sheet for update)  Subjective functional status/changes:   [x] No changes reported    Patient reports riding the stationary bicycle for 20 minutes for the past 3 out of 4 days. The swelling in the L leg has improved. Patient reports that he fractured his L wrist during a fall in January. The wrist continues to be painful at times. He has a follow up with his physician at [facility] today. OBJECTIVE    10 min Therapeutic Exercise:  [] Cesia Alcira Exercises [x] Remedial Lymphedema Exercise Program - continued education in the Active ROM routine. Patient encouraged to continue with a daily exercise program: participating in a walking program or riding the stationary bicycle up to 20 minutes per day. Discussed the role and benefit of compression garments with exercise and gradual progression of the exercise routine while monitoring for any increase in swelling. [] Axillary Web Exercise Program      [] Shoulder ROM Exercises   Rationale: Activate muscle pump to improve lymphatic fluid movement and decrease swelling to improve the patients ability to perform ADL and IADL skills and prevent worsening of swelling over time.       75 min Manual Therapy    Manual Lymphatic Drainage (MLD):  Area to decongest: L LE   Sequence used and effectiveness: Secondary sequence for L LE with/without trunk involvement. Patient was educated in the self MLD packet during MLD portion of the visit. Continued education in deep abdominal breathing techniques. Skin/wound care/debridement: Reviewed skin care principles:   Patient is performing daily skin care with a low ph lotion. Continued education in MLD techniques. Skin is intact. Upper/Lower Extremity Compression: Patient brought current garments to the clinic for inspection. The ready made thigh high of unknown brand and compression level was several years old and worn. The [employer] knee high 30-40 mmHg size 2 open toed was in adequate condition. Patient will be participating in automobile and airline travel in May and is interested in obtaining new garments for management of L LE swelling. Provided patient with samples of garments, including velcro, flat-knit, and ready made garments. Patient voiced interest in obtaining one pair of knee highs to have one knee high for wash and wear for the L LE. Since patient's insurance does not provide garment coverage, patient was donated one pair of [employer] knee highs 20-30 mmHg open toe in the size medium. Patient uses a donning aid and pair of donning gloves at home for ease of garment management. Patient is in need of new donning gloves and was provided with one pair of Sigvaris donning gloves in the size large from the vendor, [employer]. Education: Educated patient in compression garment donning and doffing. Glove use with donning. Daily wear schedule. Daily laundering. Garment lifespan. Educated patient to monitor for redness or pressure points on the skin. If new pain occurs they should contact their therapist.    Patient/family demonstrated donning and doffing with verbal cues not to overstretch knee high into the crease of the knee.       Rationale: decrease edema  and prevent worsening of swelling over time to reduce the risk of infection. 5   Vasopneumatic Device:   Discussed the role and benefit of the vaso-pneumatic device for management of swelling during the restorative phase of care. Patient voiced interest in possibly having a pump trial in a future visit. Will contact the vendor, [employer], to check insurance benefits. Rationale: To improve lymphatic fluid movement and decrease swelling to improve the patients ability to perform ADL and IADL skills and prevent worsening of swelling over time. With   [x] TE   [] TA   [x] MT   [] SC   [] other: Patient Education: [x] Review HEP - continued education in LE AROM routine     [x] MLD Patient Education Reviewed with patient, as well as demonstration and instruction during MLD portion of the session. Continued education in self MLD technique with bathing and skin care. Provided patient with the written instructions to follow. [] Progressed/Changed HEP based on:   [] positioning   [] Kinesiotape   [] Skin care   [] wound care   [x] other: Provided written handout on the airline travel considerations with lymphedema   Patient / caregiver re-demonstrated bandaging. [] Yes  [] No  Compression bandaging/garment precautions reviewed: [x] Yes  [] No     Other Objective/Functional Measures: Girth measurements were taken today for sizing of garments. R ankle: 19.5 cm  R calf:  31.4 cm  R thigh:44.6 cm    L ankle:  21.0 cm  L calf:  33.0 cm  L thigh: 45.1 cm    Pain Level (0-10 scale) post treatment: 0/10    ASSESSMENT/Changes in Function:   Patient reports having L LE lymphedema for greater than 20 years. He was seen in a Lymphedema Clinic many years ago and has managed the swelling fairly well until he suffered a fall a few months ago. Patient has initiated an exercise program since the evaluation in our clinic and reports that swelling in the L leg has improved.  He is in need of new compression products to wear daily and with high risk activities for management of swelling. Demonstrated various products that would be beneficial for him today. Patient is satisfied with ready made knee highs and a pair of knee highs was donated to patient this visit. Patient to travel to [place] and [address] [place] [address] in May and will continue with his home program of CDT and return to the clinic in 3 to 4 weeks for the effectiveness of the garments and his home program to be assessed. Patient will continue to benefit from skilled PT services to address swelling, instruct in home lymphedema management program, assess for additional compression needs,  and modify and progress therapeutic interventions to attain remaining goals. Patient will continue to benefit from skilled PT services to  address swelling, analyze compression product fit and use, instruct in home lymphedema management program and modify and progress therapeutic interventions to attain remaining goals. [x]  See Plan of Care  []  See progress note/recertification  []  See Discharge Summary         Progress towards goals / Updated goals:  Short term goals  Time frame: to be met by 5/24/21  1. Patient will demonstrate knowledge of signs/symptoms of infections/cellulitis and be independent in skin care to prevent cellulitis. Goal in progress. 2.  Patient will demonstrate independence in lymphedema home program of therapeutic exercises to improve circulation and decongest limb to improve ADLs. Goal in progress. 3.  Patient will tolerate multi-layer bandages (MLB) and show measureable decrease in limb volume and/or participate in the selection process to allow ordering of home compression system (daytime, nighttime garments and pump as needed). Will continue to assess for garment needs. Patient may be interested in obtaining a pump for home use. Goal in progress.      Long term goals  Time frame: to be met by 7/15/21  1.   Patient will be independent with don/doff of compression system and use in order to prevent reaccumulation of fluid at discharge. Goal in progress. 2.  Pt will be independent in self-MLD and show stable limb volumes showing decongestion and pt. ready for transition to independent restorative phase of lymphedema therapy.     PLAN  [x]  Upgrade activities as tolerated     [x]  Continue plan of care  []  Update interventions per flow sheet       []  Discharge due to:_  []  Other:_      Evelina Edwards PT, CLT 4/28/2021

## 2021-04-29 DIAGNOSIS — I63.231 CEREBRAL INFARCTION INVOLVING RIGHT CAROTID ARTERY (HCC): ICD-10-CM

## 2021-04-29 DIAGNOSIS — H91.13 PRESBYCUSIS OF BOTH EARS: ICD-10-CM

## 2021-04-29 DIAGNOSIS — M96.1 CERVICAL POST-LAMINECTOMY SYNDROME: ICD-10-CM

## 2021-04-29 DIAGNOSIS — M54.16 LUMBAR BACK PAIN WITH RADICULOPATHY AFFECTING LEFT LOWER EXTREMITY: ICD-10-CM

## 2021-04-29 DIAGNOSIS — G60.8 IDIOPATHIC SMALL AND LARGE FIBER SENSORY NEUROPATHY: ICD-10-CM

## 2021-04-29 DIAGNOSIS — R41.3 MEMORY CHANGE: ICD-10-CM

## 2021-04-29 DIAGNOSIS — E11.42 DIABETIC PERIPHERAL NEUROPATHY ASSOCIATED WITH TYPE 2 DIABETES MELLITUS (HCC): ICD-10-CM

## 2021-04-29 DIAGNOSIS — I67.89 CEREBRAL MICROVASCULAR DISEASE: ICD-10-CM

## 2021-04-29 DIAGNOSIS — M96.1 LUMBAR POST-LAMINECTOMY SYNDROME: ICD-10-CM

## 2021-04-29 DIAGNOSIS — H53.462 LEFT HOMONYMOUS HEMIANOPSIA: ICD-10-CM

## 2021-04-29 DIAGNOSIS — I65.23 BILATERAL CAROTID ARTERY STENOSIS: ICD-10-CM

## 2021-04-29 RX ORDER — GABAPENTIN 600 MG/1
TABLET ORAL
Qty: 270 TAB | Refills: 0 | Status: SHIPPED | OUTPATIENT
Start: 2021-04-29 | End: 2021-07-12 | Stop reason: SDUPTHER

## 2021-04-30 ENCOUNTER — TRANSCRIBE ORDER (OUTPATIENT)
Dept: SCHEDULING | Age: 86
End: 2021-04-30

## 2021-04-30 DIAGNOSIS — M25.832 ULNAR IMPACTION SYNDROME, LEFT: Primary | ICD-10-CM

## 2021-04-30 DIAGNOSIS — M24.132: ICD-10-CM

## 2021-05-08 ENCOUNTER — HOSPITAL ENCOUNTER (OUTPATIENT)
Dept: MRI IMAGING | Age: 86
Discharge: HOME | End: 2021-05-08
Attending: ORTHOPAEDIC SURGERY
Payer: MEDICARE

## 2021-05-08 DIAGNOSIS — M24.132: ICD-10-CM

## 2021-05-08 DIAGNOSIS — M25.832 ULNAR IMPACTION SYNDROME, LEFT: ICD-10-CM

## 2021-05-08 PROCEDURE — 73221 MRI JOINT UPR EXTREM W/O DYE: CPT

## 2021-05-19 ENCOUNTER — TRANSCRIBE ORDER (OUTPATIENT)
Dept: SCHEDULING | Age: 86
End: 2021-05-19

## 2021-05-19 DIAGNOSIS — M54.50 CHRONIC LOW BACK PAIN: ICD-10-CM

## 2021-05-19 DIAGNOSIS — M47.816 LUMBAR SPONDYLOSIS: Primary | ICD-10-CM

## 2021-05-19 DIAGNOSIS — G89.29 CHRONIC LOW BACK PAIN: ICD-10-CM

## 2021-05-19 DIAGNOSIS — M46.1 SACROILIITIS, NOT ELSEWHERE CLASSIFIED (HCC): ICD-10-CM

## 2021-05-19 DIAGNOSIS — M51.36 DDD (DEGENERATIVE DISC DISEASE), LUMBAR: ICD-10-CM

## 2021-05-25 ENCOUNTER — HOSPITAL ENCOUNTER (OUTPATIENT)
Dept: PHYSICAL THERAPY | Age: 86
Discharge: HOME | End: 2021-05-25
Payer: MEDICARE

## 2021-05-25 PROCEDURE — 97110 THERAPEUTIC EXERCISES: CPT

## 2021-05-25 PROCEDURE — 97140 MANUAL THERAPY 1/> REGIONS: CPT

## 2021-05-25 NOTE — PROGRESS NOTES
LYMPHEDEMA DISCHARGE NOTE - [facility] 2-15    Patient Name: Krishan Traore  Date:2021  : 1935  [x]  Patient  Verified  Payor: Arminsoto Richter / Plan: [place] & B / Product Type: Medicare /    In time: 10:00 am Out time: 11:15 m  Total Treatment Time (min): 75  Total Timed Codes (min): 75  1:1 Treatment Time ( [address] only): 75  Visit #:  3    Treatment Area: L LE Lymphedema, not elsewhere classified [I89.0]    SUBJECTIVE  Pain Level (0-10 scale) 0/10  Any medication changes, allergies to medications, adverse drug reactions, diagnosis change, or new procedure performed?: [x] No    [] Yes (see summary sheet for update)  Subjective functional status/changes:   [x] No changes reported    Patient reports no increase in L leg swelling with participation in airline travel to [facility] and by automobile to [place] since his last visit in the clinic. OBJECTIVE    10 min Therapeutic Exercise:  [] Arti [employer] Exercises [x] Remedial Lymphedema Exercise Program - Patient had questions regarding the LE  Active ROM routine and continued education in the routine this visit. Patient encouraged to continue with a daily exercise program: participating in a walking program or riding the stationary bicycle up to 20 minutes per day. Discussed the role and benefit of compression garments with exercise and gradual progression of the exercise routine while monitoring for any increase in swelling. [] Axillary Web Exercise Program      [] Shoulder ROM Exercises   Rationale: Activate muscle pump to improve lymphatic fluid movement and decrease swelling to improve the patients ability to perform ADL and IADL skills and prevent worsening of swelling over time. 45 min Manual Therapy    Manual Lymphatic Drainage (MLD):  Area to decongest: L LE   Sequence used and effectiveness: Secondary sequence for L LE with/without trunk involvement.  Patient has been educated in the self MLD packet and continued education in performing MLD daily with bathing and skin care. Continued education in deep abdominal breathing techniques. Skin/wound care/debridement: Reviewed skin care principles: Patient presents with a wound (1.0 cm X 1.0 cm) on the L anterior lower leg and reports he bumped into something at the airport 3 days ago. The wound is superficial without scabbing present. No drainage or odor noted. Applied A and D ointment and covered with bandage. Discussed the importance of keeping the wound covered and continued education in signs and symptoms of infection. Patient is performing skin care with a low ph lotion most days. Upper/Lower Extremity Compression: Patient arrives to the clinic with L LE compression product in place. Patient is wearing a [employer] thigh high 30-40 mmHg size 2 open toed which appears to fit well and is in adequate condition. Patient is not interested in obtaining additional garments at this time. Measured patient to verify the sizing of garments and provided patient with sizing and vendor information to order new compression products when ready. Discussed the life expectancy of garments, the benefit of a closed toe option, and the option of decreasing compression level to 20-30 mmHg for ease of donning/doffing since percentage difference L LE > R LE is low. Discussed purchasing a pair of compression bike shorts from a local sporting goods store to prevent slippage of the thigh high. Patient was donated one pair of [employer] knee highs 20-30 mmHg open toe in the size medium during a previous visit. Patient uses a pair of donning gloves at home for ease of garment management. Patient is in need of new donning gloves and requests one pair of Sig[employer]is donning gloves in the size large and one pair in the size medium and provided payment to the vendor, [employer]. Education: Educated patient in compression garment donning and doffing. Glove use with donning.   Daily wear schedule. Daily laundering. Garment lifespan. Educated patient to monitor for redness or pressure points on the skin. If new pain occurs they should contact their therapist.    Patient/family demonstrated donning and doffing with verbal cues not to overstretch knee high into the crease of the knee. Rationale: decrease edema  and prevent worsening of swelling over time to reduce the risk of infection. 20   Vasopneumatic Device:   Discussed the role and benefit of the vaso-pneumatic device during a previous visit and patient voiced interest in having a pump trial. Patient was positioned in supine with L LE elevated on one pillow. Patient tolerated a 20 minute trial of the Entre Pump to the L LE on the low setting during the visit today. Patient prefers not to obtain a pump for home use at this time but may be interested in the future. Rationale: To improve lymphatic fluid movement and decrease swelling to improve the patients ability to perform ADL and IADL skills and prevent worsening of swelling over time. With   [x] TE   [] TA   [x] MT   [] SC   [x] other:  Patient Education: [x] Review HEP - continued education in LE AROM routine per patient request.    [x] MLD Patient Education: Continued education in self MLD technique with bathing and skin care. Provided patient with the written instructions to follow. [] Progressed/Changed HEP based on:   [] positioning   [] Kinesiotape   [x] Skin care   [x] wound care   [x] other: Basic pump trial, sizing of garments and ordering process when ready  Patient / caregiver re-demonstrated bandaging. [] Yes  [] No  Compression bandaging/garment precautions reviewed: [x] Yes  [] No     Other Objective/Functional Measures: Full LE volumetric measurements:  L LE: 7,502.57 ml today vs 7,846.40 ml on 4/19/21. R LE: 7,299.14 ml today vs 7,531.94 ml on 4/19/21. Percentage difference 2.79% today vs 4.18% on 4/19/21.     Pain Level (0-10 scale) post treatment: 0/10    ASSESSMENT/Changes in Function:   Full LE volumes taken today reveal a loss of 344 ml in the involved extremity and a loss of 233 ml in the uninvolved extremity since evaluation 4/19/21 despite recent travel by air and automobile. Patient is performing his home program to the best of his ability. He performs skin care and wears compression products most days. He is active but would benefit from daily participation in an exercise program. He tolerated a pump trial during the visit today but prefers not to obtain a pump for home use unless swelling in the leg gets worse. Patient's insurance does not provide coverage for compression garments and patient is not interested in obtaining additional products at this time but has the needed information when ready. He has met all goals set on evaluation and is to continue with his home program during the restorative phase of care. Patient will return to the clinic in 6 to 12 months or sooner if needed for assessment of swelling and garment needs. Progress towards goals / Updated goals:  Short term goals  Time frame: to be met by 5/24/21  1. Patient will demonstrate knowledge of signs/symptoms of infections/cellulitis and be independent in skin care to prevent cellulitis. Patient is performing skin care most days and has been educated in wound care and signs and symptoms of cellulitis. Goal met 5/25/21. 2.  Patient will demonstrate independence in lymphedema home program of therapeutic exercises to improve circulation and decongest limb to improve ADLs. Patient remains active in the house and yard. Discussed the role and benefit of compression garments with exercise. Encouraged patient to participate in a daily exercise program. Goal met 5/25/21.   3.  Patient will tolerate multi-layer bandages (MLB) and show measureable decrease in limb volume and/or participate in the selection process to allow ordering of home compression system (daytime, nighttime garments and pump as needed). Donated patient one pair of knee highs during a previous visit and patient has one pair of thigh highs in adequate condition. Patient has the sizing and vendor information when ready to order new garments. Patient had a pump trial during the visit today and is not interested in obtaining a pump at this time. Goal met 5/25/21.     Long term goals  Time frame: to be met by 7/15/21  1. Patient will be independent with don/doff of compression system and use in order to prevent reaccumulation of fluid at discharge. Patient is independent with management of garments. Goal met 5/25/21. 2.  Pt will be independent in self-MLD and show stable limb volumes showing decongestion and pt. ready for transition to independent restorative phase of lymphedema therapy. Goal met 5/25/21.     PLAN  [x]  Upgrade activities as tolerated     [x]  Continue plan of care  []  Update interventions per flow sheet       []  Discharge due to:_  []  Other:_      Casey Fox, PT, CLT 5/25/2021

## 2021-05-27 ENCOUNTER — HOSPITAL ENCOUNTER (OUTPATIENT)
Dept: MRI IMAGING | Age: 86
Discharge: HOME | End: 2021-05-27
Attending: NURSE PRACTITIONER
Payer: MEDICARE

## 2021-05-27 DIAGNOSIS — M47.816 LUMBAR SPONDYLOSIS: ICD-10-CM

## 2021-05-27 DIAGNOSIS — G89.29 CHRONIC LOW BACK PAIN: ICD-10-CM

## 2021-05-27 DIAGNOSIS — M51.36 DDD (DEGENERATIVE DISC DISEASE), LUMBAR: ICD-10-CM

## 2021-05-27 DIAGNOSIS — M46.1 SACROILIITIS, NOT ELSEWHERE CLASSIFIED (HCC): ICD-10-CM

## 2021-05-27 DIAGNOSIS — M54.50 CHRONIC LOW BACK PAIN: ICD-10-CM

## 2021-05-27 PROCEDURE — 72148 MRI LUMBAR SPINE W/O DYE: CPT

## 2021-06-29 ENCOUNTER — TELEPHONE (OUTPATIENT)
Dept: NEUROLOGY | Age: 86
End: 2021-06-29

## 2021-06-29 NOTE — LETTER
6/29/2021 3:23 PM    [facility] [id number]      Current Outpatient Medications on File Prior to Visit   Medication Sig    gabapentin (NEURONTIN) 600 mg tablet TAKE ONE TABLET BY MOUTH THREE TIMES A DAY    cyanocobalamin 1,000 mcg tablet Take 1,000 mcg by mouth daily.  DULoxetine (CYMBALTA) 30 mg capsule TAKE ONE CAPSULE BY MOUTH DAILY    cholecalciferol (VITAMIN D3) (1000 Units /25 mcg) tablet Take 1,000 Units by mouth daily.  tadalafil (CIALIS) 20 mg tablet Take 20 mg by mouth as needed for Erectile Dysfunction.  potassium 99 mg tablet Take 99 mg by mouth nightly.  magnesium 250 mg tab Take 1 Tab by mouth daily.  RESTASIS 0.05 % ophthalmic emulsion Administer 1 Drop to both eyes every twelve (12) hours.  celecoxib (CELEBREX) 200 mg capsule Take 200 mg by mouth daily.  lisinopril (PRINIVIL, ZESTRIL) 10 mg tablet Take 10 mg by mouth nightly.  traZODone (DESYREL) 50 mg tablet Take 100 mg by mouth nightly.  copper gluconate 2 mg tab tablet Take 2 mg by mouth daily.  FOLIC ACID PO Take 1 Tab by mouth daily.  SALMON OIL/OMEGA-3 FATTY ACIDS (SALMON OIL-1000 PO) Take 1 Tab by mouth daily.  SACCHAROMYCES BOULARDII (PROBIOTIC, S.BOULARDII, PO) Take 1 Tab by mouth daily.  omeprazole (PRILOSEC) 40 mg capsule Take 40 mg by mouth daily.  pyridoxine (VITAMIN B-6) 200 mg tablet Take 200 mg by mouth daily.  nicotinic acid (NIACIN) 500 mg tablet Take 500 mg by mouth nightly.  GLUCOSAMINE HCL/CHONDR WELDON A NA (GLUCOSAMINE-CHONDROITIN) 750-600 mg Tab Take 2 Tabs by mouth daily.  multivitamin (ONE A DAY) tablet Take 1 Tab by mouth daily. No current facility-administered medications on file prior to visit.              Sincerely,      Tara Silva MD

## 2021-06-29 NOTE — TELEPHONE ENCOUNTER
----- Message from [facility] sent at 6/29/2021 10:50 AM EDT -----  Regarding: /Telephone     Caller's first and last name:Pt  Reason for call:office notes request  Callback required yes/no and why:Yes  Best contact number(s):[phone]  Details to clarify the request:Pt is approved to go to the [facility] and he will like his office notes to be sent to the [place] and also all his prescriptions he is prescribed.  Pt also provided a fax number [phone] ATTN: Carine Simons PA-C.

## 2021-07-09 ENCOUNTER — TELEPHONE (OUTPATIENT)
Dept: NEUROLOGY | Age: 86
End: 2021-07-09

## 2021-07-09 NOTE — TELEPHONE ENCOUNTER
Pt is now being seen the [facility] and would like for all prescriptions to be sent over to them going forward. Pt is requesting new prescriptions be sent over for all of his medications.

## 2021-07-12 DIAGNOSIS — G60.8 IDIOPATHIC SMALL AND LARGE FIBER SENSORY NEUROPATHY: ICD-10-CM

## 2021-07-12 DIAGNOSIS — H53.462 LEFT HOMONYMOUS HEMIANOPSIA: ICD-10-CM

## 2021-07-12 DIAGNOSIS — I63.231 CEREBRAL INFARCTION INVOLVING RIGHT CAROTID ARTERY (HCC): ICD-10-CM

## 2021-07-12 DIAGNOSIS — M96.1 LUMBAR POST-LAMINECTOMY SYNDROME: ICD-10-CM

## 2021-07-12 DIAGNOSIS — M54.16 LUMBAR BACK PAIN WITH RADICULOPATHY AFFECTING LEFT LOWER EXTREMITY: ICD-10-CM

## 2021-07-12 DIAGNOSIS — M96.1 CERVICAL POST-LAMINECTOMY SYNDROME: ICD-10-CM

## 2021-07-12 DIAGNOSIS — H91.13 PRESBYCUSIS OF BOTH EARS: ICD-10-CM

## 2021-07-12 DIAGNOSIS — R41.3 MEMORY CHANGE: ICD-10-CM

## 2021-07-12 DIAGNOSIS — I65.23 BILATERAL CAROTID ARTERY STENOSIS: ICD-10-CM

## 2021-07-12 DIAGNOSIS — E11.42 DIABETIC PERIPHERAL NEUROPATHY ASSOCIATED WITH TYPE 2 DIABETES MELLITUS (HCC): ICD-10-CM

## 2021-07-12 DIAGNOSIS — I67.89 CEREBRAL MICROVASCULAR DISEASE: ICD-10-CM

## 2021-07-12 RX ORDER — GABAPENTIN 600 MG/1
TABLET ORAL
Qty: 270 TABLET | Refills: 1 | Status: SHIPPED | OUTPATIENT
Start: 2021-07-12

## 2021-07-13 ENCOUNTER — TELEPHONE (OUTPATIENT)
Dept: NEUROLOGY | Age: 86
End: 2021-07-13

## 2021-07-13 NOTE — TELEPHONE ENCOUNTER
[facility], can you mail this to the patient, the prescription I printed off for gabapentin

## 2021-07-13 NOTE — TELEPHONE ENCOUNTER
Pt stated that dr Song Huerta called him last night 7-12 asking what pharmacy should his prescriptions be sent to. The pt is now getting his prescriptions at [facility], [facility], I was informed that [facility] for the [facility] need to be [facility] over to the facility.

## 2021-07-15 NOTE — TELEPHONE ENCOUNTER
Placed note in patients chart of prescriptions needing to be sent to [facility].

## 2021-07-16 RX ORDER — DULOXETIN HYDROCHLORIDE 30 MG/1
CAPSULE, DELAYED RELEASE ORAL
Qty: 90 CAPSULE | Refills: 3 | Status: SHIPPED | OUTPATIENT
Start: 2021-07-16

## 2021-07-20 NOTE — PERIOP NOTES
[facility]  Ambulatory Surgery Unit  Pre-operative Instructions    Procedure Date  7/28            Tentative Arrival Time tbd      1. On the day of your procedure, please report to the Ambulatory Surgery Unit Registration Desk and sign in at your designated time. The Ambulatory Surgery Unit is located in [facility] on the [facility] side of the [facility] across from the [facility]. Please have all of your health insurance cards and a photo ID. 2. You must have someone with you to drive you home as directed by your surgeon. 3. You may have a light breakfast and take normal morning medications. 4. We recommend you do not drink any alcoholic beverages for 24 hours before and after your procedure. 5. Contact your surgeons office for instructions on the following medications: non-steroidal anti-inflammatory drugs (i.e. Advil, Aleve), vitamins, and supplements. (Some surgeons will want you to stop these medications prior to surgery and others may allow you to take them)   **If you are currently taking Plavix, Coumadin, Aspirin and/or other blood-thinning agents, contact your surgeon for instructions. ** Your surgeon will partner with the physician prescribing these medications to determine if it is safe to stop or if you need to continue taking. Please do not stop taking these medications without instructions from your surgeon. 6. In an effort to help prevent surgical site infection, we ask that you shower with an anti-bacterial soap (i.e. Dial or Safeguard) on the morning of your procedure. Do not apply any lotions, powders, or deodorants after showering. 7. Wear comfortable clothes. Wear glasses instead of contacts. Do not bring any jewelry or money (other than copays or fees as instructed). Do not wear make-up, particularly mascara, the morning of your procedure. Wear your hair loose or down, no ponytails, buns, lupillo pins or clips.  All body piercings must be removed. 8. You should understand that if you do not follow these instructions your procedure may be cancelled. If your physical condition changes (i.e. fever, cold or flu) please contact your surgeon as soon as possible. 9. It is important that you be on time. If a situation occurs where you may be late, or if you have any questions or problems, please call [phone].    10. Your procedure time may be subject to change. You will receive a phone call the day prior to confirm your arrival time. I understand a pre-operative phone call will be made to verify my procedure time. In the event that I am not available, I give permission for a message to be left on my answering service and/or with another person?       yes    Reviewed by phone with pt, verbalized understanding     ___________________      ___________________      ___________________  (Signature of Patient)          (Witness)                   (Date and Time)

## 2021-07-28 ENCOUNTER — HOSPITAL ENCOUNTER (OUTPATIENT)
Age: 86
Setting detail: OUTPATIENT SURGERY
Discharge: HOME | End: 2021-07-28
Attending: PHYSICAL MEDICINE & REHABILITATION | Admitting: PHYSICAL MEDICINE & REHABILITATION
Payer: MEDICARE

## 2021-07-28 ENCOUNTER — APPOINTMENT (OUTPATIENT)
Dept: GENERAL RADIOLOGY | Age: 86
End: 2021-07-28
Attending: PHYSICAL MEDICINE & REHABILITATION
Payer: MEDICARE

## 2021-07-28 VITALS
WEIGHT: 150 LBS | TEMPERATURE: 98.3 F | OXYGEN SATURATION: 94 % | SYSTOLIC BLOOD PRESSURE: 115 MMHG | HEIGHT: 67 IN | BODY MASS INDEX: 23.54 KG/M2 | HEART RATE: 64 BPM | DIASTOLIC BLOOD PRESSURE: 66 MMHG | RESPIRATION RATE: 16 BRPM

## 2021-07-28 PROCEDURE — 76210000046 HC AMBSU PH II REC FIRST 0.5 HR: Performed by: PHYSICAL MEDICINE & REHABILITATION

## 2021-07-28 PROCEDURE — 77030003665 HC NDL SPN BBMI -A: Performed by: PHYSICAL MEDICINE & REHABILITATION

## 2021-07-28 PROCEDURE — 72020 X-RAY EXAM OF SPINE 1 VIEW: CPT

## 2021-07-28 PROCEDURE — 74011250636 HC RX REV CODE- 250/636: Performed by: PHYSICAL MEDICINE & REHABILITATION

## 2021-07-28 PROCEDURE — 74011000250 HC RX REV CODE- 250: Performed by: PHYSICAL MEDICINE & REHABILITATION

## 2021-07-28 PROCEDURE — 76000 FLUOROSCOPY <1 HR PHYS/QHP: CPT

## 2021-07-28 PROCEDURE — 74011000636 HC RX REV CODE- 636: Performed by: PHYSICAL MEDICINE & REHABILITATION

## 2021-07-28 PROCEDURE — 2709999900 HC NON-CHARGEABLE SUPPLY: Performed by: PHYSICAL MEDICINE & REHABILITATION

## 2021-07-28 PROCEDURE — 76030000002 HC AMB SURG OR TIME FIRST 0.: Performed by: PHYSICAL MEDICINE & REHABILITATION

## 2021-07-28 RX ORDER — LIDOCAINE HYDROCHLORIDE 20 MG/ML
7 INJECTION, SOLUTION INFILTRATION; PERINEURAL ONCE
Status: COMPLETED | OUTPATIENT
Start: 2021-07-28 | End: 2021-07-28

## 2021-07-28 RX ORDER — BUPIVACAINE HYDROCHLORIDE 5 MG/ML
5 INJECTION, SOLUTION EPIDURAL; INTRACAUDAL ONCE
Status: DISCONTINUED | OUTPATIENT
Start: 2021-07-28 | End: 2021-07-28 | Stop reason: HOSPADM

## 2021-07-28 RX ORDER — METHYLPREDNISOLONE ACETATE 40 MG/ML
80 INJECTION, SUSPENSION INTRA-ARTICULAR; INTRALESIONAL; INTRAMUSCULAR; SOFT TISSUE ONCE
Status: COMPLETED | OUTPATIENT
Start: 2021-07-28 | End: 2021-07-28

## 2021-07-28 NOTE — PERIOP NOTES
Neuro:  Push/Pull assessment:     LUE Response: strong   LLE Response: strong   RUE Response: strong   RLE Response: mod

## 2021-07-28 NOTE — OP NOTES
Epidural Steroid Injection Operative Report    Indications: This is a 80 y.o. male who presents with low back pain. He was positive for LS DDD. The patient was admitted for surgery as conservative measures have failed. Date of Surgery: 7/28/2021    Preoperative Diagnosis: LS DDD    Postoperative Diagnosis: LS DDD    Surgeon(s) and Role:     * Tre Acosta MD - Primary     Procedure:  Procedure(s):  BILATERAL L3 TRANSFORAMINAL  EPIDURAL STEROID INJECTION    Procedure in Detail:  After appropriate informed consent was obtained, the patient was taken to the operating suite and placed in the prone position on the operating table on appropriate padding. The LS region was prepped and draped in the usual sterile fashion. Intraoperative fluoroscopy was used to localize the LS spine. The skin was infiltrated with 2% lidocaine. An 22-g needle was advanced into the Simone L3 neuroforamen under fluoroscopic guidance. A small amount of contrast was injected into the epidural space, confirming appropriate needle placement on fluoroscopy. Next, 2ml of 2% lidocaine and 80mg of Depo-Medrol were injected. The needle was removed from the patient. The patient was then turned back into the supine position on the stretcher and was taken to the Recovery Room in stable condition.     Estimated Blood Loss:  none     Specimens: None       Drains: None          Complications:  None    Signed By: Svetlana Allison MD                        July 28, 2021

## 2021-07-28 NOTE — DISCHARGE INSTRUCTIONS
Dr. Yuniel Ibrahim Discharge Instructions  Transforaminal Epidural Steroid Injection/ Selective Nerve Block    You had a transforaminal epidural steroid injection/ selective nerve block today. You will probably have some numbness, and possibly weakness, in your leg for the next 6 to 8 hours. The steroids will slowly become effective, reducing your pain, over the next 2 weeks. You should begin feeling better after a few days, but it may take up to 2 weeks to notice the difference. The benefit you get from your injection will last a variable amount of time, depending on the severity of your lumbar spine problem.  Pain: Most people do not have any increase in pain after this injection. However, you might experience some soreness in your low back. If this happens, putting an ice pack over the sore area will help.  Bandage: You will have a small bandage covering the site of the injection. You may remove it once you get home.  Restrictions: Someone should drive you home after the injection. After that, you have no restrictions. You need to be careful while walking, as you may still have some numbness or weakness in your leg. You may resume your normal level of activity. You may take a shower or bath, and you may eat normally. You should continue your current exercises and/or therapy routine.   Medications: Continue your current medications as prescribed. If your pain decreases, you may reduce the amount of your pain medicines. If you stopped taking anticoagulants or blood-thinners before the injection, start them tomorrow. If you have diabetes, your blood sugar may be elevated for a few days. Call your primary doctor with any questions.   Call Dr. Yuniel Ibrahim at [phone] if you experience:   Fever (101 degrees Fahrenheit or greater)   Nausea or vomiting   Headache unrelieved by your normal pain medicine   Redness or swelling at the injection site that lasts more than 1 day   New numbness, tingling, weakness, or pain that you didnt have before the injection    Follow-up appointment:   If still having pain in 1-2 weeks, call office at [phone] for a follow up appointment. DISCHARGE SUMMARY from Nurse    The following personal items collected during your admission are returned to you:   Dental Appliance:    Vision: Visual Aid: None  Hearing Aid:    Jewelry:    Clothing:    Other Valuables:    Valuables sent to safe: If you were given prescriptions, please review the written information on prescribed medications. · You will receive a Post Operative Call from one of the Recovery Room Nurses on the day after your surgery to check on you. It is very important for us to know how you are recovering after your surgery. · You may receive an e-mail or letter in the mail from [facility] regarding your experience with us in the Ambulatory Surgery Unit. Your feedback is valuable to us and we appreciate your participation in the survey. If you have not had your influenza or pneumococcal vaccines, please follow up with your primary care physician. The discharge information has been reviewed with the patient. The patient verbalized understanding.

## 2021-07-28 NOTE — PERIOP NOTES
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 Stu Perez:  Intra-procedure medications, procedure, and allergies reviewed        Recommendation:    Discharge patient home after discharge instructions reviewed with patient. Rest until local has worn off.

## 2021-07-28 NOTE — H&P
Procedural Case Note    7/28/2021    (9:16 AM)    Loco Nugent    1935   (80 y.o.)    [id number]    CC:  pain    ROS:   Complete ROS obtained, no CP, no SOB, no N or V    PMH:     Past Medical History:   Diagnosis Date    Arthritis     osteoarthritis    Basal cell carcinoma (BCC) of skin of nose     Bladder cancer (HCC)     Chronic pain     back    Diabetes (HCC)     borderline    Foot drop, right     GERD (gastroesophageal reflux disease)     Hypertension     Insomnia     Lymphedema of left leg     Neuropathy     in both feet    Prostate cancer ([facility] [address]     Selenium deficiency     Staph infection     Both eyes       ALLERGIES:     Allergies   Allergen Reactions    Sulfa (Sulfonamide Antibiotics) Hives       MEDS:     No current facility-administered medications for this encounter. Visit Vitals  Ht 5' 7\" (1.702 m)   Wt 68 kg (150 lb)   BMI 23.49 kg/m²     PE:  Gen: NAD  Head: normocephalic  Heart: RRR  Lungs: CTA arelis  Abd: NT, ND, soft  Neuro: awake and alert  Skin: intact    IMPRESSION:   LS DDD    Note:  The clinical status was discussed in detail with the patient. The procedure was again discussed and described in detail. All understand and accept the planned procedure and risks; reject other forms of treatment. All questions are answered.     Kalyan Powell MD

## 2021-07-28 NOTE — PERIOP NOTES
Pt has strong left plantar and dorsi flexion has foot drop on right    Pt able to stand and hold weight. No swelling or bleeding at injection sites    1013 Discharged to car without incident.  Stressed to use cane to get in house and at home today

## 2021-11-01 NOTE — PERIOP NOTES
[facility]  Ambulatory Surgery Unit  Pre-operative Instructions    Procedure Date  11/9            Tentative Arrival Time tbd      1. On the day of your procedure, please report to the Ambulatory Surgery Unit Registration Desk and sign in at your designated time. The Ambulatory Surgery Unit is located in [facility] on the [facility] side of the [facility] across from the [address] [place] [address]. Please have all of your health insurance cards and a photo ID. 2. You must have someone with you to drive you home as directed by your surgeon. 3. You may have a light breakfast and take normal morning medications. 4. We recommend you do not drink any alcoholic beverages for 24 hours before and after your procedure. 5. Contact your surgeons office for instructions on the following medications: non-steroidal anti-inflammatory drugs (i.e. Advil, Aleve), vitamins, and supplements. (Some surgeons will want you to stop these medications prior to surgery and others may allow you to take them)   **If you are currently taking Plavix, Coumadin, Aspirin and/or other blood-thinning agents, contact your surgeon for instructions. ** Your surgeon will partner with the physician prescribing these medications to determine if it is safe to stop or if you need to continue taking. Please do not stop taking these medications without instructions from your surgeon. 6. In an effort to help prevent surgical site infection, we ask that you shower with an anti-bacterial soap (i.e. Dial or Safeguard) on the morning of your procedure. Do not apply any lotions, powders, or deodorants after showering. 7. Wear comfortable clothes. Wear glasses instead of contacts. Do not bring any jewelry or money (other than copays or fees as instructed). Do not wear make-up, particularly mascara, the morning of your procedure. Wear your hair loose or down, no ponytails, buns, lupillo pins or clips.  All body piercings must be removed. 8. You should understand that if you do not follow these instructions your procedure may be cancelled. If your physical condition changes (i.e. fever, cold or flu) please contact your surgeon as soon as possible. 9. It is important that you be on time. If a situation occurs where you may be late, or if you have any questions or problems, please call [phone].    10. Your procedure time may be subject to change. You will receive a phone call the day prior to confirm your arrival time. I understand a pre-operative phone call will be made to verify my procedure time. In the event that I am not available, I give permission for a message to be left on my answering service and/or with another person?       yes    Reviewed by phone with pt, verbalized understanding.     ___________________      ___________________      ___________________  (Signature of Patient)          (Witness)                   (Date and Time)

## 2021-11-09 ENCOUNTER — HOSPITAL ENCOUNTER (OUTPATIENT)
Age: 86
Setting detail: OUTPATIENT SURGERY
Discharge: HOME | End: 2021-11-09
Attending: PHYSICAL MEDICINE & REHABILITATION | Admitting: PHYSICAL MEDICINE & REHABILITATION
Payer: MEDICARE

## 2021-11-09 ENCOUNTER — APPOINTMENT (OUTPATIENT)
Dept: GENERAL RADIOLOGY | Age: 86
End: 2021-11-09
Attending: PHYSICAL MEDICINE & REHABILITATION
Payer: MEDICARE

## 2021-11-09 VITALS
BODY MASS INDEX: 23.39 KG/M2 | HEART RATE: 76 BPM | SYSTOLIC BLOOD PRESSURE: 153 MMHG | HEIGHT: 67 IN | TEMPERATURE: 97.9 F | OXYGEN SATURATION: 99 % | WEIGHT: 149 LBS | RESPIRATION RATE: 16 BRPM | DIASTOLIC BLOOD PRESSURE: 85 MMHG

## 2021-11-09 PROCEDURE — 77030003665 HC NDL SPN BBMI -A: Performed by: PHYSICAL MEDICINE & REHABILITATION

## 2021-11-09 PROCEDURE — 74011000636 HC RX REV CODE- 636: Performed by: PHYSICAL MEDICINE & REHABILITATION

## 2021-11-09 PROCEDURE — 74011250636 HC RX REV CODE- 250/636: Performed by: PHYSICAL MEDICINE & REHABILITATION

## 2021-11-09 PROCEDURE — 72020 X-RAY EXAM OF SPINE 1 VIEW: CPT

## 2021-11-09 PROCEDURE — 76210000046 HC AMBSU PH II REC FIRST 0.5 HR: Performed by: PHYSICAL MEDICINE & REHABILITATION

## 2021-11-09 PROCEDURE — 76000 FLUOROSCOPY <1 HR PHYS/QHP: CPT

## 2021-11-09 PROCEDURE — 2709999900 HC NON-CHARGEABLE SUPPLY: Performed by: PHYSICAL MEDICINE & REHABILITATION

## 2021-11-09 PROCEDURE — 76030000002 HC AMB SURG OR TIME FIRST 0.: Performed by: PHYSICAL MEDICINE & REHABILITATION

## 2021-11-09 PROCEDURE — 74011000250 HC RX REV CODE- 250: Performed by: PHYSICAL MEDICINE & REHABILITATION

## 2021-11-09 RX ORDER — METHYLPREDNISOLONE ACETATE 40 MG/ML
80 INJECTION, SUSPENSION INTRA-ARTICULAR; INTRALESIONAL; INTRAMUSCULAR; SOFT TISSUE ONCE
Status: COMPLETED | OUTPATIENT
Start: 2021-11-09 | End: 2021-11-09

## 2021-11-09 RX ORDER — BUPIVACAINE HYDROCHLORIDE 5 MG/ML
10 INJECTION, SOLUTION EPIDURAL; INTRACAUDAL ONCE
Status: DISCONTINUED | OUTPATIENT
Start: 2021-11-09 | End: 2021-11-09 | Stop reason: HOSPADM

## 2021-11-09 RX ORDER — LIDOCAINE HYDROCHLORIDE 20 MG/ML
10 INJECTION, SOLUTION INFILTRATION; PERINEURAL ONCE
Status: COMPLETED | OUTPATIENT
Start: 2021-11-09 | End: 2021-11-09

## 2021-11-09 NOTE — PERIOP NOTES
Neuro:  Push/Pull assessment:     LUE Response: equal/strong    LLE Response: stronger push/pull   RUE Response: equal/strong    RLE Response: weaker push/pull (drop foot)

## 2021-11-09 NOTE — DISCHARGE INSTRUCTIONS
Dr. Bibiana Luis Discharge Instructions  Transforaminal Epidural Steroid Injection/ Selective Nerve Block    You had a transforaminal epidural steroid injection/ selective nerve block today. You will probably have some numbness, and possibly weakness, in your leg for the next 6 to 8 hours. The steroids will slowly become effective, reducing your pain, over the next 2 weeks. You should begin feeling better after a few days, but it may take up to 2 weeks to notice the difference. The benefit you get from your injection will last a variable amount of time, depending on the severity of your lumbar spine problem.  Pain: Most people do not have any increase in pain after this injection. However, you might experience some soreness in your low back. If this happens, putting an ice pack over the sore area will help.  Bandage: You will have a small bandage covering the site of the injection. You may remove it once you get home.  Restrictions: Someone should drive you home after the injection. After that, you have no restrictions. You need to be careful while walking, as you may still have some numbness or weakness in your leg. You may resume your normal level of activity. You may take a shower or bath, and you may eat normally. You should continue your current exercises and/or therapy routine.   Medications: Continue your current medications as prescribed. If your pain decreases, you may reduce the amount of your pain medicines. If you stopped taking anticoagulants or blood-thinners before the injection, start them tomorrow. If you have diabetes, your blood sugar may be elevated for a few days. Call your primary doctor with any questions.   Call Dr. Bibiana Luis at [phone] if you experience:   Fever (101 degrees Fahrenheit or greater)   Nausea or vomiting   Headache unrelieved by your normal pain medicine   Redness or swelling at the injection site that lasts more than 1 day   New numbness, tingling, weakness, or pain that you didnt have before the injection    Follow-up appointment:   If still having pain in 1-2 weeks, call office at [phone] for a follow up appointment. DISCHARGE SUMMARY from Nurse    The following personal items collected during your admission are returned to you:   Dental Appliance: Dental Appliances: Uppers, Lowers, Partials, With patient  Vision: Visual Aid: None  Hearing Aid:    Jewelry: Jewelry: None  Clothing: Clothing: With patient  Other Valuables: Other Valuables: None  Valuables sent to safe: If you were given prescriptions, please review the written information on prescribed medications. · You will receive a Post Operative Call from one of the Recovery Room Nurses on the day after your surgery to check on you. It is very important for us to know how you are recovering after your surgery. · You may receive an e-mail or letter in the mail from [employer] regarding your experience with us in the Ambulatory Surgery Unit. Your feedback is valuable to us and we appreciate your participation in the survey. If you have not had your influenza or pneumococcal vaccines, please follow up with your primary care physician. The discharge information has been reviewed with the patient. The patient verbalized understanding.

## 2021-11-09 NOTE — H&P
Procedural Case Note    11/9/2021    (4:08 PM)    Conrad Estrella    1935   (80 y.o.)    [id number]    CC:  pain    ROS:   Complete ROS obtained, no CP, no SOB, no N or V    PMH:     Past Medical History:   Diagnosis Date    Arthritis     osteoarthritis    Basal cell carcinoma (BCC) of skin of nose     Bladder cancer (HCC)     Chronic pain     back    Diabetes (HCC)     borderline    Foot drop, right     GERD (gastroesophageal reflux disease)     Hypertension     Insomnia     Lymphedema of left leg     Neuropathy     in both feet    Prostate cancer ([facility] [address]     Selenium deficiency     Staph infection     Both eyes       ALLERGIES:     Allergies   Allergen Reactions    Sulfa (Sulfonamide Antibiotics) Hives       MEDS:     No current facility-administered medications for this encounter. Visit Vitals  BP (!) 153/87 (BP 1 Location: Right upper arm, BP Patient Position: At rest)   Pulse 74   Temp 98.1 °F (36.7 °C)   Resp 16   Ht 5' 6.5\" (1.689 m)   Wt 67.6 kg (149 lb)   SpO2 97%   BMI 23.69 kg/m²     PE:  Gen: NAD  Head: normocephalic  Heart: RRR  Lungs: CTA arelis  Abd: NT, ND, soft  Neuro: awake and alert  Skin: intact    IMPRESSION:   LS DDD    Note:  The clinical status was discussed in detail with the patient. The procedure was again discussed and described in detail. All understand and accept the planned procedure and risks; reject other forms of treatment. All questions are answered.     Romana Peal, MD

## 2021-11-09 NOTE — PERIOP NOTES
Patient received to PACU, VSS. Patient awake and alert with no complaints of pain. Injection site intact. Neuro:  Push/Pull assessment:        LLE Response: strong push/pull   RLE Response: strong push/weak  pull     Discharge instructions given. Patient verbalized understanding of instructions and follow up. Patient states ready for discharge - patient discharged at this time by wheelchair with all belongings. friend to provide transportation home.

## 2021-11-09 NOTE — PERIOP NOTES
Quan [facility]  1935  [id number]    Situation:  Verbal report given from: Cristina Sandoval RN  Procedure: Procedure(s):  AJ L4 TRANSFORAMINAL EPIDURAL STEROID INJECTION    Background:    Preoperative diagnosis: DEGENERATIVE DISC DISEASE Lumbar [M51.36]    Postoperative diagnosis: [address] DISEASE Lumbar [M51.36]    :  Dr. Blaire Rodriguez:  Intra-procedure medications, procedure, and allergies reviewed        Recommendation:    Discharge patient home after discharge instructions reviewed with patient. Rest until local has worn off.

## 2021-11-15 ENCOUNTER — TRANSCRIBE ORDER (OUTPATIENT)
Dept: SCHEDULING | Age: 86
End: 2021-11-15

## 2021-11-15 DIAGNOSIS — M19.012 OSTEOARTHRITIS OF LEFT GLENOHUMERAL JOINT: Primary | ICD-10-CM

## 2021-11-30 ENCOUNTER — HOSPITAL ENCOUNTER (OUTPATIENT)
Dept: MRI IMAGING | Age: 86
Discharge: HOME | End: 2021-11-30
Attending: ORTHOPAEDIC SURGERY
Payer: MEDICARE

## 2021-11-30 DIAGNOSIS — M19.012 OSTEOARTHRITIS OF LEFT GLENOHUMERAL JOINT: ICD-10-CM

## 2021-11-30 PROCEDURE — 73221 MRI JOINT UPR EXTREM W/O DYE: CPT

## 2021-12-08 ENCOUNTER — HOSPITAL ENCOUNTER (OUTPATIENT)
Dept: PREADMISSION TESTING | Age: 86
Discharge: HOME | End: 2021-12-08
Payer: MEDICARE

## 2021-12-08 VITALS
SYSTOLIC BLOOD PRESSURE: 128 MMHG | WEIGHT: 149.25 LBS | BODY MASS INDEX: 23.99 KG/M2 | OXYGEN SATURATION: 99 % | HEIGHT: 66 IN | DIASTOLIC BLOOD PRESSURE: 66 MMHG | TEMPERATURE: 97.8 F | RESPIRATION RATE: 16 BRPM | HEART RATE: 64 BPM

## 2021-12-08 LAB
ABO + RH BLD: NORMAL
ALBUMIN SERPL-MCNC: 3.4 G/DL (ref 3.5–5)
ALBUMIN/GLOB SERPL: 1.2 {RATIO} (ref 1.1–2.2)
ALP SERPL-CCNC: 56 U/L (ref 45–117)
ALT SERPL-CCNC: 20 U/L (ref 12–78)
ANION GAP SERPL CALC-SCNC: 6 MMOL/L (ref 5–15)
APPEARANCE UR: CLEAR
AST SERPL-CCNC: 16 U/L (ref 15–37)
ATRIAL RATE: 58 BPM
BACTERIA URNS QL MICRO: NEGATIVE /HPF
BILIRUB SERPL-MCNC: 0.9 MG/DL (ref 0.2–1)
BILIRUB UR QL: NEGATIVE
BLOOD GROUP ANTIBODIES SERPL: NORMAL
BUN SERPL-MCNC: 18 MG/DL (ref 6–20)
BUN/CREAT SERPL: 15 (ref 12–20)
CALCIUM SERPL-MCNC: 9 MG/DL (ref 8.5–10.1)
CALCULATED P AXIS, ECG09: 30 DEGREES
CALCULATED R AXIS, ECG10: 36 DEGREES
CALCULATED T AXIS, ECG11: 38 DEGREES
CHLORIDE SERPL-SCNC: 98 MMOL/L (ref 97–108)
CO2 SERPL-SCNC: 26 MMOL/L (ref 21–32)
COLOR UR: ABNORMAL
CREAT SERPL-MCNC: 1.19 MG/DL (ref 0.7–1.3)
DIAGNOSIS, 93000: NORMAL
EPITH CASTS URNS QL MICRO: ABNORMAL /LPF
ERYTHROCYTE [DISTWIDTH] IN BLOOD BY AUTOMATED COUNT: 12.1 % (ref 11.5–14.5)
GLOBULIN SER CALC-MCNC: 2.9 G/DL (ref 2–4)
GLUCOSE SERPL-MCNC: 129 MG/DL (ref 65–100)
GLUCOSE UR STRIP.AUTO-MCNC: NEGATIVE MG/DL
HCT VFR BLD AUTO: 37.5 % (ref 36.6–50.3)
HGB BLD-MCNC: 12.8 G/DL (ref 12.1–17)
HGB UR QL STRIP: NEGATIVE
INR PPP: 1 (ref 0.9–1.1)
KETONES UR QL STRIP.AUTO: NEGATIVE MG/DL
LEUKOCYTE ESTERASE UR QL STRIP.AUTO: ABNORMAL
MCH RBC QN AUTO: 32.9 PG (ref 26–34)
MCHC RBC AUTO-ENTMCNC: 34.1 G/DL (ref 30–36.5)
MCV RBC AUTO: 96.4 FL (ref 80–99)
NITRITE UR QL STRIP.AUTO: NEGATIVE
NRBC # BLD: 0 K/UL (ref 0–0.01)
NRBC BLD-RTO: 0 PER 100 WBC
P-R INTERVAL, ECG05: 224 MS
PH UR STRIP: 5 [PH] (ref 5–8)
PLATELET # BLD AUTO: 231 K/UL (ref 150–400)
PMV BLD AUTO: 9.4 FL (ref 8.9–12.9)
POTASSIUM SERPL-SCNC: 4.1 MMOL/L (ref 3.5–5.1)
PROT SERPL-MCNC: 6.3 G/DL (ref 6.4–8.2)
PROT UR STRIP-MCNC: NEGATIVE MG/DL
PROTHROMBIN TIME: 10.6 SEC (ref 9–11.1)
Q-T INTERVAL, ECG07: 386 MS
QRS DURATION, ECG06: 104 MS
QTC CALCULATION (BEZET), ECG08: 378 MS
RBC # BLD AUTO: 3.89 M/UL (ref 4.1–5.7)
RBC #/AREA URNS HPF: ABNORMAL /HPF (ref 0–5)
SODIUM SERPL-SCNC: 130 MMOL/L (ref 136–145)
SP GR UR REFRACTOMETRY: 1.02 (ref 1–1.03)
SPECIMEN EXP DATE BLD: NORMAL
UA: UC IF INDICATED,UAUC: ABNORMAL
UROBILINOGEN UR QL STRIP.AUTO: 0.2 EU/DL (ref 0.2–1)
VENTRICULAR RATE, ECG03: 58 BPM
WBC # BLD AUTO: 6.8 K/UL (ref 4.1–11.1)
WBC URNS QL MICRO: ABNORMAL /HPF (ref 0–4)

## 2021-12-08 PROCEDURE — 97110 THERAPEUTIC EXERCISES: CPT

## 2021-12-08 PROCEDURE — 82985 ASSAY OF GLYCATED PROTEIN: CPT

## 2021-12-08 PROCEDURE — 85610 PROTHROMBIN TIME: CPT

## 2021-12-08 PROCEDURE — 85027 COMPLETE CBC AUTOMATED: CPT

## 2021-12-08 PROCEDURE — 97165 OT EVAL LOW COMPLEX 30 MIN: CPT

## 2021-12-08 PROCEDURE — 81001 URINALYSIS AUTO W/SCOPE: CPT

## 2021-12-08 PROCEDURE — 80053 COMPREHEN METABOLIC PANEL: CPT

## 2021-12-08 PROCEDURE — 86901 BLOOD TYPING SEROLOGIC RH(D): CPT

## 2021-12-08 PROCEDURE — 97535 SELF CARE MNGMENT TRAINING: CPT

## 2021-12-08 PROCEDURE — 93005 ELECTROCARDIOGRAM TRACING: CPT

## 2021-12-08 RX ORDER — GABAPENTIN 600 MG/1
600 TABLET ORAL
COMMUNITY

## 2021-12-08 NOTE — PERIOP NOTES
Incentive Spirometer        Using the incentive spirometer helps expand the small air sacs of your lungs, helps you breathe deeply, and helps improve your lung function. Use your incentive spirometer twice a day (10 breaths each time) prior to surgery. How to Use Your Incentive Spirometer:  1. Hold the incentive spirometer in an upright position. 2. Breathe out as usual.   3. Place the mouthpiece in your mouth and seal your lips tightly around it. 4. Take a deep breath. Breathe in slowly and as deeply as possible. Keep the blue flow rate guide between the arrows. 5. Hold your breath as long as possible. Then exhale slowly and allow the piston to fall to the bottom of the column. 6. Rest for a few seconds and repeat steps one through five at least 10 times. PAT Tidal Volume__2500________________  x_2_______________  Date_12/8/21______________________    Julian Cullens THE INCENTIVE SPIROMETER WITH YOU TO THE HOSPITAL ON THE DAY OF YOUR SURGERY. Opportunity given to ask and answer questions as well as to observe return demonstration.     Patient signature_____________________________    Witness____________________________

## 2021-12-08 NOTE — PERIOP NOTES
Hibiclens/Chlorhexidine    Preventing Infections Before and After  Your Surgery    IMPORTANT INSTRUCTIONS    Please read and follow these instructions carefully. If you are unable to comply with the below instructions your procedure will be cancelled. Every Night for Three (3) nights before your surgery:  1. Shower with an antibacterial soap, such as Dial, or the soap provided at your preassessment appointment. A shower is better than a bath for cleaning your skin. 2. If needed, ask someone to help you reach all areas of your body. Dont forget to clean your belly button with every shower. The night before your surgery: If you lose your Hibiclens/chlorhexidine please contact surgery center or you can purchase it at a local pharmacy  1. On the night before your surgery, shower with an antibacterial soap, such as Dial, or the soap provided at your preassessment appointment. 2. With one packet of Hibiclens/Chlorhexidine in hand, turn water off.  3. Apply Hibiclens antiseptic skin cleanser with a clean, freshly washed washcloth. ? Gently apply to your body from chin to toes (except the genital area) and especially the area(s) where your incision(s) will be. ? Leave Hibiclens/Chlorhexidine on your skin for at least 20 seconds. CAUTION: If needed, Hibiclens/chlorhexidine may be used to clean the folds of skin of the legs (such as in the area of the groin) and on your buttocks and hips. However, do not use Hibiclens/Chlorhexidine above the neck or in the genital area (your bottom) or put inside any area of your body. 4. Turn the water back on and rinse. 5. Dry gently with a clean, freshly washed towel. 6. After your shower, do not use any powder, deodorant, perfumes or lotion. 7. Use clean, freshly washed towels and washcloths every time you shower. 8. Wear clean, freshly washed pajamas to bed the night before surgery. 9. Sleep on clean, freshly washed sheets.   10. Do not allow pets to sleep in your bed with you. The Morning of your surgery:  1. Shower again thoroughly with an antibacterial soap, such as Dial or the soap provided at your preassessment appointment. If needed, ask someone for help to reach all areas of your body. Dont forget to clean your belly button! Rinse. 2. Dry gently with a clean, freshly washed towel. 3. After your shower, do not use any powder, deodorant, perfumes or lotion prior to surgery. 4. Put on clean, freshly washed clothing. Tips to help prevent infections after your surgery:  1. Protect your surgical wound from germs:  ? Hand washing is the most important thing you and your caregivers can do to prevent infections. ? Keep your bandage clean and dry! ? Do not touch your surgical wound. 2. Use clean, freshly washed towels and washcloths every time you shower; do not share bath linens with others. 3. Until your surgical wound is healed, wear clothing and sleep on bed linens each day that are clean and freshly washed. 4. Do not allow pets to sleep in your bed with you or touch your surgical wound. 5. Do not smoke  smoking delays wound healing. This may be a good time to stop smoking. 6. If you have diabetes, it is important for you to manage your blood sugar levels properly before your surgery as well as after your surgery. Poorly managed blood sugar levels slow down wound healing and prevent you from healing completely. If you lose your Hibiclens/chlorhexidine, please call the [facility], or it is available for purchase at your pharmacy.                ___________________      ___________________      ________________  (Signature of Patient)          (Witness)                   (Date and Time)

## 2021-12-08 NOTE — PERIOP NOTES
[facility] \"Don't shoulder this alone! Tips to prepare and safely care for yourself / Shoulder replacement surgery\" patient handbook was provided & reviewed during the patients pre-admission testing (PAT) appointment. An opportunity for questions was provided, patient verbalized understanding. Na-130. Notified Bob Chung NP. Per recommendations of NP, patient is to restrict fluids, add table salt. Patient verbalizes understanding.

## 2021-12-08 NOTE — PERIOP NOTES
[facility]  Ambulatory Surgery Unit  Joint/Spine Preoperative Instructions        Surgery Date 12/16/21          Tentative Arrival Time To be called @ [phone]   Covid test scheduled 12/13    1. On the day of your surgery, please report to the Ambulatory Surgery Unit Registration Desk and sign in at your designated time. The Ambulatory Surgery Unit is located in [facility] on the [facility] side of the [facility], across from the [address] [place] [address]. Pleases have all of your insurance cards, photo ID and copay with you the morning of surgery. 2. You must have someone with you to drive you home. You should not drive a car for 24 hours following surgery. Please make arrangements for a friend or family member to stay with you at least the first 24 hours after your surgery. 3. No food after midnight . This includes gum, candy, or mints. Medications morning of surgery should be taken with a sip of water. Please follow pre-surgery drink instructions that were given at your Pre Admission Testing appointment. 4. We recommend you do not drink any alcoholic beverages for 24 hours before and after your surgery. 5. Contact your surgeons office for instructions on the following medications: non-steroidal anti-inflammatory drugs (i.e. Advil, Aleve), vitamins, and supplements. (Some surgeons will want you to stop these medications prior to surgery and others may allow you to take them)  **If you are currently taking Plavix, Coumadin, Aspirin and/or other blood-thinning agents, contact your surgeon for instructions. ** Your surgeon will partner with the physician prescribing these medications to determine if it is safe to stop or if you need to continue taking. Please do not stop taking these medications without instructions from your surgeon    6. Wear comfortable clothes. Wear glasses instead of contacts. Do not bring any money or jewelry.  Please bring picture ID, insurance card, and any prearranged co-payment or hospital payment. Do not wear make-up, particularly mascara the morning of your surgery. Do not wear nail polish, particularly if you are having foot /hand surgery. Wear your hair loose or down, no ponytails, buns, lupillo pins or clips. All body piercings must be removed. Please do not shave for 48 hours prior to surgery. Shaving of the face is acceptable. Please see the attached Hibiclens bathing instructions. 7. You should understand that if you do not follow these instructions your surgery may be cancelled. If your physical condition changes (I.e. fever, cold or flu) please contact your surgeon as soon as possible. 8. It is important that you be on time. If a situation occurs where you may be late, please call [phone].    9. If you have any questions and or problems, please call [phone].    10. Your surgery time may be subject to change. You will receive a phone call the afternoon before surgery with your updated arrival time. 11.  If having outpatient surgery, you must have someone to drive you here, stay with you during the duration of your stay, and to drive you home at time of discharge. 12. The following link is for the educational video for patients and/or families. http://[email or web address].org/. [email or web address]-[email or web address]-[email or web address]-materials    Special Instructions: follow instructions for celebrex, vitamins and supplements per surgeon prior to surgery     TAKE ALL MEDICATIONS THE DAY OF SURGERY EXCEPT: no exceptions, may take gabapentin, omeprazole, eye drops with a sip of water     I understand a pre-operative phone call will be made to verify my surgery time. In the event that I am not available, I give permission for a message to be left on my answering service and/or with another person?   yes         ___________________      __________   _________    Pat Carp of Patient)             (Witness)                (Date and Time)

## 2021-12-08 NOTE — PERIOP NOTES
Orthopedic and Spine Patients: Instructions on When You Can   Eat or Drink Before Surgery      You have been provided a pre-surgery drink received at your pre-admission testing appointment.  Night before surgery:  o You should drink 1/2 bottle of the  pre-surgery drink at bedtime. No food after midnight!  Day of Surgery:  o Complete 2nd half of the bottle of the pre-surgery drink 1 hour prior to arrival at hospital.  For questions call Pre-Admission Testing at [phone]. They are available from 8:00am-5:00pm, Monday through Friday.

## 2021-12-08 NOTE — PROGRESS NOTES
[facility]  Occupational Therapy Pre-surgery evaluation  [address]  [place], [address] [facility]    OCCUPATIONAL THERAPY PRE TOTAL SHOULDER SURGERY EVALUATION  Patient:Trev Vasquez [de-identified]80 y.o. 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 Pt was living at home alone but has a significant other that lives near by. He was independent with ADLs, ILS and driving, and stated he has been seeing PT for his back issues and balance. Pt stood and had scissoring as he was walking to the door of room and stated that he walks like a drunk at times. Educated pt on sitting on EOb for at least 1 minute, standing and waiting at least a minute before he starts to walk. Pt did much better walking after education. Discussed anticipated disposition to home with possible discharge within a 1 - 2 day time frame post-surgery. Patient and  in agreement. Anticipate patient will need acute OT orders based on balance deficits post surgery. GOALS: (Goals have been discussed and agreed upon with patient.)  DISCHARGE GOALS: Time Frame: 1 DAY  1. Patient will demonstrate and/or verbalize full understanding of instructions related compensatory UB ADL techniques, sling management, shoulder/UE positioning for pain control, post operative shoulder exercises and functional mobility in order to minimize functional deficits in preparation for their upcoming surgery.  This will be achieved by using education, demonstration and through the use of an informational handout including a home exercise program.  REHABILITATION POTENTIAL FOR STATED GOALS: Good     RECOMMENDATIONS AND PLANNED INTERVENTIONS: (Benefits and precautions of occupational therapy have been discussed with the patient.)    TREATMENT PLAN EFFECTIVE DATES: 12/8/2021 to 12/8/2021        SUBJECTIVE:   Patient stated I fall a lot at home and walk like a drunk.     OBJECTIVE DATA SUMMARY:   HISTORY:      Past Medical History:   Diagnosis Date    Arthritis     osteoarthritis    Basal cell carcinoma (BCC) of skin of nose     Bladder cancer (HCC)     Chronic pain     back    Diabetes (HCC)     borderline    Foot drop, right     GERD (gastroesophageal reflux disease)     Hypertension     Insomnia     Lymphedema of left leg     Nephritis     age 8, brights disease    Neuropathy     in both feet    Prostate cancer ([address]     Selenium deficiency     Staph infection     Both eyes     Past Surgical History:   Procedure Laterality Date    HX CARPAL TUNNEL RELEASE Left 2012    HX CARPAL TUNNEL RELEASE Right 2015    HX CATARACT REMOVAL Right 2009    HX CATARACT REMOVAL Left 2019    HX HERNIA REPAIR  11/19/2020    Recurrent right inguinal hernia X2, left X1    HX ORTHOPAEDIC      cervical spine spacers placed    HX ORTHOPAEDIC  7-2012    lumbar (back surgery)    HX ORTHOPAEDIC Right     wrist-Reconstructed after Carpal Tunnel Release    HX OTHER SURGICAL  10/2011    basal cell CA nose    HX OTHER SURGICAL      squamous cell removed from forehead    HX PROSTATECTOMY  1998    due to CA    HX ROTATOR CUFF REPAIR Right 2009    Dr. Andressa Gonzalez     HX SEPTOPLASTY      HX UROLOGICAL      surgery inside of my bladder,    HX UROLOGICAL  10/2021    2 small baldder tumors removed       Prior Level of Function/Home Situation: pt lives alone and stated he has a significant other that lives near by that will be able to assist and he was independent with ADLs and  ILS  Personal factors and/or comorbidities impacting plan of care:       Home Situation  Home Environment: Private residence  # Steps to Enter: 6  Rails to Enter: Yes  Hand Rails : Bilateral  One/Two Story Residence: One story  Living Alone: Yes  Support Systems: Spouse/Significant Other  Patient Expects to be Discharged to[de-identified] House  Current DME Used/Available at Home: None  Tub or Shower Type: Shower          EXAMINATION/PRESENTATION/DECISION MAKING:     ADLs (Current Functional Status):    Bathing/Showering:   [x] Independent  [] Requires Assistance from Someone for upper body  [] Requires Assistance from Someone for lower body  [] Sponge Bath Only   Ambulation:  [x] Independent  [] Use Assistive Device  [] Use Wheelchair Only     Dressing:  [x] [address] from Someone for:  [] Shirts  [] Bra  [] Clothing fasteners  [] Sock/Shoes  [] Pants  [] Everything   Household Activities:  [x] Routine house and yard work  [] Light Housework Only  [] None       Critical Behavior:  Neurologic State: Alert  Orientation Level: Appropriate for age  Cognition: Appropriate decision making  Safety/Judgement: Awareness of environment    Strength:     Strength: Generally decreased, functional                       Tone & Sensation:   Tone: Normal              Sensation: Intact                  Range Of Motion:     AROM: Generally decreased, functional (intact on right)   with pain        PROM: Generally decreased, functional (intact  on right)                Coordination:   Coordination: Within functional limits    Functional Mobility:  Transfers:  Sit to Stand: Independent  Stand to Sit: Independent        Bed to Chair: Independent              Balance:   Sitting: Intact  Standing: Intact  ADL Assessment:  Feeding: Independent    Oral Facial Hygiene/Grooming: Independent    Bathing: Independent    Upper Body Dressing: Independent    Lower Body Dressing: Independent    Toileting: Independent                Functional Measure:  Barthel Index:    Bathin  Bladder: 10  Bowels: 10  Groomin  Dressing: 10  Feeding: 10  Mobility: 15  Stairs: 10  Toilet Use: 10  Transfer (Bed to Chair and Back): 15  Total: 100/100        The Barthel ADL Index: Guidelines  1. The index should be used as a record of what a patient does, not as a record of what a patient could do. 2. The main aim is to establish degree of independence from any help, physical or verbal, however minor and for whatever reason. 3. The need for supervision renders the patient not independent. 4. A patient's performance should be established using the best available evidence. Asking the patient, friends/relatives and nurses are the usual sources, but direct observation and common sense are also important. However direct testing is not needed. 5. Usually the patient's performance over the preceding 24-48 hours is important, but occasionally longer periods will be relevant. 6. Middle categories imply that the patient supplies over 50 per cent of the effort. 7. Use of aids to be independent is allowed. Dwayne Huff., Barthel, D.W. ([id number]). Functional evaluation: the Barthel Index. [address] [facility] (14)2. Jaclyn Lr raza JOSIAH Coulter, Edith Ahumada., Florentino Dasilvasen., [place], [address] [place] [address] (1999). Measuring the change indisability after inpatient rehabilitation; comparison of the responsiveness of the Barthel Index and Functional [place] Measure. Journal of Neurology, Neurosurgery, and Psychiatry, 66(4), 048-017. Akhil Estes, N.J.A, JASON Meléndez.ODELL, & Feroz Stoll, M.A. (2004.) Assessment of post-stroke quality of life in cost-effectiveness studies: The usefulness of the Barthel Index and the EuroQoL-5D. Quality of Life Research, 13, 427-43      Pain:  Pain Scale 1: Numeric (0 - 10)  Pain Intensity 1: 0                Activity Tolerance:   good   Patient []   does  [x]   does not demonstrate signs/symptoms of shortness of breath/dyspnea on exertion/respiratory distress. COMMUNICATION/EDUCATION:   The patient was educated on:  [x]     Early post operative mobility is imperative to achieve a patient's desired outcomes and to restore biological function.   [x]     Post operative Total Shoulder Arthroplasty precautions. These precautions are based on the patient's physician and the procedure(s) performed. [x]     Patient instructed on the importance of practicing management of ADLs using the non-operative UE only, prior to surgery to prepare for post op  recovery. [x]     Patient instructed on the importance of practicing bed mobility, transfers and ambulating with operative UE in sling if arm is uncomfortable  [x]     Reviewed sling donning/doffing procedure. [x]     Provided instruction on use of compensatory upper body dressing techniques and issued handout. [x]     Patient instructed on proper positioning of operative shoulder/UE in bed/chair, in order minimize pain and provide adequate support. [x]     Provided instruction on performance of pendulum exercises, and active assisted shoulder flexion. Total Shoulder Arthroplasty Precautions:  [x]    No pushing, pulling or weight bearing with operative upper extremity, and no external rotation of operative shoulder beyond neutral.  [x]     AROM of operative shoulder per comfort,  pendulum exercises and passive/active assisted/active shoulder flexion of operative shoulder permitted until cleared by surgeon to progress activity. [x]    AROM of elbow, hand and wrist on operative extremity permitted. [x]    Sling may be removed to perform pendulum exercises, active assisted/passive/AROM shoulder elevation, and upper body ADLs.   Pt is able to remove the sling when UP and only wear for comfort  [x]    Wear Sling when sleeping as needed, but sling can be off when sitting up in chair and while up walking if there is no pain        The patients plan of care was discussed as follows:   [x]         The patient verbalized understanding of his/her plan in preparation for their upcoming surgery  []         The patient's  was present for this session  []         The patient reports that he/she does not have a  identified at this time  []         The  verbalized understanding of the education regarding the patient's upcoming surgery  [x]         Patient/family agree to work toward stated goals and plan of care. []         Patient understands intent and goals of therapy, but is neutral about his/her participation. []         Patient is unable to participate in goal setting and plan of care.       Thank you for this referral.  Abby Everett, OT

## 2021-12-09 LAB
BACTERIA SPEC CULT: NORMAL
BACTERIA SPEC CULT: NORMAL
FRUCTOSAMINE SERPL-SCNC: 249 UMOL/L (ref 0–285)
SERVICE CMNT-IMP: NORMAL

## 2021-12-09 NOTE — ADVANCED PRACTICE NURSE
PAT Nurse Practitioner   Pre-Operative Chart Review/Assessment:-ORTHOPEDIC/NEUROSURGICAL SPINE                Patient Name:  Sj                                                            Age:   80 y.o.    :  1935     Today's Date:  2021     Date of PAT:   22     Date of Surgery:    2021      Procedure(s):  Left  Shoulder Reverse Arthroplasty, Axillary Nerve Neuroplasty     Surgeon:   Bozena Sánchez     Medical Clearance:  PCP is Dr. Emil Martinez:      1)  Cardiac Clearance:  Not requested       2)  Program for Diabetes Health Consult:  Not indicated-Fructosamine 249      3)  Sleep Apnea evaluation:   Not indicated-FEDERICO 3      4) Treatment for MRSA/Staph Aureus:  Negative       5) Additional Concerns:  Prediabetes, HTN, chronic pain, neuropathy, hyponatremia, hx of prostate CA. Balance issues, multiple falls.                  Vital Signs:         Visit Vitals  /66 (BP 1 Location: Right upper arm, BP Patient Position: At rest;Sitting)   Pulse 64   Temp 97.8 °F (36.6 °C)   Resp 16   Ht 5' 6\" (1.676 m)   Wt 67.7 kg (149 lb 4 oz)   SpO2 99%   BMI 24.09 kg/m²                        ____________________________________________  PAST MEDICAL HISTORY  Past Medical History:   Diagnosis Date    Arthritis     osteoarthritis    Basal cell carcinoma (BCC) of skin of nose     Bladder cancer (HCC)     Chronic pain     back    Diabetes (HCC)     borderline    Foot drop, right     GERD (gastroesophageal reflux disease)     Hypertension     Insomnia     Lymphedema of left leg     Nephritis     age 8, brights disease    Neuropathy     in both feet    [address] [address]     Selenium deficiency     Staph infection     Both eyes      ____________________________________________  PAST SURGICAL HISTORY  Past Surgical History:   Procedure Laterality Date    HX CARPAL TUNNEL RELEASE Left     HX CARPAL TUNNEL RELEASE Right     HX CATARACT REMOVAL Right     HX CATARACT REMOVAL Left 2019    HX HERNIA REPAIR  11/19/2020    Recurrent right inguinal hernia X2, left X1    HX ORTHOPAEDIC      cervical spine spacers placed    HX ORTHOPAEDIC  7-2012    lumbar (back surgery)    HX ORTHOPAEDIC Right     wrist-Reconstructed after Carpal Tunnel Release    HX OTHER SURGICAL  10/2011    basal cell CA nose    HX OTHER SURGICAL      squamous cell removed from forehead    HX PROSTATECTOMY  1998    due to CA    HX ROTATOR CUFF REPAIR Right 2009    Dr. Dasia Hester     HX SEPTOPLASTY      HX UROLOGICAL      surgery inside of my bladder,    HX UROLOGICAL  10/2021    2 small baldder tumors removed      ____________________________________________  HOME MEDICATIONS    Current Outpatient Medications   Medication Sig    gabapentin (NEURONTIN) 600 mg tablet Take 600 mg by mouth nightly.  DULoxetine (CYMBALTA) 30 mg capsule TAKE ONE CAPSULE BY MOUTH DAILY (Patient taking differently: Take 30 mg by mouth nightly.)    gabapentin (NEURONTIN) 600 mg tablet TAKE ONE TABLET BY MOUTH THREE TIMES A DAY (Patient taking differently: Take 300 mg by mouth daily. TAKE 1/2 TABLET (300 mg) in am 1 tab(600 mg) in PM)    cyanocobalamin 1,000 mcg tablet Take 1,000 mcg by mouth daily.  cholecalciferol (VITAMIN D3) (1000 Units /25 mcg) tablet Take 1,000 Units by mouth daily.  tadalafil (CIALIS) 20 mg tablet Take 20 mg by mouth daily as needed for Erectile Dysfunction.  magnesium 250 mg tab Take 1 Tab by mouth daily.  RESTASIS 0.05 % ophthalmic emulsion Administer 1 Drop to both eyes every twelve (12) hours.  celecoxib (CELEBREX) 200 mg capsule Take 200 mg by mouth daily.  lisinopril (PRINIVIL, ZESTRIL) 10 mg tablet Take 10 mg by mouth nightly.  traZODone (DESYREL) 50 mg tablet Take 100 mg by mouth nightly.  copper gluconate 2 mg tab tablet Take 2 mg by mouth daily.  FOLIC ACID PO Take 1 Tab by mouth daily.     SALMON OIL/OMEGA-3 FATTY ACIDS (SALMON OIL-1000 PO) Take 1 Tab by mouth daily.    SACCHAROMYCES BOULARDII (PROBIOTIC, S.BOULARDII, PO) Take 1 Tab by mouth daily.  omeprazole (PRILOSEC) 40 mg capsule Take 40 mg by mouth daily.  nicotinic acid (NIACIN) 500 mg tablet Take 500 mg by mouth nightly.  GLUCOSAMINE HCL/CHONDR WELDON A NA (GLUCOSAMINE-CHONDROITIN) 750-600 mg Tab Take 2 Tabs by mouth daily.  multivitamin (ONE A DAY) tablet Take 1 Tab by mouth daily. No current facility-administered medications for this encounter.      ____________________________________________  ALLERGIES  Allergies   Allergen Reactions    Sulfa (Sulfonamide Antibiotics) Hives      ____________________________________________  SOCIAL HISTORY  Social History     Tobacco Use    Smoking status: Never Smoker    Smokeless tobacco: Former User   Substance Use Topics    Alcohol use:  Yes     Alcohol/week: 2.0 standard drinks     Types: 2 Shots of liquor per week      ____________________________________________  COVID VACCINATION STATUS:      Internal Administration   First Dose COVID-19, Moderna, Primary or Immunocompromised Series, MRNA, PF, 100mcg/0.5mL  01/23/2021   Second Dose COVID-19, Moderna, Primary or Immunocompromised Series, MRNA, PF, 100mcg/0.5mL  02/20/2021      Last COVID Lab SARS-CoV-2 ( )   Date Value   11/16/2020 Not Detected                      Labs:     Hospital Outpatient Visit on 12/08/2021   Component Date Value Ref Range Status    WBC 12/08/2021 6.8  4.1 - 11.1 K/uL Final    RBC 12/08/2021 3.89* 4.10 - 5.70 M/uL Final    HGB 12/08/2021 12.8  12.1 - 17.0 g/dL Final    HCT 12/08/2021 37.5  36.6 - 50.3 % Final    MCV 12/08/2021 96.4  80.0 - 99.0 FL Final    MCH 12/08/2021 32.9  26.0 - 34.0 PG Final    MCHC 12/08/2021 34.1  30.0 - 36.5 g/dL Final    RDW 12/08/2021 12.1  11.5 - 14.5 % Final    PLATELET 97/61/9373 894  150 - 400 K/uL Final    MPV 12/08/2021 9.4  8.9 - 12.9 FL Final    NRBC 12/08/2021 0.0  0  WBC Final    ABSOLUTE NRBC 12/08/2021 0.00  0.00 - 0.01 K/uL Final    Ventricular Rate 12/08/2021 58  BPM Final    Atrial Rate 12/08/2021 58  BPM Final    P-R Interval 12/08/2021 224  ms Final    QRS Duration 12/08/2021 104  ms Final    Q-T Interval 12/08/2021 386  ms Final    QTC Calculation (Bezet) 12/08/2021 378  ms Final    Calculated P Axis 12/08/2021 30  degrees Final    Calculated R Axis 12/08/2021 36  degrees Final    Calculated T Axis 12/08/2021 38  degrees Final    Diagnosis 12/08/2021    Final                    Value:Sinus bradycardia with 1st degree AV block  Septal infarct , age undetermined  When compared with ECG of 13-NOV-2020 12:46,  Septal infarct is now present  Confirmed by Willem Purdy ([id number]) on 12/8/2021 1:55:43 PM      INR 12/08/2021 1.0  0.9 - 1.1   Final    A single therapeutic range for Vit K antagonists may not be optimal for all indications - see June, 2008 issue of Chest, American College of Chest Physicians Evidence-Based Clinical Practice Guidelines, 8th Edition.  Prothrombin time 12/08/2021 10.6  9.0 - 11.1 sec Final    Color 12/08/2021 YELLOW/STRAW    Final    Color Reference Range: Straw, Yellow or Dark Yellow    Appearance 12/08/2021 CLEAR  CLEAR   Final    Specific gravity 12/08/2021 1.025  1.003 - 1.030   Final    pH (UA) 12/08/2021 5.0  5.0 - 8.0   Final    Protein 12/08/2021 Negative  NEG mg/dL Final    Glucose 12/08/2021 Negative  NEG mg/dL Final    Ketone 12/08/2021 Negative  NEG mg/dL Final    Bilirubin 12/08/2021 Negative  NEG   Final    Blood 12/08/2021 Negative  NEG   Final    Urobilinogen 12/08/2021 0.2  0.2 - 1.0 EU/dL Final    Nitrites 12/08/2021 Negative  NEG   Final    Leukocyte Esterase 12/08/2021 TRACE* NEG   Final    WBC 12/08/2021 5-10  0 - 4 /hpf Final    RBC 12/08/2021 5-10  0 - 5 /hpf Final    Epithelial cells 12/08/2021 FEW  FEW /lpf Final    Epithelial cell category consists of squamous cells and /or transitional urothelial cells.  Renal tubular cells, if present, are separately identified as such.  Bacteria 12/08/2021 Negative  NEG /hpf Final    UA:UC IF INDICATED 12/08/2021 CULTURE NOT INDICATED BY UA RESULT  CNI   Final    Sodium 12/08/2021 130* 136 - 145 mmol/L Final    Potassium 12/08/2021 4.1  3.5 - 5.1 mmol/L Final    Chloride 12/08/2021 98  97 - 108 mmol/L Final    CO2 12/08/2021 26  21 - 32 mmol/L Final    Anion gap 12/08/2021 6  5 - 15 mmol/L Final    Glucose 12/08/2021 129* 65 - 100 mg/dL Final    BUN 12/08/2021 18  6 - 20 MG/DL Final    Creatinine 12/08/2021 1.19  0.70 - 1.30 MG/DL Final    BUN/Creatinine ratio 12/08/2021 15  12 - 20   Final    GFR est AA 12/08/2021 >60  >60 ml/min/1.73m2 Final    GFR est non-AA 12/08/2021 58* >60 ml/min/1.73m2 Final    Estimated GFR is calculated using the IDMS-traceable Modification of Diet in Renal Disease (MDRD) Study equation, reported for both  Americans (GFRAA) and non- Americans (GFRNA), and normalized to 1.73m2 body surface area. The physician must decide which value applies to the patient.  Calcium 12/08/2021 9.0  8.5 - 10.1 MG/DL Final    Bilirubin, total 12/08/2021 0.9  0.2 - 1.0 MG/DL Final    ALT (SGPT) 12/08/2021 20  12 - 78 U/L Final    AST (SGOT) 12/08/2021 16  15 - 37 U/L Final    Alk. phosphatase 12/08/2021 56  45 - 117 U/L Final    Protein, total 12/08/2021 6.3* 6.4 - 8.2 g/dL Final    Albumin 12/08/2021 3.4* 3.5 - 5.0 g/dL Final    Globulin 12/08/2021 2.9  2.0 - 4.0 g/dL Final    A-G Ratio 12/08/2021 1.2  1.1 - 2.2   Final    Crossmatch Expiration 12/08/2021 12/19/2021,2359   Final    ABO/Rh(D) 12/08/2021 O POSITIVE   Final    Antibody screen 12/08/2021 NEG   Final        XR Results (most recent):    Results from [place] encounter on 11/09/21    XR SPINE SNGL V (CROSS TABLE LAT)    Narrative  INDICATION: Pain procedure    EXAM: XR SPINE SNGL V (CROSS TABLE LAT)    FINDINGS: Fluoroscopic imaging utilized during procedure.     Impression  Fluoroscopic imaging during procedure. REPORT PROVIDED FOR COMPLIANCE ONLY AT NO CHARGE. Skin:   Denies open wounds, cuts, sores, rashes or other areas of concern in PAT assessment. Daysi Carrillo NP    EKG from 12/8/21 reviewed with Dr. Grant Rodriguez, anesthesiologist and compared with previous EKG from 11/13/20. Planned procedure, PMHx, functional status reviewed.  Pt ok to proceed with planned procedure per Dr. Grant Rodriguez

## 2021-12-13 ENCOUNTER — HOSPITAL ENCOUNTER (OUTPATIENT)
Dept: PREADMISSION TESTING | Age: 86
Discharge: HOME | End: 2021-12-13

## 2022-01-03 ENCOUNTER — HOSPITAL ENCOUNTER (OUTPATIENT)
Dept: SURGERY | Age: 87
Setting detail: OUTPATIENT SURGERY
Discharge: HOME | End: 2022-01-03
Payer: MEDICARE

## 2022-01-03 ENCOUNTER — HOSPITAL ENCOUNTER (OUTPATIENT)
Dept: PREADMISSION TESTING | Age: 87
Discharge: HOME | End: 2022-01-03

## 2022-01-03 VITALS — OXYGEN SATURATION: 95 % | TEMPERATURE: 97.8 F | RESPIRATION RATE: 16 BRPM | HEART RATE: 62 BPM

## 2022-01-03 PROBLEM — M75.101 RIGHT ROTATOR CUFF TEAR ARTHROPATHY: Status: ACTIVE | Noted: 2022-01-03

## 2022-01-03 PROBLEM — M12.811 RIGHT ROTATOR CUFF TEAR ARTHROPATHY: Status: ACTIVE | Noted: 2022-01-03

## 2022-01-03 LAB
ABO + RH BLD: NORMAL
BLOOD GROUP ANTIBODIES SERPL: NORMAL
SPECIMEN EXP DATE BLD: NORMAL

## 2022-01-03 PROCEDURE — 36415 COLL VENOUS BLD VENIPUNCTURE: CPT

## 2022-01-03 PROCEDURE — 86900 BLOOD TYPING SEROLOGIC ABO: CPT

## 2022-01-03 PROCEDURE — U0005 INFEC AGEN DETEC AMPLI PROBE: HCPCS

## 2022-01-03 NOTE — H&P
PRE- OP History and Physical                             Subjective:     Patient is a 80 y.o. male presented with a history of L shoulder pain. He notes of fall 2 months ago where he landed on his L shoulder. He states of recent fall a couple weeks ago where he hurt his wrist. He states that certain movements, especially overhead movements, exacerbate his pain. He states of previous cortisone injection in his shoulder which failed to provide relief. He is type 2 diabetic, nonsmoker. .  The patient's condition has been resistant to non-operative treatment and is being admitted for surgical management of this condition.      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    Bilateral carotid artery stenosis 07/09/2018    Cervical post-laminectomy syndrome 01/05/2016    Lumbar post-laminectomy syndrome 01/05/2016    Lumbar back pain with radiculopathy affecting left lower extremity 01/05/2016    Left homonymous hemianopsia 01/05/2016    Diabetic peripheral neuropathy associated with type 2 diabetes mellitus ([address] 01/05/2016    Idiopathic small and large fiber sensory neuropathy 01/05/2016    Memory change 01/05/2016    Presbyacusia 01/05/2016    Acquired deformity of nose 10/14/2013     Past Medical History:   Diagnosis Date    Arthritis     osteoarthritis    Basal cell carcinoma (BCC) of skin of nose     Bladder cancer (HCC)     Chronic pain     back    Diabetes (HCC)     borderline    First degree AV block     Foot drop, right     GERD (gastroesophageal reflux disease)     Hypertension     Insomnia     Lymphedema of left leg     Nephritis     age 8, brights disease    Neuropathy     in both feet    Prostate cancer ([address]     Selenium deficiency     Staph infection     Both eyes      Past Surgical History:   Procedure Laterality Date    HX CARPAL TUNNEL RELEASE Left 2012    HX CARPAL TUNNEL RELEASE Right 2015    HX CATARACT REMOVAL Right 2009    HX CATARACT REMOVAL Left 2019    HX HERNIA REPAIR  11/19/2020    Recurrent right inguinal hernia X2, left X1    HX ORTHOPAEDIC      cervical spine spacers placed    HX ORTHOPAEDIC  7-2012    lumbar (back surgery)    HX ORTHOPAEDIC Right     wrist-Reconstructed after Carpal Tunnel Release    HX OTHER SURGICAL  10/2011    basal cell CA nose    HX OTHER SURGICAL      squamous cell removed from forehead    HX PROSTATECTOMY  1998    due to CA    HX ROTATOR CUFF REPAIR Right 2009    Dr. [facility] HX SEPTOPLASTY      HX UROLOGICAL      surgery inside of my bladder,    HX UROLOGICAL  10/2021    2 small baldder tumors removed      Prior to Admission medications    Medication Sig Start Date End Date Taking? Authorizing Provider   gabapentin (NEURONTIN) 600 mg tablet Take 600 mg by mouth nightly. Provider, Historical   DULoxetine (CYMBALTA) 30 mg capsule TAKE ONE CAPSULE BY MOUTH DAILY  Patient taking differently: Take 30 mg by mouth nightly. 7/16/21   Damian Cain MD   gabapentin (NEURONTIN) 600 mg tablet TAKE ONE TABLET BY MOUTH THREE TIMES A DAY  Patient taking differently: Take 300 mg by mouth daily. TAKE 1/2 TABLET (300 mg) in am 1 tab(600 mg) in PM 7/12/21   Damian Cain MD   cyanocobalamin 1,000 mcg tablet Take 1,000 mcg by mouth daily. Provider, Historical   cholecalciferol (VITAMIN D3) (1000 Units /25 mcg) tablet Take 1,000 Units by mouth daily. Provider, Historical   tadalafil (CIALIS) 20 mg tablet Take 20 mg by mouth daily as needed for Erectile Dysfunction. Provider, Historical   magnesium 250 mg tab Take 1 Tab by mouth daily. Provider, Historical   RESTASIS 0.05 % ophthalmic emulsion Administer 1 Drop to both eyes every twelve (12) hours.  11/2/17   Provider, Historical   celecoxib (CELEBREX) 200 mg capsule Take 200 mg by mouth daily. 11/26/16   Provider, Historical   lisinopril (PRINIVIL, ZESTRIL) 10 mg tablet Take 10 mg by mouth nightly. 12/7/16   Provider, Historical   traZODone (DESYREL) 50 mg tablet Take 100 mg by mouth nightly. 12/7/16   Provider, Historical   copper gluconate 2 mg tab tablet Take 2 mg by mouth daily. Provider, Historical   FOLIC ACID PO Take 1 Tab by mouth daily. Provider, Historical   SALMON OIL/OMEGA-3 FATTY ACIDS (SALMON OIL-1000 PO) Take 1 Tab by mouth daily. Provider, Historical   SACCHAROMYCES BOULARDII (PROBIOTIC, S.BOULARDII, PO) Take 1 Tab by mouth daily. Provider, Historical   omeprazole (PRILOSEC) 40 mg capsule Take 40 mg by mouth daily. Provider, Historical   nicotinic acid (NIACIN) 500 mg tablet Take 500 mg by mouth nightly. Provider, Historical   GLUCOSAMINE HCL/CHONDR WELDON A NA (GLUCOSAMINE-CHONDROITIN) 750-600 mg Tab Take 2 Tabs by mouth daily. Provider, Historical   multivitamin (ONE A DAY) tablet Take 1 Tab by mouth daily. Provider, Historical     Allergies   Allergen Reactions    Sulfa (Sulfonamide Antibiotics) Hives      Social History     Tobacco Use    Smoking status: Never Smoker    Smokeless tobacco: Former User   Substance Use Topics    Alcohol use: Yes     Alcohol/week: 2.0 standard drinks     Types: 2 Shots of liquor per week      Family History   Problem Relation Age of Onset   Leyla Jurado Other Mother         old age   Leyla Jurado Other Father         typhoid fever    Hypertension Brother     Asthma Brother     Cancer Brother         esophageal    Diabetes Brother     OSTEOARTHRITIS Brother       Review of Systems  A comprehensive review of systems was negative except for that written in the HPI. Objective:     No data found. There were no vitals taken for this visit. General:  Alert, cooperative, no distress, appears stated age. Head:  Normocephalic, without obvious abnormality, atraumatic.    Eyes: Conjunctivae/corneas clear. PERRL, EOMs intact. Ears:  Normal TMs and external ear canals both ears. Nose: Nares normal. Septum midline. Mucosa normal. No drainage or sinus tenderness. Throat: Lips, mucosa, and tongue normal. Teeth and gums normal.   Neck: Supple, symmetrical, trachea midline, no adenopathy, thyroid: no enlargement/tenderness/nodules, no carotid bruit and no JVD. Back:   Symmetric, no curvature. ROM normal. No CVA tenderness. Lungs:   Clear to auscultation bilaterally. Chest wall:  No tenderness or deformity. Heart:  Regular rate and rhythm, S1, S2, no murmur, click, rub or gallop. Abdomen:   Soft, non-tender. Bowel sounds normal. No masses,  No organomegaly. Extremities: Extremities normal except   Previous physical exam of bilateral shoulders shows /160, good strength external rotation, normal skin, normal pulse. , atraumatic, no cyanosis or edema. Pulses: 2+ and symmetric all extremities. Skin: Skin color, texture, turgor normal. No rashes or lesions   Lymph nodes: Cervical, supraclavicular, and axillary nodes normal.   Neurologic: CNII-XII intact. Neurovascular exam intact in distal extremities        Imaging Review  I have personally and independently reviewed MRI and the report of L shoulder on 11/30/2021, which reveals grade 4 osteoarthritis, acute full-thickness rotator cuff tear of the supraspinatus and chronic rotator cuff tear of the subscapularis. Assessment:     Active Problems:    Right rotator cuff tear arthropathy (1/3/2022)        Plan:   Pastor Humphreys has rotator cuff arthropathy on his L shoulder. I discussed the natural history and natural progression of diagnoses. I reviewed the patient's medical record, previous office notes, and  discussed findings, imaging, impression, treatment options, and plan with the patient.  Treatment options discussed to include no intervention, prescription strength oral anti-inflammatories, cortisone injections, oral steroids, therapy, MRI, and surgery.      This is acute worsening of chronic problem. Discussed option of no intervention at this time. Pros and cons of no intervention was discussed. Surgical intervention was discussed and includes reverse shoulder arthroplasty vs arthroscopic rotator cuff repair. Pros, cons, and recovery time of both surgeries was discussed. Discussed that reverse shoulder arthroplasty is durable and has a short recovery time. Discussed that reverse shoulder arthroplasty may limit some ROM including external rotation movements and behind the back movements.      I recommended a reverse shoulder arthroplasty due to his living situation and at home care needs.      After discussion and answering questions, it was elected to proceed with reverse shoulder arthroplasty. Handout on surgery was given. Follow up for surgery.      Operative and non-operative treatments have been discussed with the patient including risks and benefits of each. After consideration of risks, benefits limitations to the consented procedures and alternative options for treatment, the patient has consented to surgical interventions. Questions were answered and Pre-op teaching was completed.       ANABELLA Bach

## 2022-01-04 LAB
SARS-COV-2, XPLCVT: NOT DETECTED
SOURCE, COVRS: NORMAL

## 2022-01-06 ENCOUNTER — ANESTHESIA EVENT (OUTPATIENT)
Dept: SURGERY | Age: 87
End: 2022-01-06
Payer: MEDICARE

## 2022-01-06 ENCOUNTER — HOSPITAL ENCOUNTER (OUTPATIENT)
Dept: SURGERY | Age: 87
Setting detail: OUTPATIENT SURGERY
Discharge: HOME | End: 2022-01-06
Payer: MEDICARE

## 2022-01-06 VITALS — TEMPERATURE: 97.5 F

## 2022-01-06 LAB
ANION GAP SERPL CALC-SCNC: 6 MMOL/L (ref 5–15)
APPEARANCE UR: CLEAR
BACTERIA URNS QL MICRO: NEGATIVE /HPF
BILIRUB UR QL: NEGATIVE
BUN SERPL-MCNC: 17 MG/DL (ref 6–20)
BUN/CREAT SERPL: 16 (ref 12–20)
CALCIUM SERPL-MCNC: 8.8 MG/DL (ref 8.5–10.1)
CHLORIDE SERPL-SCNC: 102 MMOL/L (ref 97–108)
CO2 SERPL-SCNC: 25 MMOL/L (ref 21–32)
COLOR UR: NORMAL
CREAT SERPL-MCNC: 1.08 MG/DL (ref 0.7–1.3)
EPITH CASTS URNS QL MICRO: NORMAL /LPF
GLUCOSE SERPL-MCNC: 117 MG/DL (ref 65–100)
GLUCOSE UR STRIP.AUTO-MCNC: NEGATIVE MG/DL
HGB UR QL STRIP: NEGATIVE
HYALINE CASTS URNS QL MICRO: NORMAL /LPF (ref 0–5)
KETONES UR QL STRIP.AUTO: NEGATIVE MG/DL
LEUKOCYTE ESTERASE UR QL STRIP.AUTO: NEGATIVE
NITRITE UR QL STRIP.AUTO: NEGATIVE
PH UR STRIP: 5.5 [PH] (ref 5–8)
POTASSIUM SERPL-SCNC: 4.3 MMOL/L (ref 3.5–5.1)
PROT UR STRIP-MCNC: NEGATIVE MG/DL
RBC #/AREA URNS HPF: NORMAL /HPF (ref 0–5)
SODIUM SERPL-SCNC: 133 MMOL/L (ref 136–145)
SP GR UR REFRACTOMETRY: 1.01 (ref 1–1.03)
UA: UC IF INDICATED,UAUC: NORMAL
UROBILINOGEN UR QL STRIP.AUTO: 0.2 EU/DL (ref 0.2–1)
WBC URNS QL MICRO: NORMAL /HPF (ref 0–4)

## 2022-01-06 PROCEDURE — 80048 BASIC METABOLIC PNL TOTAL CA: CPT

## 2022-01-06 PROCEDURE — 81001 URINALYSIS AUTO W/SCOPE: CPT

## 2022-01-06 NOTE — PERIOP NOTES
Spoke to pt to advise of arrival time for procedure for tomorrow. Pt states he has had urinary frequency and slight burning with urination since yesterday. Spoke to Dr Becky Scanlon and pt is coming in this afternoon to have urinalysis completed by ASGLADYS PENA

## 2022-01-07 ENCOUNTER — ANESTHESIA (OUTPATIENT)
Dept: SURGERY | Age: 87
End: 2022-01-07
Payer: MEDICARE

## 2022-01-07 ENCOUNTER — HOSPITAL ENCOUNTER (OUTPATIENT)
Age: 87
Setting detail: OUTPATIENT SURGERY
Discharge: HOME | End: 2022-01-07
Attending: ORTHOPAEDIC SURGERY | Admitting: ORTHOPAEDIC SURGERY
Payer: MEDICARE

## 2022-01-07 VITALS
TEMPERATURE: 98.2 F | HEART RATE: 99 BPM | RESPIRATION RATE: 16 BRPM | WEIGHT: 150 LBS | HEIGHT: 66 IN | DIASTOLIC BLOOD PRESSURE: 73 MMHG | BODY MASS INDEX: 24.11 KG/M2 | SYSTOLIC BLOOD PRESSURE: 118 MMHG | OXYGEN SATURATION: 93 %

## 2022-01-07 DIAGNOSIS — M19.012 PRIMARY OSTEOARTHRITIS OF LEFT SHOULDER: Primary | ICD-10-CM

## 2022-01-07 PROCEDURE — 77030008684 HC TU ET CUF COVD -B: Performed by: NURSE ANESTHETIST, CERTIFIED REGISTERED

## 2022-01-07 PROCEDURE — 74011250636 HC RX REV CODE- 250/636: Performed by: NURSE ANESTHETIST, CERTIFIED REGISTERED

## 2022-01-07 PROCEDURE — 77030002922 HC SUT FBRWRE ARTH -B: Performed by: ORTHOPAEDIC SURGERY

## 2022-01-07 PROCEDURE — 74011000250 HC RX REV CODE- 250: Performed by: ORTHOPAEDIC SURGERY

## 2022-01-07 PROCEDURE — 74011250636 HC RX REV CODE- 250/636: Performed by: ANESTHESIOLOGY

## 2022-01-07 PROCEDURE — 77030033138 HC SUT PGA STRATFX J&J -B: Performed by: ORTHOPAEDIC SURGERY

## 2022-01-07 PROCEDURE — 74011250636 HC RX REV CODE- 250/636

## 2022-01-07 PROCEDURE — 77030010507 HC ADH SKN DERMBND J&J -B: Performed by: ORTHOPAEDIC SURGERY

## 2022-01-07 PROCEDURE — 77030010782 HC BOWL MX BN ENCR -B: Performed by: ORTHOPAEDIC SURGERY

## 2022-01-07 PROCEDURE — 77030019908 HC STETH ESOPH SIMS -A: Performed by: NURSE ANESTHETIST, CERTIFIED REGISTERED

## 2022-01-07 PROCEDURE — 76060000063 HC AMB SURG ANES 1.5 TO 2 HR: Performed by: ORTHOPAEDIC SURGERY

## 2022-01-07 PROCEDURE — 76210000037 HC AMBSU PH I REC 2 TO 2.5 HR: Performed by: ORTHOPAEDIC SURGERY

## 2022-01-07 PROCEDURE — 77030006835 HC BLD SAW SAG STRY -B: Performed by: ORTHOPAEDIC SURGERY

## 2022-01-07 PROCEDURE — C9290 INJ, BUPIVACAINE LIPOSOME: HCPCS | Performed by: ANESTHESIOLOGY

## 2022-01-07 PROCEDURE — 76030000020 HC AMB SURG 1.5 TO 2 HR INTENSV-TIER 1: Performed by: ORTHOPAEDIC SURGERY

## 2022-01-07 PROCEDURE — 76210000057 HC AMBSU PH II REC 1 TO 1.5 HR: Performed by: ORTHOPAEDIC SURGERY

## 2022-01-07 PROCEDURE — 77030031139 HC SUT VCRL2 J&J -A: Performed by: ORTHOPAEDIC SURGERY

## 2022-01-07 PROCEDURE — 74011000250 HC RX REV CODE- 250: Performed by: NURSE ANESTHETIST, CERTIFIED REGISTERED

## 2022-01-07 PROCEDURE — C9290 INJ, BUPIVACAINE LIPOSOME: HCPCS | Performed by: ORTHOPAEDIC SURGERY

## 2022-01-07 PROCEDURE — C1776 JOINT DEVICE (IMPLANTABLE): HCPCS | Performed by: ORTHOPAEDIC SURGERY

## 2022-01-07 PROCEDURE — 74011000250 HC RX REV CODE- 250: Performed by: ANESTHESIOLOGY

## 2022-01-07 PROCEDURE — 2709999900 HC NON-CHARGEABLE SUPPLY: Performed by: ORTHOPAEDIC SURGERY

## 2022-01-07 PROCEDURE — 77030026438 HC STYL ET INTUB CARD -A: Performed by: NURSE ANESTHETIST, CERTIFIED REGISTERED

## 2022-01-07 PROCEDURE — 77030012935 HC DRSG AQUACEL BMS -B: Performed by: ORTHOPAEDIC SURGERY

## 2022-01-07 PROCEDURE — 74011000258 HC RX REV CODE- 258

## 2022-01-07 PROCEDURE — 74011250636 HC RX REV CODE- 250/636: Performed by: ORTHOPAEDIC SURGERY

## 2022-01-07 PROCEDURE — 77030018673: Performed by: ORTHOPAEDIC SURGERY

## 2022-01-07 PROCEDURE — C1713 ANCHOR/SCREW BN/BN,TIS/BN: HCPCS | Performed by: ORTHOPAEDIC SURGERY

## 2022-01-07 DEVICE — COBALT G-HV BONE CEMENT 40GM
Type: IMPLANTABLE DEVICE | Site: SHOULDER | Status: FUNCTIONAL
Brand: DJO SURGICAL

## 2022-01-07 DEVICE — BASEPLATE, GLENOID HA-COAT, RSP, 6.5MM X 30MM
Type: IMPLANTABLE DEVICE | Site: SHOULDER | Status: FUNCTIONAL
Brand: DJO SURGICAL

## 2022-01-07 DEVICE — RSP, PRIMARY HUMERAL MONOBLOCK, 8MM X 108
Type: IMPLANTABLE DEVICE | Site: SHOULDER | Status: FUNCTIONAL
Brand: DJO SURGICAL

## 2022-01-07 DEVICE — IMPL CAPPED SHOULDER S3 TOTAL ADV REVERSE DJO: Type: IMPLANTABLE DEVICE | Status: FUNCTIONAL

## 2022-01-07 DEVICE — SCREW, LOCKING BONE, RSP, 5X14
Type: IMPLANTABLE DEVICE | Site: SHOULDER | Status: FUNCTIONAL
Brand: DJO SURGICAL

## 2022-01-07 DEVICE — GLENOID, HEAD W/RETAINING SCREW, RSP, 36MM, NEUTRAL
Type: IMPLANTABLE DEVICE | Site: SHOULDER | Status: FUNCTIONAL
Brand: DJO SURGICAL

## 2022-01-07 DEVICE — RSP STANDARD HUMERAL SOCKET INSERT, 36MM, HXE-PLUS
Type: IMPLANTABLE DEVICE | Site: SHOULDER | Status: FUNCTIONAL
Brand: DJO SURGICAL

## 2022-01-07 DEVICE — SCREW, LOCKING BONE, RSP, 5X18
Type: IMPLANTABLE DEVICE | Site: SHOULDER | Status: FUNCTIONAL
Brand: DJO SURGICAL

## 2022-01-07 DEVICE — PLUG, CEMENT SZ. 12.0
Type: IMPLANTABLE DEVICE | Site: SHOULDER | Status: FUNCTIONAL
Brand: DJO SURGICAL

## 2022-01-07 RX ORDER — SODIUM CHLORIDE 9 MG/ML
100 INJECTION, SOLUTION INTRAVENOUS CONTINUOUS
Status: DISCONTINUED | OUTPATIENT
Start: 2022-01-07 | End: 2022-01-07 | Stop reason: HOSPADM

## 2022-01-07 RX ORDER — OXYCODONE HYDROCHLORIDE 5 MG/1
5 TABLET ORAL
Status: DISCONTINUED | OUTPATIENT
Start: 2022-01-07 | End: 2022-01-07 | Stop reason: HOSPADM

## 2022-01-07 RX ORDER — SODIUM CHLORIDE, SODIUM LACTATE, POTASSIUM CHLORIDE, CALCIUM CHLORIDE 600; 310; 30; 20 MG/100ML; MG/100ML; MG/100ML; MG/100ML
25 INJECTION, SOLUTION INTRAVENOUS CONTINUOUS
Status: DISCONTINUED | OUTPATIENT
Start: 2022-01-07 | End: 2022-01-07 | Stop reason: HOSPADM

## 2022-01-07 RX ORDER — SUCCINYLCHOLINE CHLORIDE 20 MG/ML
INJECTION INTRAMUSCULAR; INTRAVENOUS AS NEEDED
Status: DISCONTINUED | OUTPATIENT
Start: 2022-01-07 | End: 2022-01-07 | Stop reason: HOSPADM

## 2022-01-07 RX ORDER — SODIUM CHLORIDE 0.9 % (FLUSH) 0.9 %
5-40 SYRINGE (ML) INJECTION EVERY 8 HOURS
Status: DISCONTINUED | OUTPATIENT
Start: 2022-01-07 | End: 2022-01-07 | Stop reason: HOSPADM

## 2022-01-07 RX ORDER — EPHEDRINE SULFATE/0.9% NACL/PF 50 MG/5 ML
SYRINGE (ML) INTRAVENOUS AS NEEDED
Status: DISCONTINUED | OUTPATIENT
Start: 2022-01-07 | End: 2022-01-07 | Stop reason: HOSPADM

## 2022-01-07 RX ORDER — TRANEXAMIC ACID 100 MG/ML
INJECTION, SOLUTION INTRAVENOUS AS NEEDED
Status: DISCONTINUED | OUTPATIENT
Start: 2022-01-07 | End: 2022-01-07 | Stop reason: HOSPADM

## 2022-01-07 RX ORDER — DULOXETIN HYDROCHLORIDE 30 MG/1
30 CAPSULE, DELAYED RELEASE ORAL DAILY
Status: DISCONTINUED | OUTPATIENT
Start: 2022-01-08 | End: 2022-01-07 | Stop reason: HOSPADM

## 2022-01-07 RX ORDER — SODIUM CHLORIDE 0.9 % (FLUSH) 0.9 %
5-40 SYRINGE (ML) INJECTION AS NEEDED
Status: DISCONTINUED | OUTPATIENT
Start: 2022-01-07 | End: 2022-01-07 | Stop reason: HOSPADM

## 2022-01-07 RX ORDER — BUPIVACAINE HYDROCHLORIDE 5 MG/ML
INJECTION, SOLUTION EPIDURAL; INTRACAUDAL
Status: COMPLETED | OUTPATIENT
Start: 2022-01-07 | End: 2022-01-07

## 2022-01-07 RX ORDER — DEXAMETHASONE SODIUM PHOSPHATE 4 MG/ML
INJECTION, SOLUTION INTRA-ARTICULAR; INTRALESIONAL; INTRAMUSCULAR; INTRAVENOUS; SOFT TISSUE AS NEEDED
Status: DISCONTINUED | OUTPATIENT
Start: 2022-01-07 | End: 2022-01-07 | Stop reason: HOSPADM

## 2022-01-07 RX ORDER — ONDANSETRON 2 MG/ML
4 INJECTION INTRAMUSCULAR; INTRAVENOUS
Status: DISCONTINUED | OUTPATIENT
Start: 2022-01-07 | End: 2022-01-07 | Stop reason: HOSPADM

## 2022-01-07 RX ORDER — AMOXICILLIN 250 MG
1 CAPSULE ORAL 2 TIMES DAILY
Status: DISCONTINUED | OUTPATIENT
Start: 2022-01-07 | End: 2022-01-07 | Stop reason: HOSPADM

## 2022-01-07 RX ORDER — PROPOFOL 10 MG/ML
INJECTION, EMULSION INTRAVENOUS AS NEEDED
Status: DISCONTINUED | OUTPATIENT
Start: 2022-01-07 | End: 2022-01-07 | Stop reason: HOSPADM

## 2022-01-07 RX ORDER — GABAPENTIN 600 MG/1
300 TABLET ORAL DAILY
Status: DISCONTINUED | OUTPATIENT
Start: 2022-01-08 | End: 2022-01-07 | Stop reason: HOSPADM

## 2022-01-07 RX ORDER — SODIUM CHLORIDE 9 MG/ML
INJECTION, SOLUTION INTRAVENOUS
Status: COMPLETED | OUTPATIENT
Start: 2022-01-07 | End: 2022-01-07

## 2022-01-07 RX ORDER — OXYCODONE HYDROCHLORIDE 5 MG/1
5 TABLET ORAL
Qty: 20 TABLET | Refills: 0 | Status: SHIPPED | OUTPATIENT
Start: 2022-01-07 | End: 2022-01-12

## 2022-01-07 RX ORDER — MORPHINE SULFATE 10 MG/ML
2 INJECTION, SOLUTION INTRAMUSCULAR; INTRAVENOUS
Status: DISCONTINUED | OUTPATIENT
Start: 2022-01-07 | End: 2022-01-07 | Stop reason: HOSPADM

## 2022-01-07 RX ORDER — LIDOCAINE HYDROCHLORIDE 20 MG/ML
INJECTION, SOLUTION EPIDURAL; INFILTRATION; INTRACAUDAL; PERINEURAL AS NEEDED
Status: DISCONTINUED | OUTPATIENT
Start: 2022-01-07 | End: 2022-01-07 | Stop reason: HOSPADM

## 2022-01-07 RX ORDER — ONDANSETRON 2 MG/ML
INJECTION INTRAMUSCULAR; INTRAVENOUS AS NEEDED
Status: DISCONTINUED | OUTPATIENT
Start: 2022-01-07 | End: 2022-01-07 | Stop reason: HOSPADM

## 2022-01-07 RX ORDER — PHENYLEPHRINE HCL IN 0.9% NACL 0.4MG/10ML
SYRINGE (ML) INTRAVENOUS AS NEEDED
Status: DISCONTINUED | OUTPATIENT
Start: 2022-01-07 | End: 2022-01-07 | Stop reason: HOSPADM

## 2022-01-07 RX ORDER — TRAZODONE HYDROCHLORIDE 100 MG/1
100 TABLET ORAL
Status: DISCONTINUED | OUTPATIENT
Start: 2022-01-07 | End: 2022-01-07 | Stop reason: HOSPADM

## 2022-01-07 RX ORDER — OXYCODONE HYDROCHLORIDE 5 MG/1
10 TABLET ORAL
Status: DISCONTINUED | OUTPATIENT
Start: 2022-01-07 | End: 2022-01-07 | Stop reason: HOSPADM

## 2022-01-07 RX ORDER — SODIUM CHLORIDE 900 MG/100ML
INJECTION INTRAVENOUS
Status: DISCONTINUED
Start: 2022-01-07 | End: 2022-01-07 | Stop reason: HOSPADM

## 2022-01-07 RX ORDER — OXYCODONE AND ACETAMINOPHEN 5; 325 MG/1; MG/1
1 TABLET ORAL
Status: DISCONTINUED | OUTPATIENT
Start: 2022-01-07 | End: 2022-01-07 | Stop reason: HOSPADM

## 2022-01-07 RX ORDER — POLYETHYLENE GLYCOL 3350 17 G/17G
17 POWDER, FOR SOLUTION ORAL DAILY
Status: DISCONTINUED | OUTPATIENT
Start: 2022-01-08 | End: 2022-01-07 | Stop reason: HOSPADM

## 2022-01-07 RX ORDER — DIPHENHYDRAMINE HYDROCHLORIDE 50 MG/ML
12.5 INJECTION, SOLUTION INTRAMUSCULAR; INTRAVENOUS AS NEEDED
Status: DISCONTINUED | OUTPATIENT
Start: 2022-01-07 | End: 2022-01-07 | Stop reason: HOSPADM

## 2022-01-07 RX ORDER — HYDROMORPHONE HYDROCHLORIDE 1 MG/ML
.2-.5 INJECTION, SOLUTION INTRAMUSCULAR; INTRAVENOUS; SUBCUTANEOUS ONCE
Status: DISCONTINUED | OUTPATIENT
Start: 2022-01-07 | End: 2022-01-07 | Stop reason: HOSPADM

## 2022-01-07 RX ORDER — NALOXONE HYDROCHLORIDE 0.4 MG/ML
0.4 INJECTION, SOLUTION INTRAMUSCULAR; INTRAVENOUS; SUBCUTANEOUS AS NEEDED
Status: DISCONTINUED | OUTPATIENT
Start: 2022-01-07 | End: 2022-01-07 | Stop reason: HOSPADM

## 2022-01-07 RX ORDER — FAMOTIDINE 20 MG/1
20 TABLET, FILM COATED ORAL 2 TIMES DAILY
Status: DISCONTINUED | OUTPATIENT
Start: 2022-01-07 | End: 2022-01-07 | Stop reason: HOSPADM

## 2022-01-07 RX ORDER — TRANEXAMIC ACID 100 MG/ML
INJECTION, SOLUTION INTRAVENOUS
Status: COMPLETED
Start: 2022-01-07 | End: 2022-01-07

## 2022-01-07 RX ORDER — FENTANYL CITRATE 50 UG/ML
25 INJECTION, SOLUTION INTRAMUSCULAR; INTRAVENOUS
Status: DISCONTINUED | OUTPATIENT
Start: 2022-01-07 | End: 2022-01-07 | Stop reason: HOSPADM

## 2022-01-07 RX ORDER — MIDAZOLAM HYDROCHLORIDE 1 MG/ML
INJECTION, SOLUTION INTRAMUSCULAR; INTRAVENOUS AS NEEDED
Status: DISCONTINUED | OUTPATIENT
Start: 2022-01-07 | End: 2022-01-07 | Stop reason: HOSPADM

## 2022-01-07 RX ORDER — MIDAZOLAM HYDROCHLORIDE 1 MG/ML
INJECTION, SOLUTION INTRAMUSCULAR; INTRAVENOUS
Status: COMPLETED
Start: 2022-01-07 | End: 2022-01-07

## 2022-01-07 RX ORDER — ACETAMINOPHEN 500 MG
1000 TABLET ORAL EVERY 6 HOURS
Status: DISCONTINUED | OUTPATIENT
Start: 2022-01-07 | End: 2022-01-07 | Stop reason: HOSPADM

## 2022-01-07 RX ORDER — LIDOCAINE HYDROCHLORIDE 10 MG/ML
0.1 INJECTION, SOLUTION EPIDURAL; INFILTRATION; INTRACAUDAL; PERINEURAL AS NEEDED
Status: DISCONTINUED | OUTPATIENT
Start: 2022-01-07 | End: 2022-01-07 | Stop reason: HOSPADM

## 2022-01-07 RX ORDER — PROMETHAZINE HYDROCHLORIDE 25 MG/ML
2.5 INJECTION, SOLUTION INTRAMUSCULAR; INTRAVENOUS
Status: DISCONTINUED | OUTPATIENT
Start: 2022-01-07 | End: 2022-01-07 | Stop reason: HOSPADM

## 2022-01-07 RX ORDER — PANTOPRAZOLE SODIUM 20 MG/1
20 TABLET, DELAYED RELEASE ORAL
Status: DISCONTINUED | OUTPATIENT
Start: 2022-01-08 | End: 2022-01-07 | Stop reason: HOSPADM

## 2022-01-07 RX ORDER — BUPIVACAINE HYDROCHLORIDE 5 MG/ML
INJECTION, SOLUTION EPIDURAL; INTRACAUDAL
Status: COMPLETED
Start: 2022-01-07 | End: 2022-01-07

## 2022-01-07 RX ORDER — HYDROMORPHONE HYDROCHLORIDE 1 MG/ML
0.5 INJECTION, SOLUTION INTRAMUSCULAR; INTRAVENOUS; SUBCUTANEOUS
Status: DISCONTINUED | OUTPATIENT
Start: 2022-01-07 | End: 2022-01-07 | Stop reason: HOSPADM

## 2022-01-07 RX ORDER — DIPHENHYDRAMINE HYDROCHLORIDE 50 MG/ML
12.5 INJECTION, SOLUTION INTRAMUSCULAR; INTRAVENOUS
Status: DISCONTINUED | OUTPATIENT
Start: 2022-01-07 | End: 2022-01-07 | Stop reason: HOSPADM

## 2022-01-07 RX ORDER — LISINOPRIL 5 MG/1
10 TABLET ORAL
Status: DISCONTINUED | OUTPATIENT
Start: 2022-01-07 | End: 2022-01-07 | Stop reason: HOSPADM

## 2022-01-07 RX ORDER — LIDOCAINE HYDROCHLORIDE AND EPINEPHRINE 10; 10 MG/ML; UG/ML
INJECTION, SOLUTION INFILTRATION; PERINEURAL AS NEEDED
Status: DISCONTINUED | OUTPATIENT
Start: 2022-01-07 | End: 2022-01-07 | Stop reason: HOSPADM

## 2022-01-07 RX ORDER — ONDANSETRON 4 MG/1
4 TABLET, FILM COATED ORAL
Qty: 10 TABLET | Refills: 1 | Status: SHIPPED
Start: 2022-01-07 | End: 2022-06-30

## 2022-01-07 RX ORDER — CAFFEINE CITRATE 20 MG/ML
SOLUTION INTRAVENOUS
Status: DISCONTINUED
Start: 2022-01-07 | End: 2022-01-07 | Stop reason: HOSPADM

## 2022-01-07 RX ADMIN — Medication 10 MG: at 10:55

## 2022-01-07 RX ADMIN — Medication 200 MCG: at 11:36

## 2022-01-07 RX ADMIN — Medication 200 MCG: at 11:31

## 2022-01-07 RX ADMIN — BUPIVACAINE HYDROCHLORIDE 15 ML: 5 INJECTION, SOLUTION EPIDURAL; INTRACAUDAL; PERINEURAL at 10:05

## 2022-01-07 RX ADMIN — LIDOCAINE HYDROCHLORIDE 80 MG: 20 INJECTION, SOLUTION EPIDURAL; INFILTRATION; INTRACAUDAL; PERINEURAL at 10:19

## 2022-01-07 RX ADMIN — MIDAZOLAM HYDROCHLORIDE 1 MG: 1 INJECTION, SOLUTION INTRAMUSCULAR; INTRAVENOUS at 10:01

## 2022-01-07 RX ADMIN — SUCCINYLCHOLINE CHLORIDE 140 MG: 20 INJECTION, SOLUTION INTRAMUSCULAR; INTRAVENOUS at 10:21

## 2022-01-07 RX ADMIN — BUPIVACAINE 10 ML: 13.3 INJECTION, SUSPENSION, LIPOSOMAL INFILTRATION at 10:05

## 2022-01-07 RX ADMIN — ONDANSETRON HYDROCHLORIDE 4 MG: 2 INJECTION, SOLUTION INTRAMUSCULAR; INTRAVENOUS at 11:38

## 2022-01-07 RX ADMIN — PROPOFOL 100 MG: 10 INJECTION, EMULSION INTRAVENOUS at 10:21

## 2022-01-07 RX ADMIN — SODIUM CHLORIDE, POTASSIUM CHLORIDE, SODIUM LACTATE AND CALCIUM CHLORIDE 25 ML/HR: 600; 310; 30; 20 INJECTION, SOLUTION INTRAVENOUS at 09:43

## 2022-01-07 RX ADMIN — TRANEXAMIC ACID 1 G: 100 INJECTION, SOLUTION INTRAVENOUS at 10:30

## 2022-01-07 RX ADMIN — Medication 20 MG: at 11:17

## 2022-01-07 RX ADMIN — WATER 2 G: 1 INJECTION INTRAMUSCULAR; INTRAVENOUS; SUBCUTANEOUS at 10:30

## 2022-01-07 RX ADMIN — DEXAMETHASONE SODIUM PHOSPHATE 8 MG: 4 INJECTION, SOLUTION INTRAMUSCULAR; INTRAVENOUS at 10:34

## 2022-01-07 NOTE — ANESTHESIA PROCEDURE NOTES
Peripheral Block    Start time: 1/7/2022 9:58 AM  End time: 1/7/2022 10:10 AM  Performed by: Natanael Plata MD  Authorized by: Natanael Plata MD       Pre-procedure: Indications: at surgeon's request and post-op pain management    Preanesthetic Checklist: patient identified, risks and benefits discussed, site marked, timeout performed, anesthesia consent given and patient being monitored    Timeout Time: 10:00 EST          Block Type:   Block Type:   Interscalene  Laterality:  Left  Monitoring:  Standard ASA monitoring, responsive to questions and oxygen  Injection Technique:  Single shot  Procedures: ultrasound guided    Prep: chlorhexidine    Location:  Interscalene  Needle Type:  Stimuplex  Needle Localization:  Ultrasound guidance  Medication Injected:  Bupivacaine liposome (PF) susp (EXPAREL) infiltration, 10 mL  bupivacaine (PF) (MARCAINE) 0.5% injection, 15 mL  Med Admin Time: 1/7/2022 10:05 AM    Assessment:  Number of attempts:  1  Injection Assessment:  Incremental injection every 5 mL, no paresthesia, ultrasound image on chart, local visualized surrounding nerve on ultrasound, negative aspiration for blood and no intravascular symptoms  Patient tolerance:  Patient tolerated the procedure well with no immediate complications

## 2022-01-07 NOTE — PERIOP NOTES
Report to JAYDE Cruz RN    [id number] Notified Ellis KYLE that pt ambulating with assistance to BR and taking PO meds. Informed pt can not go to floor until 3pm due to nursing. Girlfriend has read over instructions and states comfortable to take him home. Herminia Soto stated he will call Dr. Francis Kulkarni to assure and call in PO antibiotic.

## 2022-01-07 NOTE — PERIOP NOTES
Permission received to review discharge instructions and discuss private health information with Berenice Solo, friend.

## 2022-01-07 NOTE — OP NOTES
Name:Trev Winslow    Surgery Date: 1/7/2022     Date of Dictation: 1/7/2022    Admitting Pre-OP Diagnosis: LEFT SHOULDER OA    Post-Op Diagnosis: Left Rotator cuff arthropathy shoulder    Primary Procedure: Left Reverse Total Shoulder Arthroplasty including glenosphere, axillary nerve neuroplasty    Surgeon: Manohar Coy M.D. Asst: ANABELLA Cortez    Antibiotics: Weight appropriate IV Ancef    Blood Loss: 50cc    Implants: DJO RSP Reverse Total Shoulder components including glenosphere size 36N glenosphere, 8 stem monoblock, 2 mm standard poly    Specimens: Humeral Head, proximal biceps tendon    Anesthesia: GET with Interscalene Block    Indication: This patient has end-stage osteoarthritis/ rotator cuff arthropathy of the shoulder with failure to relieve symptoms with nonsurgical treatment and comes in for Reverse Shoulder arthroplasty including glenosphere. This is a complex surgical procedure requiring an assistant for patient positioning, for wound closure, but critically for assistance with retraction and exposure of the glenoid to allow for axillary nerve dissection and for exposure of the glenoid which is the critical steps shoulder arthroplasty and an assistant is used. Procedure:  Patient is brought in the operating theater and intubated, was given a preoperative interscalene block with X per all, weight appropriate IV antibiotics. Patient is positioned in the beach chair position, all bony prominences padded, the affected shoulder was prepped and draped and after time out we infiltrated the skin with 1% lidocaine with epi then made a skin incision dissecting down to the cephalic vein. The deltoid and cephalic vein or taken laterally with subdeltoid dissection done and after scar was excised a subdeltoid retractor was placed. The short flexors is flexors were retracted medially.   The fascia just lateral to the short flexors is incised and a retractor placed underneath the subscapularis exposing the 3 vessels at the bottom of the subscapularis. The biceps is removed from the bicipital groove and tenodesed to the subscap tendon using 2. FiberWire sutures, and then the proximal biceps is excised. Then the subscapularis is released off of the lesser tuberosity including an inferior capsule release off the humeral metaphysis just distal to the articular cartilage going all the way back to 7:00 a.m. while the axillary nerve was protected. The status of the rotator cuff is torn subscap supra infra. Glenoid retroversion and superior inclination were measured on preoperative CT scan. With the proximal humerus dislocated a 30 degree retroverted cut is made. Then rigid reamers used up to a size 8 then broaches up to a size 8 in 30 degrees retroversion then a pineapple stick placed with the conical reamers removing proximal humeral bone using the conical reamer small size small then size medium. At this point a Alia Chance as placed posterior inferiorly displacing the proximal humerus exposing the glenoid. Traction is placed on the subscap rolled edge and the interval developed behind the subscap releasing the anterior capsule down to 5:00 a.m. Lety Vincent The nerve is then dissected from the inferior subscap to the posterior humeral shaft and after this dissection is completed in the nerve is fully exposed we were able to excise the inferior and anterior capsule by putting Ritu Hernandez retractors on the capsule peeling the soft tissues off of the capsule and then excising the capsule using curved Ureña scissors and a Bovie on the rim of the glenoid. This is the critical step in shoulder arthroplasty that allows for posterior subluxation of the proximal humerus out of the way to allow for appropriate full exposure of the glenoid. The labrum is excised 360 degrees around the glenoid.   At this point using the preoperative CT measurements a central guide pin is placed so that the net inclination of the glenosphere old be 10 degrees below neutral.  These calculations are made using preoperative CT measurements so that the total inclination is 10 degrees down. Retroversion is corrected to neutral.  Small medium and large reamers were used, soft tissue removed from the appropriate the glenoid, then the base plate screwed into place and locking screws placed. Then the glenosphere is placed onto the base plate with a locking screw. Attention is then turned back to the proximal humerus, the size 8 mono block stem cemented in place and once the cement his dry humeral poly inserts trialed until the correct soft tissue tensions obtained without impingement with good soft tissue stability. Copiously irrigated with bacitracin saline pulsatile lavage, Exparel infiltrated into the soft tissues including into the posterior capsule. Incision is closed in layers patient taken to recovery room in stable condition.

## 2022-01-07 NOTE — PERIOP NOTES
1230-Assumed care of pt, pt tolerating ginger ale without difficulty. VSS      1330-pt voided without difficulty after ambulating to bathroom    1400-Pt sitting up in wheelchair, pt verbalizing feeling a little dizzy, blood pressure was low at 100/65, will restart IV fluids and notify Patricia Langford if pt needs to stay after all. Discussed with Patricia Langford he will phone in antibiotic to pharmacy, pt will need to take for next few days and if friend is spending the night pt can be discharged home if blood pressure remains stable and no further dizziness noted. 1418-Pts friend receiving d/c instructions at this time. Reviewed when to call the doctor, diet, activity and hygiene. Reviewed when and what to give for pain meds, reviewed use of ice pack and sling and how to operate. Reviewed pt will need to take antibiotics orally for the next 3 to 4 days. 1425-Pt received an additional 500ml of LR. Pt reports no further dizziness, blood pressure at pt's baseline, discussed with anesthesia,  pt cleared for d/c home. 1500-D/c instructions completed, also demonstrated how to remove sling and use of ice pack. 1525-Transported via w/c to awaiting transportation. No complaints noted at time of d/c home.   Pt voided again prior to d/c home

## 2022-01-07 NOTE — DISCHARGE INSTRUCTIONS
Exparel is a numbing medication used in your nerve block to help with post-operative pain control. Expect the effects of numbing to last for approximately 72 hours (3 days). You may have control of arm in 24 hours. You will be wearing an Exparel wristband to alert the Emergency Department and/or hospital that you have received this medication if need to be seen. Do not remove the wristband until you have full feeling in area. When you start to have discomfort in your surgical arm, begin Tylenol and take according to your discharge instructions. When you start to feel PAIN, begin to take the Oxycodone WITH FOOD and take according to your discharge instructions. Logan Stanford M.D.           Knee & Shoulder Surgery           Sports Medicine             Discharge Instructions: How to [address]  Surgery: Shoulder Arthroplasty  Surgeon:   Rogers Donovan MD  Surgery Date:  1/7/2022  Direct [phone], Office [phone]    Going Home from the [address] [place] [address] will go home with a sling to immobilize and support your arm while the nerve block is active. You can let your arm hang loosely by your side and extend your elbow. You may gently raise your surgical arm with the help of your good arm. You may also perform Codman dangles and shoulder blade pinching exercises as instructed. Do not put weight on the affected arm. Do not lean on it, and do not hold objects with that hand. In general for the first week keep arm in sling, rest, perform simple stretching exercises and ice your shoulder. Common Post-op Concerns     Hand swelling: Adjust sling to fit with hand slightly above elbow, squeeze a stress ball and do gentle biceps curls to help pump the fluid out of your arm. The forearm will be wrapped with a brown compression wrap, leave this on for 48 hours to help reduce the forearm swelling.  You may remove before showering or sooner if the wrap is uncomfortably tight. Bruising: Very common and will subside over 2-3 weeks. Nausea: A common side effect of anesthesia, narcotic medicine, and pain. Take Zofran as needed. Decrease pain medicines when possible. Fever: Somewhat common the first 3 days after surgery, take Tylenol as needed. Sling  The sling is used to immobilize your shoulder while your block is active and for comfort the first 2-3 weeks after surgery. The sling in general is optional and available for you to use as neded. You may need to adjust the front shoulder strap so that your hand is slightly above your elbow, this will prevent some of the hand swelling that is common after surgery. You may remove the sling when sitting down and allow your arm to rest in your lap. You may take the sling off to shower. To relieve pain:   Use ice/gel packs.  Put the ice pack directly over the wound, or anywhere you are hurting or swollen.  To control pain and swelling, keep ice on regularly, especially after physical activity.  The packs should stay cold for 3-4 hours. When it is not cold anymore, rotate with the packs in the freezer.  Rest for at least 20 minutes between activity or exercises.  You will be sent home with pain medicine. Typically we use oxycodone unless you have an allergy. We encourage Tylenol 1000mg every 6 hours for the first week and then pain medicine when needed.  To keep track of your pain medications, write down what you take and when you take it.  The last dose of pain medication you got in the hospital was:       Medication    Dose    Date & Time             Choose your medications based on the pain scale below:     To keep your pain under control, take Tylenol every 6 hours for 14 days - even if you feel like you dont need it.  For mild to moderate pain (1-6 on pain scale), take one pain pill every 3-4 hours as needed.      For severe pain (7-10 on pain scale), take two pain pills every 3-4 hours as needed.  To prevent nausea, take your pain medications with food. Pain Scale                   As your pain lessens:     Slowly start taking less pain medication. You may do this by waiting longer between doses or by taking smaller doses.  Stop using the pain medications as soon as you no longer need it, usually in 2-3 weeks.  Generally after shoulder surgery, ambulation or walking around and being active is the best prevention for blood clots.  If you are at risk for blood clots due to your inability to walk around or have had blood clots in the past you may take Aspirin 325 mg once a day for 2-3 weeks to lower the chance of developing a blood clot.  If you have a history of GI upset or bleeding:   Do not take non-steroidal anti-inflammatory medications (Ibuprofen, Advil, Motrin, Naproxen, etc.)    Take Pepcid 20 mg twice a day, or a similar home medication, while you are taking a blood thinner. OPSITE (Honeycomb dressing)     Keep your waterproof dressing in place for 7 days. Please remove the dressing after 7 days but leave the surgical glue covering the wound in place until your first post-op appointment.  With your waterproof dressing intact you may shower 48 hours after surgery.  If there is no more drainage from the wound, you may leave it open to the air.  You may remove the brown compression wrap on your forearm after 48 hours, sooner if the wrap is uncomfortably tight.  If you have staples they will be removed at your 1st postop appointment usually about 10 days after surgery.  To increase and promote healing:   Stop Smoking (or at least cut back on       Smoking).  Eat a well-balanced diet (high in protein       and vitamin C).  If you have a poor appetite, drink Ensure, Glucerna, or         Lindsay Instant Breakfast for the next 30 days.      If you are diabetic, you should control your blood         sugars to prevent infection and help your wound         to heal.                         To prevent constipation, stay active and drink plenty of fluid.  While using pain medications, you should also take stool softeners and laxatives, such as Pericolace and Miralax.  If you are having too many bowel       Movements, then you may need       to stop taking the laxatives.  You should have a bowel movement 3-4 days        after surgery and then at least every other day while       on pain medication.  Take short walks (in your home)         about every 1-2 hours during the day.  Try to increase how far you walk each day.     o NO DRIVING for the first week after surgery. If you are no longer using narcotic pain medicine you may begin driving.  Please call your physician immediately if you have:     Constant bleeding from your wound.  Increasing redness or swelling around your wound (Some warmth, bruising and swelling is normal).  Change in wound drainage (increase in amount, color, or bad smell).  Change in mental status (unusual behavior   Temperature over 101.5 degrees Fahrenheit      Pain or redness in the calf (back of your lower leg)     Increased swelling of the thigh, ankle, calf, or foot.  Emergency: CALL 911 if you have:     Shortness of breath     Chest pain when you cough or taking a deep breath          A follow up appointment card will be given to you or your family member after the surgery. If you are not aware of your follow up time or lost the card, please call the office at [phone].  If you have questions or concerns during normal business hours, you may reach Dr. Shady Cesar team at [phone]. If you have immediate concerns or questions you may call the office and reach Dr Shady Cesar or another [address] [place] [address] provider who is on call.  [phone]      TAKE NARCOTIC PAIN MEDICATIONS WITH FOOD! Narcotics tend to be constipating and we recommend taking a stool softener such as Colace or Miralax (follow package instructions). If you were given prescriptions, please review the written information on the prescribed medications. DO NOT DRIVE WHILE TAKING NARCOTIC PAIN MEDICATIONS. CPAP PATIENTS BE SURE TO WEAR MACHINE WHENEVER NAPPING OR SLEEPING DAY/NIGHT OF SURGERY! DISCHARGE SUMMARY from Nurse    The following personal items collected during your admission are returned to you:   Dental Appliance: Dental Appliances: Uppers,Lowers,Partials (PACU)  Vision: Visual Aid: Glasses,At home  Hearing Aid:    Jewelry: Jewelry: None  Clothing: Clothing: With patient  Other Valuables: Other Valuables: Cell Phone,Other (comment),With patient (cell phone with pt, hearing aids in PACU)  Valuables sent to safe:        PATIENT INSTRUCTIONS:    After General Anesthesia or Intravenous Sedation, for 24 hours or while taking prescription Narcotics:  · Someone should be with you for the next 24 hours. · For your own safety, a responsible adult must drive you home. · Limit your activities  · Recommended activity: Rest today, up with assistance today. Do not climb stairs or shower unattended for the next 24 hours. · Start with a soft bland diet and advance as tolerated (no nausea) to regular diet. · If you have a sore throat some things that may help are: fluids, warm salt water gargle, or throat lozenges. If this does not improve after several days please follow up with your family physician. · Do not drive and operate hazardous machinery  · Do not make important personal or business decisions  · Do  not drink alcoholic beverages  · If you have not urinated within 8 hours after discharge, please contact your surgeon on call.       Report the following to your surgeon:  · Excessive pain, swelling, redness or odor of or around the surgical area  · Temperature over 100.5  · Nausea and vomiting lasting longer than 4 hours or if unable to take medications  · Any signs of decreased circulation or nerve impairment to extremity: change in color, persistent  numbness, tingling, coldness or increase pain    · If you received an upper extremity nerve block, please wear your sling until the block has worn off, then refer to your surgeons post-operative instructions. If you have had a shoulder block or a block near your collar bone, you may have symptoms such as:          1. Mild shortness of breath        2. A hoarse voice        3. Blurry vision        4. Unequal pupils        5. Drooping of your face on the same side as the nerve block. These symptoms will disappear as the nerve block wears off. · You will receive a Post Operative Call from one of the Recovery Room Nurses on the day after your surgery to check on you. It is very important for us to know how you are recovering after your surgery. If you have an issue please call your surgeon, do not wait for the post operative call. · You may receive an e-mail or letter in the mail from [employer] regarding your experience with us in the Ambulatory Surgery Unit. Your feedback is valuable to us and we appreciate your participation in the survey. ·   · If the above instructions are not adequate or you are having problems after your surgery, call your physician at their office number. ·   · We wish youre a speedy recovery ? What to do at Home:    *  Please give a list of your current medications to your Primary Care Provider. *  Please update this list whenever your medications are discontinued, doses are      changed, or new medications (including over-the-counter products) are added. *  Please carry medication information at all times in case of emergency situations. If you have not had your influenza or pneumococcal vaccines, please follow up with your primary care physician.     The discharge information has been reviewed with the patient and caregiver. The patient and caregiver verbalized understanding.

## 2022-01-07 NOTE — ANESTHESIA PREPROCEDURE EVALUATION
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)  Cerebral microvascular disease   Cerebral infarction involving right carotid artery     H/o Cervical fusion    H/O Lumbar back pain with radiculopathy affecting left lower extremity s/p lumbar laminectomy  Lumbar post-laminectomy syndrome/ chronic back pain  Left homonymous hemianopsia Cardiovascular    Hypertension: well controlled          PAD    Exercise tolerance: >4 METS  Comments: Bilateral carotid artery stenosis  Lymphedema of left leg    12/21 ECG = SB, 1st degree AVB, septal infarct  Cardiology consult clears pt at low risk for procedure   GI/Hepatic/Renal     GERD: well controlled           Endo/Other    Diabetes (borderline)    Arthritis and cancer    Comments: H/O Prostate Cancer s/p Prostatectomy  Borderline Diabetes  H/O Bladder Cancer Other Findings   Comments: Left shoulder osteoarthritis    Burning with urination: neg UA  [place]         Physical Exam    Airway  Mallampati: I  TM Distance: 4 - 6 cm  Neck ROM: normal range of motion   Mouth opening: Normal     Cardiovascular    Rhythm: regular  Rate: normal         Dental    Dentition: Full upper dentures and Lower partial plate     Pulmonary  Breath sounds clear to auscultation               Abdominal  GI exam deferred       Other Findings            Anesthetic Plan    ASA: 3  Anesthesia type: general and regional    Monitoring Plan: BIS  Post-op pain plan if not by surgeon: peripheral nerve block single    Induction: Intravenous  Anesthetic plan and risks discussed with: Patient      Repeat Na+ 133.  Negative U/A

## 2022-01-07 NOTE — ANESTHESIA POSTPROCEDURE EVALUATION
Procedure(s):  LEFT SHOULDER REVERSE ARTHROPLASTY, AXILLARY NERVE NEUROPLASTY. general, regional    Anesthesia Post Evaluation      Multimodal analgesia: multimodal analgesia used between 6 hours prior to anesthesia start to PACU discharge  Patient location during evaluation: PACU  Patient participation: complete - patient participated  Level of consciousness: awake and alert  Pain score: 0  Airway patency: patent  Anesthetic complications: no  Cardiovascular status: acceptable  Respiratory status: acceptable  Hydration status: acceptable  Comments: Pt has interscalene block with Exparel. Sling postop.    Post anesthesia nausea and vomiting:  none  Final Post Anesthesia Temperature Assessment:  Normothermia (36.0-37.5 degrees C)      INITIAL Post-op Vital signs:   Vitals Value Taken Time   /67 01/07/22 1345   Temp 36.8 °C (98.2 °F) 01/07/22 1300   Pulse 100 01/07/22 1345   Resp 16 01/07/22 1345   SpO2 92 % 01/07/22 1345

## 2022-01-07 NOTE — BRIEF OP NOTE
Brief Postoperative Note    Patient: Kwaku Rahman  YOB: 1935  MRN: [id number]    Date of Procedure: 1/7/2022     Pre-Op Diagnosis: LEFT SHOULDER OA    Post-Op Diagnosis: Same as preoperative diagnosis. Procedure(s):  LEFT SHOULDER REVERSE ARTHROPLASTY, AXILLARY NERVE NEUROPLASTY    Surgeon(s):  Sherrell Barrett MD    Surgical Assistant: None    Anesthesia: General     Estimated Blood Loss (mL): less than 268     Complications: None    Specimens: * No specimens in log *     Implants:   Implant Name Type Inv.  Item Serial No.  Lot No. LRB No. Used Action   CEMENT BNE 40GM W/ GENT HI VISC CO - [id number]  CEMENT BNE 40GM W/ GENT HI VISC CO [id number] [facility] NA Left 1 Implanted   BASEPLATE ESTHELA [id number] HA SHLDR RSP - SNA  BASEPLATE ESTHELA [id number] HA SHLDR RSP NA [facility] [id number] Left 1 Implanted   HEAD ESTHELA 36MM NEUT W/ RET SCR RSP - SNA  HEAD ESTHELA 36MM NEUT W/ RET SCR RSP NA [facility] [id number] Left 1 Implanted   SCREW BNE L18MM DIA5MM TI NONLOCKING FOR ESTHELA BASEPLT FIX - SN/A  SCREW BNE L18MM DIA5MM TI NONLOCKING FOR ESTHELA BASEPLT FIX N/A [facility] [id number] Left 1 Implanted   SCREW BNE L18MM DIA5MM TI NONLOCKING FOR ESTHELA BASEPLT FIX - SN/A  SCREW BNE L18MM DIA5MM TI NONLOCKING FOR ESTHELA BASEPLT FIX N/A [facility] [id number] Left 1 Implanted   SCREW BNE L14MM DIA5MM TI NONLOCKING FOR ESTHELA BASEPLT FIX - SN/A  SCREW BNE L14MM DIA5MM TI NONLOCKING FOR ESTHELA BASEPLT FIX N/A [facility] [id number] Left 1 Implanted   STEM HUM MONOBLOCK 8X108 MM PRIMARY RSP - SN/A  STEM HUM MONOBLOCK 8X108 MM PRIMARY RSP N/A [facility] [id number] Left 1 Implanted   RESTRICTOR VIRIDIANA [id number] BIOABSRB HIP PLUG CLEARCUT - SN/A  RESTRICTOR VIRIDIANA [id number] BIOABSRB HIP PLUG CLEARCUT N/A [facility] [id number] Left 1 Implanted   INSERT HUM SOCKET STD 36 MM E+ POLYETH ALTIVATE RVS RSP - SN/A  INSERT HUM SOCKET STD 36 MM E+ POLYETH ALTIVATE RVS RSP N/A [facility] MEDICAL - [facility] SURGICAL_WD [id number] Left 1 Implanted       Drains: * No LDAs found *    Findings: see op note    Electronically Signed by ANABELLA Hassan on 1/7/2022 at 10:00 AM

## 2022-01-07 NOTE — PERIOP NOTES
[place]  1935  [id number]    Situation:  Verbal report given from: RN and CRNA  Procedure: Procedure(s):  LEFT SHOULDER REVERSE ARTHROPLASTY, AXILLARY NERVE NEUROPLASTY    Background:    Preoperative diagnosis: LEFT SHOULDER OA    Postoperative diagnosis: LEFT SHOULDER OA    :  Dr. Riana Ellis): Circ-1: Arlyn Garcia  Circ-2: Stephen Good RN  Physician Assistant: ANABELLA Ellis  Scrub Tech-1: Nan Song    Specimens: * No specimens in log *    Assessment:  Intra-procedure medications         Anesthesia gave intra-procedure sedation and medications, see anesthesia flow sheet     Intravenous fluids: LR@ KVO     Vital signs stable.  Pt denies pain or chill      Recommendation:    Permission to share finding with girlfriend : yes

## 2022-01-07 NOTE — PERIOP NOTES
Dr. Rebecca Riggins performed nerve block in preop using ultrasound machine to LUE. Pt on CM x3, 02 by NC at 3L, patient responsive when spoken to. Able to answer questions appropriately. Pt did receive Versed 1 mg IV given by Dr. Leidy Buchanan for sedation. Pt tolerated procedure well. VSS and will continue to monitor.

## 2022-03-18 PROBLEM — M75.101 RIGHT ROTATOR CUFF TEAR ARTHROPATHY: Status: ACTIVE | Noted: 2022-01-03

## 2022-03-18 PROBLEM — I67.89 CEREBRAL MICROVASCULAR DISEASE: Status: ACTIVE | Noted: 2018-07-26

## 2022-03-18 PROBLEM — M12.811 RIGHT ROTATOR CUFF TEAR ARTHROPATHY: Status: ACTIVE | Noted: 2022-01-03

## 2022-03-19 PROBLEM — I65.23 BILATERAL CAROTID ARTERY STENOSIS: Status: ACTIVE | Noted: 2018-07-09

## 2022-03-19 PROBLEM — H91.13 PRESBYCUSIS OF BOTH EARS: Status: ACTIVE | Noted: 2020-08-27

## 2022-03-19 PROBLEM — I63.231 CEREBRAL INFARCTION INVOLVING RIGHT CAROTID ARTERY (HCC): Status: ACTIVE | Noted: 2020-08-27

## 2022-03-19 PROBLEM — M19.012 OSTEOARTHRITIS OF LEFT SHOULDER: Status: ACTIVE | Noted: 2022-01-07

## 2022-06-30 RX ORDER — LIFITEGRAST 50 MG/ML
1 SOLUTION/ DROPS OPHTHALMIC 2 TIMES DAILY
COMMUNITY

## 2022-06-30 NOTE — PERIOP NOTES
[facility]  Ambulatory Surgery Unit  Pre-operative Instructions    Procedure Date  Tuesday July 12th            Tentative Arrival Time 9:30 am      1. On the day of your procedure, please report to the Ambulatory Surgery Unit Registration Desk and sign in at your designated time. The Ambulatory Surgery Unit is located in [facility] on the Critical access hospital side of the [facility] across from the [facility]. Please have all of your health insurance cards, copayment, and a photo ID.    **TWO adults may accompany you the day of the procedure. We have limited seating available. If our waiting room is at capacity, your ride may be asked to remain in their vehicle. No one under 15 is allowed in the waiting room. Masks, fully covering the mouth and nose, are required in the waiting room. 2. You must have someone with you to drive you home as directed by your surgeon. 3. You may have a light breakfast and take normal morning medications. 4. We recommend you do not drink any alcoholic beverages for 24 hours before and after your procedure. 5. Contact your surgeons office for instructions on the following medications: non-steroidal anti-inflammatory drugs (i.e. Advil, Aleve), vitamins, and supplements. (Some surgeons will want you to stop these medications prior to surgery and others may allow you to take them)   **If you are currently taking Plavix, Coumadin, Aspirin and/or other blood-thinning agents, contact your surgeon for instructions. ** Your surgeon will partner with the physician prescribing these medications to determine if it is safe to stop or if you need to continue taking. Please do not stop taking these medications without instructions from your surgeon. 6. In an effort to help prevent surgical site infection, we ask that you shower with an anti-bacterial soap (i.e. Dial or Safeguard) on the morning of your procedure.  Do not apply any lotions, powders, or deodorants after showering. 7. Wear comfortable clothes. Wear glasses instead of contacts. Do not bring any jewelry or money (other than copays or fees as instructed). Do not wear make-up, particularly mascara, the morning of your procedure. Wear your hair loose or down, no ponytails, buns, lupillo pins or clips. All body piercings must be removed. 8. You should understand that if you do not follow these instructions your procedure may be cancelled. If your physical condition changes (i.e. fever, cold or flu) please contact your surgeon as soon as possible. 9. It is important that you be on time. If a situation occurs where you may be late, or if you have any questions or problems, please call [phone].    10. Your procedure time may be subject to change. You will receive a phone call the day prior to confirm your arrival time. I understand a pre-operative phone call will be made to verify my procedure time. In the event that I am not available, I give permission for a message to be left on my answering service and/or with another person?       Yes    Reviewed instructions via telephone, patient verbalized understanding         ___________________      ___________________      ___________________  (Signature of Patient)          (Witness)                   (Date and Time)

## 2022-07-12 ENCOUNTER — HOSPITAL ENCOUNTER (OUTPATIENT)
Age: 87
Setting detail: OUTPATIENT SURGERY
Discharge: HOME | End: 2022-07-12
Attending: PHYSICAL MEDICINE & REHABILITATION | Admitting: PHYSICAL MEDICINE & REHABILITATION
Payer: MEDICARE

## 2022-07-12 ENCOUNTER — APPOINTMENT (OUTPATIENT)
Dept: GENERAL RADIOLOGY | Age: 87
End: 2022-07-12
Attending: PHYSICAL MEDICINE & REHABILITATION
Payer: MEDICARE

## 2022-07-12 VITALS
BODY MASS INDEX: 24.11 KG/M2 | HEIGHT: 66 IN | TEMPERATURE: 98.8 F | OXYGEN SATURATION: 96 % | RESPIRATION RATE: 14 BRPM | SYSTOLIC BLOOD PRESSURE: 136 MMHG | WEIGHT: 150 LBS | HEART RATE: 60 BPM | DIASTOLIC BLOOD PRESSURE: 84 MMHG

## 2022-07-12 LAB
GLUCOSE BLD STRIP.AUTO-MCNC: 95 MG/DL (ref 65–117)
SERVICE CMNT-IMP: NORMAL

## 2022-07-12 PROCEDURE — 74011000250 HC RX REV CODE- 250: Performed by: PHYSICAL MEDICINE & REHABILITATION

## 2022-07-12 PROCEDURE — 74011250636 HC RX REV CODE- 250/636: Performed by: PHYSICAL MEDICINE & REHABILITATION

## 2022-07-12 PROCEDURE — 76210000046 HC AMBSU PH II REC FIRST 0.5 HR: Performed by: PHYSICAL MEDICINE & REHABILITATION

## 2022-07-12 PROCEDURE — 2709999900 HC NON-CHARGEABLE SUPPLY: Performed by: PHYSICAL MEDICINE & REHABILITATION

## 2022-07-12 PROCEDURE — 76000 FLUOROSCOPY <1 HR PHYS/QHP: CPT

## 2022-07-12 PROCEDURE — 76030000002 HC AMB SURG OR TIME FIRST 0.: Performed by: PHYSICAL MEDICINE & REHABILITATION

## 2022-07-12 PROCEDURE — 72020 X-RAY EXAM OF SPINE 1 VIEW: CPT

## 2022-07-12 PROCEDURE — 77030003665 HC NDL SPN BBMI -A: Performed by: PHYSICAL MEDICINE & REHABILITATION

## 2022-07-12 PROCEDURE — 82962 GLUCOSE BLOOD TEST: CPT

## 2022-07-12 RX ORDER — BUPIVACAINE HYDROCHLORIDE 5 MG/ML
10 INJECTION, SOLUTION EPIDURAL; INTRACAUDAL ONCE
Status: COMPLETED | OUTPATIENT
Start: 2022-07-12 | End: 2022-07-12

## 2022-07-12 RX ORDER — LIDOCAINE HYDROCHLORIDE 20 MG/ML
10 INJECTION, SOLUTION INFILTRATION; PERINEURAL ONCE
Status: COMPLETED | OUTPATIENT
Start: 2022-07-12 | End: 2022-07-12

## 2022-07-12 RX ORDER — DEXAMETHASONE SODIUM PHOSPHATE 4 MG/ML
6 INJECTION, SOLUTION INTRA-ARTICULAR; INTRALESIONAL; INTRAMUSCULAR; INTRAVENOUS; SOFT TISSUE ONCE
Status: COMPLETED | OUTPATIENT
Start: 2022-07-12 | End: 2022-07-12

## 2022-07-12 NOTE — PERIOP NOTES
Patient received to PACU, VSS. Patient awake and alert with no complaints of pain. Injection site intact. Neuro:  Push/Pull assessment:     LUE Response: strong   LLE Response: moderate   RUE Response: strong    RLE Response: weak     Discharge instructions given. Patient verbalized understanding of instructions and follow up. Patient states ready for discharge - patient discharged at this time by wheelchair with all belongings. family to provide transportation home.

## 2022-07-12 NOTE — DISCHARGE INSTRUCTIONS
Radiofrequency Ablation  Discharge Instructions    You had a radiofrequency ablation today. You will probably have some numbness in your lower back area for the next 6-8 hours. You should begin feeling better after a few days, but it may take up to 2 weeks to notice the difference. The benefit you get from your injection will last a variable amount of time, depending on the severity of your spine problem. · Pain:  Most people do not have any increase in pain after this injection. However, you might experience some soreness a few days at the site of the injection. If this happens, putting an ice pack over the sore area will help. · Bandage: You have a small bandage covering the site of the injection. You may remove it when you get home. · Restrictions:  Some one should drive you home after the injection. After that, you have no restrictions. You may resume your normal level of activity. You may take a shower or bath, and you may eat normally. You should continue your current exercise and/or therapy routine. Medications:  Continue your current medications as prescribed. If you pain decreases, you may reduce the amount of your pain medications. If you stopped taking anticoagulants or blood-thinners before the injection, start them tomorrow. Call Dr. Pina Stewart at [phone] if you experience:    · Fever (101 degrees Fahrenheit or greater)  · Nausea or vomiting  · Headache unrelieved by your normal pain medication  · Redness or swelling at the injection site that lasts more than 1 day  · New numbness, tingling, weakness, or pain that you didn't have before the injection      If still having pain in 1-2 weeks, call office at [phone] for a follow up appointment.         DISCHARGE SUMMARY from Nurse    The following personal items collected during your admission are returned to you:   Dental Appliance: Dental Appliances: Uppers,Partials  Vision: Visual Aid: Glasses (readers)  Hearing Aid: Jewelry: Jewelry: None  Clothing: Clothing: Hat (In pAtient bag)  Other Valuables: Other Valuables: None  Valuables sent to safe: If you were given prescriptions, please review the written information on prescribed medications. · You will receive a Post Operative Call from one of the Recovery Room Nurses on the day after your surgery to check on you. It is very important for us to know how you are recovering after your surgery. · You may receive an e-mail or letter in the mail from [employer] regarding your experience with us in the Ambulatory Surgery Unit. Your feedback is valuable to us and we appreciate your participation in the survey. If you have not had your influenza or pneumococcal vaccines, please follow up with your primary care physician. The discharge information has been reviewed with the patient. The patient verbalized understanding.

## 2022-07-12 NOTE — H&P
Procedural Case Note    7/12/2022    (8:44 AM)    Dane Rose    1935   (80 y.o.)    [id number]    CC:  pain    ROS:   Complete ROS obtained, no CP, no SOB, no N or V    PMH:     Past Medical History:   Diagnosis Date    Arthritis     osteoarthritis    Basal cell carcinoma (BCC) of skin of nose     Bladder cancer (HCC)     Bleeds easily (HCC)     Chronic pain     back    Diabetes (HCC)     borderline    First degree AV block     Foot drop, right     GERD (gastroesophageal reflux disease)     Hypertension     Insomnia     Lymphedema of left leg     Nephritis     age 8, brights disease    Neuropathy     in both feet    Prostate cancer ([facility] [address]     Selenium deficiency     Staph infection     Both eyes       ALLERGIES:     Allergies   Allergen Reactions    Sulfa (Sulfonamide Antibiotics) Hives       MEDS:     No current facility-administered medications for this encounter. Visit Vitals  /68 (BP 1 Location: Right upper arm, BP Patient Position: At rest)   Pulse 63   Temp 97.7 °F (36.5 °C)   Resp 14   Ht 5' 6\" (1.676 m)   Wt 68 kg (150 lb)   SpO2 97%   BMI 24.21 kg/m²     PE:  Gen: NAD  Head: normocephalic  Heart: RRR  Lungs: CTA arelis  Abd: NT, ND, soft  Neuro: awake and alert  Skin: intact    IMPRESSION:   Lumbar spondylosis    Note:  The clinical status was discussed in detail with the patient. The procedure was again discussed and described in detail. All understand and accept the planned procedure and risks; reject other forms of treatment. All questions are answered.     Cydney Goyal MD

## 2022-07-12 NOTE — OP NOTES
Operative Note    Patient: Ramya Chaudhry  YOB: 1935  MRN: [id number]    Date of Procedure: 7/12/2022       Procedure(s):  LEFT L4-5, L5-S1 RADIO FREQUENCY ABLATION WITH LOCAL    Surgeon(s):  Aimee Durbin MD    Surgical Assistant: Surg Asst-1: Lorel Lennox    Anesthesia: Local     PROCEDURES PERFORMED:   1. Radiofrequency neuroablation of the medial branches R L3, L4 and L5    PREPROCEDURE DIAGNOSIS: lumbar spondylosis with chronic back pain. POST PROCEDURE DIAGNOSIS: lumbar spondylosis with chronic back pain. SURGEON: Dinora Scanlon M.D. HISTORY OF PRESENT ILLNESS AND INDICATIONS FOR THE PROCEDURE: This patient was previously given diagnostic successful blocks of the facet joints innervated by the aforementioned medial branches and is here today for ablation and neurolysis of those branches. He has previously failed conservative management which has proceeded to injection therapy. FINDINGS AND PROCEDURES:  Adequate informed consent was obtained and the patient was taken to the procedure room and placed in the prone position on the procedure table. A time out was held for patient verification. The patient had monitoring throughout the procedure at cycled one minute blood pressure, pulse, and pulse oximetry. The skin was prepped and draped in the normal sterile fashion. Using fluoroscopic guidance in anteroposterior, oblique, and lateral views, the appropriate superior articular processes and pedicles were identified. After identification 1 percent lidocaine skin anesthesia was administered at each site. Using a radiofrequency ablation needle, a 22 gauge 10 millimeter active tip was placed at the medial branch nerves, Left L3, L4 and L5. The initial impedance in Ohms was within acceptable limits. Each level was subsequently tested on sensory at 50 megahertz with no referred pattern of pain noted at any level.  Each level was also tested at 2 Hertz with no distal motor movement or referred pain pattern. The lesion site was injected with 0.5 milliliters of 0.5 percent bupivacaine anesthesia of the nerve branch. Next a lesion was applied for 90 seconds at 80 degrees. Permanent fluoroscopic images were saved in the patient's record. The patient tolerated the procedure well with no apparent complications. The patient was taken to the recovery area for further monitoring. Post procedure neurologic examination was consistent with preprocedure examination. The patient was discharged home ambulatory with family and was given post procedure instructions. FOLLOW-UP: The patient will have a follow-up appointment scheduled by the clinic in the coming weeks.              Electronically Signed by Shanna Lane MD on 7/12/2022 at 9:52 AM

## 2022-07-12 NOTE — PERIOP NOTES
Corey Wisdom  1935  [id number]    Situation:  Verbal report given from: RIDDHI Blum RN  Procedure: Procedure(s):  LEFT L4-5, L5-S1 RADIO FREQUENCY ABLATION WITH LOCAL    Background:    Preoperative diagnosis: Lumbar disc disease with radiculopathy [M51.16]  Osteoarthritis of lumbar spine, unspecified spinal osteoarthritis complication status [L07.486]  Acquired spondylolisthesis [M43.10]  Spinal stenosis, lumbar region, with neurogenic claudication [M48.062]    Postoperative diagnosis: Lumbar disc disease with radiculopathy [M51.16]  Osteoarthritis of lumbar spine, unspecified spinal osteoarthritis complication status [E12.772]  Acquired spondylolisthesis [M43.10]  Spinal stenosis, lumbar region, with neurogenic claudication [M48.062]    :  Dr. Borges Hands): Circ-1: Marta Huerta  Local Nurse Monitor: Marce Chadwick RN  Surg Asst-1: Osmel Mandel  Radiology Technologist: Flower Villarreal    Specimens: * No specimens in log *    Assessment:  Intra-procedure medications         Anesthesia gave intra-procedure sedation and medications, see anesthesia flow sheet     Intravenous fluids: LR@ KVO     Vital signs stable       Recommendation:    Permission to share finding with friend : yes

## 2022-07-12 NOTE — PERIOP NOTES
Neuro:  Push/Pull assessment:     LUE Response: strong    LLE Response: mod   RUE Response: strong   RLE Response: weak with drop foot

## 2022-07-21 ENCOUNTER — TRANSCRIBE ORDER (OUTPATIENT)
Dept: SCHEDULING | Age: 87
End: 2022-07-21

## 2022-07-21 DIAGNOSIS — M47.816 OSTEOARTHRITIS OF LUMBAR SPINE, UNSPECIFIED SPINAL OSTEOARTHRITIS COMPLICATION STATUS: Primary | ICD-10-CM

## 2022-07-27 ENCOUNTER — HOSPITAL ENCOUNTER (OUTPATIENT)
Dept: MRI IMAGING | Age: 87
Discharge: HOME | End: 2022-07-27
Attending: NURSE PRACTITIONER
Payer: MEDICARE

## 2022-07-27 DIAGNOSIS — M47.816 OSTEOARTHRITIS OF LUMBAR SPINE, UNSPECIFIED SPINAL OSTEOARTHRITIS COMPLICATION STATUS: ICD-10-CM

## 2022-07-27 PROCEDURE — 72195 MRI PELVIS W/O DYE: CPT

## 2022-09-27 ENCOUNTER — TELEPHONE (OUTPATIENT)
Dept: NEUROLOGY | Age: 87
End: 2022-09-27

## 2022-09-27 NOTE — TELEPHONE ENCOUNTER
Received fax from [facility]. Verified patient with two patient identifiers. Refused refill request per Dr. Gale Hernandez. Last seen 8-, > 2 years ago. Call PCP. Must be seen prior to refills. Faxed form back to [facility] to notify pt. Confirmation received.

## 2023-03-14 ENCOUNTER — TRANSCRIBE ORDER (OUTPATIENT)
Dept: SCHEDULING | Age: 88
End: 2023-03-14

## 2023-03-14 DIAGNOSIS — M48.062 SPINAL STENOSIS, LUMBAR REGION, WITH NEUROGENIC CLAUDICATION: ICD-10-CM

## 2023-03-14 DIAGNOSIS — M47.896 OTHER OSTEOARTHRITIS OF SPINE, LUMBAR REGION: ICD-10-CM

## 2023-03-14 DIAGNOSIS — M51.36 DDD (DEGENERATIVE DISC DISEASE), LUMBAR: Primary | ICD-10-CM

## 2023-03-14 DIAGNOSIS — M51.16 LUMBAR DISC DISEASE WITH RADICULOPATHY: ICD-10-CM

## 2023-04-03 ENCOUNTER — HOSPITAL ENCOUNTER (OUTPATIENT)
Dept: MRI IMAGING | Age: 88
End: 2023-04-03
Attending: NURSE PRACTITIONER
Payer: MEDICARE

## 2023-04-03 DIAGNOSIS — M51.16 LUMBAR DISC DISEASE WITH RADICULOPATHY: ICD-10-CM

## 2023-04-03 DIAGNOSIS — M48.062 SPINAL STENOSIS, LUMBAR REGION, WITH NEUROGENIC CLAUDICATION: ICD-10-CM

## 2023-04-03 DIAGNOSIS — M47.896 OTHER OSTEOARTHRITIS OF SPINE, LUMBAR REGION: ICD-10-CM

## 2023-04-03 DIAGNOSIS — M51.36 DDD (DEGENERATIVE DISC DISEASE), LUMBAR: ICD-10-CM

## 2023-04-03 PROCEDURE — 72148 MRI LUMBAR SPINE W/O DYE: CPT

## 2023-08-01 ENCOUNTER — HOSPITAL ENCOUNTER (OUTPATIENT)
Facility: HOSPITAL | Age: 88
Discharge: HOME | End: 2023-08-04
Attending: UROLOGY
Payer: MEDICARE

## 2023-08-01 DIAGNOSIS — C67.9 MALIGNANT NEOPLASM OF URINARY BLADDER, UNSPECIFIED SITE (HCC): ICD-10-CM

## 2023-08-01 LAB — CREAT BLD-MCNC: 1.1 MG/DL (ref 0.6–1.3)

## 2023-08-01 PROCEDURE — 82565 ASSAY OF CREATININE: CPT

## 2023-08-01 PROCEDURE — 6360000004 HC RX CONTRAST MEDICATION: Performed by: UROLOGY

## 2023-08-01 PROCEDURE — 74178 CT ABD&PLV WO CNTR FLWD CNTR: CPT

## 2023-08-01 RX ADMIN — IOPAMIDOL 100 ML: 755 INJECTION, SOLUTION INTRAVENOUS at 15:22

## 2023-08-03 ENCOUNTER — TELEPHONE (OUTPATIENT)
Age: 88
End: 2023-08-03

## 2023-08-03 NOTE — TELEPHONE ENCOUNTER
Patient called stating that he recently fell (4 days ago) and busted head and had to get stitches in ER. Pt stated that his ankles are very weak and has been falling quite a bit. Pt wants to speak with Dr Luis E Velez to discuss next steps and when he can be seen again. As of 8/27/2023, he will be considered a new Pt as the last time he was seen was 8/27/2020. Please advise.  [phone]

## 2023-08-07 ENCOUNTER — OFFICE VISIT (OUTPATIENT)
Age: 88
End: 2023-08-07
Payer: MEDICARE

## 2023-08-07 VITALS
TEMPERATURE: 98 F | SYSTOLIC BLOOD PRESSURE: 124 MMHG | WEIGHT: 149 LBS | HEIGHT: 66 IN | RESPIRATION RATE: 16 BRPM | DIASTOLIC BLOOD PRESSURE: 60 MMHG | OXYGEN SATURATION: 95 % | BODY MASS INDEX: 23.95 KG/M2 | HEART RATE: 71 BPM

## 2023-08-07 DIAGNOSIS — E11.42 DIABETIC PERIPHERAL NEUROPATHY ASSOCIATED WITH TYPE 2 DIABETES MELLITUS (HCC): ICD-10-CM

## 2023-08-07 DIAGNOSIS — M48.061 DEGENERATIVE LUMBAR SPINAL STENOSIS: Primary | ICD-10-CM

## 2023-08-07 DIAGNOSIS — I65.23 BILATERAL CAROTID ARTERY STENOSIS: ICD-10-CM

## 2023-08-07 DIAGNOSIS — M96.1 LUMBAR POST-LAMINECTOMY SYNDROME: ICD-10-CM

## 2023-08-07 DIAGNOSIS — M96.1 CERVICAL POST-LAMINECTOMY SYNDROME: ICD-10-CM

## 2023-08-07 DIAGNOSIS — I63.231 CEREBRAL INFARCTION INVOLVING RIGHT CAROTID ARTERY (HCC): ICD-10-CM

## 2023-08-07 DIAGNOSIS — M54.16 LUMBAR BACK PAIN WITH RADICULOPATHY AFFECTING RIGHT LOWER EXTREMITY: ICD-10-CM

## 2023-08-07 DIAGNOSIS — I67.89 CEREBRAL MICROVASCULAR DISEASE: ICD-10-CM

## 2023-08-07 PROCEDURE — 99214 OFFICE O/P EST MOD 30 MIN: CPT | Performed by: PSYCHIATRY & NEUROLOGY

## 2023-08-07 PROCEDURE — G8420 CALC BMI NORM PARAMETERS: HCPCS | Performed by: PSYCHIATRY & NEUROLOGY

## 2023-08-07 PROCEDURE — G8427 DOCREV CUR MEDS BY ELIG CLIN: HCPCS | Performed by: PSYCHIATRY & NEUROLOGY

## 2023-08-07 PROCEDURE — 1036F TOBACCO NON-USER: CPT | Performed by: PSYCHIATRY & NEUROLOGY

## 2023-08-07 PROCEDURE — 1123F ACP DISCUSS/DSCN MKR DOCD: CPT | Performed by: PSYCHIATRY & NEUROLOGY

## 2023-08-07 ASSESSMENT — PATIENT HEALTH QUESTIONNAIRE - PHQ9
1. LITTLE INTEREST OR PLEASURE IN DOING THINGS: 0
SUM OF ALL RESPONSES TO PHQ9 QUESTIONS 1 & 2: 0
SUM OF ALL RESPONSES TO PHQ QUESTIONS 1-9: 0
2. FEELING DOWN, DEPRESSED OR HOPELESS: 0
SUM OF ALL RESPONSES TO PHQ QUESTIONS 1-9: 0

## 2023-08-07 NOTE — PROGRESS NOTES
moderate to severe foot drop on the right side from his previous back problems, and foot drop on the right. Normal bulk and tone. No fasciculations. Patient has very stiff lower lumbar spine with muscle spasms present and decreased range of motion and of the neck also  Rapid altering movement is a little slow but symmetric   Reflexes:   Deep tendon reflexes 1+/4 and symmetrical, absent ankle jerks bilaterally. No babinski or clonus present   Sensory:   Abnormal to touch, pinprick and vibration and temperature in both feet to midcalf level. DSS is intact   Gait:  Abnormal gait because of his back pain he moves slowly, and has a steppage type gait because of his foot drop on the right side and a marked limp. Tremor:   No tremor noted. Cerebellar:  Mildly abnormal Romberg and tandem cerebellar signs present, but finger-nose-finger examination shows no major tremor. .   Neurovascular:  Normal heart sounds and regular rhythm, peripheral pulses decreased, and no carotid bruits         Assessment:      Diagnosis Orders   1. Degenerative lumbar spinal stenosis, severe at L4-5 and L3-4  [address] - Physical Therapy at [facility], [address] [place] [address]      2. Lumbar back pain with radiculopathy affecting right lower extremity  [address] - Physical Therapy at [facility], [address] [place] [address]      3. Cerebral microvascular disease  [address] - Physical Therapy at [facility], [address] [place] [address]    Vascular duplex carotid bilateral      4. Bilateral carotid artery stenosis  [address] - Physical Therapy at [facility], [address] [place] [address]    Vascular duplex carotid bilateral      5. Diabetic peripheral neuropathy associated with type 2 diabetes mellitus ([address])  [address] - Physical Therapy at [facility], [address] [place] [address]      6. Cerebral infarction involving right carotid artery ([address])  [address] - Physical Therapy at [facility], [address] [place] [address]    Vascular duplex carotid bilateral      7. Cervical post-laminectomy syndrome  [address] - Physical Therapy at [facility], [address] [place] [address]      8.  Lumbar post-laminectomy syndrome

## 2023-08-08 ENCOUNTER — PROCEDURE VISIT (OUTPATIENT)
Age: 88
End: 2023-08-08
Payer: MEDICARE

## 2023-08-08 DIAGNOSIS — I65.23 BILATERAL CAROTID ARTERY STENOSIS: ICD-10-CM

## 2023-08-08 DIAGNOSIS — I63.231 CEREBRAL INFARCTION INVOLVING RIGHT CAROTID ARTERY (HCC): ICD-10-CM

## 2023-08-08 DIAGNOSIS — I67.89 CEREBRAL MICROVASCULAR DISEASE: ICD-10-CM

## 2023-08-08 PROCEDURE — 93880 EXTRACRANIAL BILAT STUDY: CPT | Performed by: PSYCHIATRY & NEUROLOGY

## 2023-10-05 ENCOUNTER — HOSPITAL ENCOUNTER (OUTPATIENT)
Facility: HOSPITAL | Age: 88
Discharge: HOME | End: 2023-10-05
Payer: MEDICARE

## 2023-10-05 ENCOUNTER — TRANSCRIBE ORDERS (OUTPATIENT)
Facility: HOSPITAL | Age: 88
End: 2023-10-05

## 2023-10-05 DIAGNOSIS — R19.7 DIARRHEA, UNSPECIFIED TYPE: ICD-10-CM

## 2023-10-05 DIAGNOSIS — K59.00 CONSTIPATION, UNSPECIFIED CONSTIPATION TYPE: Primary | ICD-10-CM

## 2023-10-05 DIAGNOSIS — K59.00 CONSTIPATION, UNSPECIFIED CONSTIPATION TYPE: ICD-10-CM

## 2023-10-05 PROCEDURE — 74018 RADEX ABDOMEN 1 VIEW: CPT

## 2024-07-19 ENCOUNTER — TRANSCRIBE ORDERS (OUTPATIENT)
Facility: HOSPITAL | Age: 89
End: 2024-07-19

## 2024-07-19 ENCOUNTER — HOSPITAL ENCOUNTER (OUTPATIENT)
Facility: HOSPITAL | Age: 89
End: 2024-07-19
Payer: MEDICARE

## 2024-07-19 DIAGNOSIS — R19.4 CHANGE IN BOWEL HABITS: Primary | ICD-10-CM

## 2024-07-19 DIAGNOSIS — R19.4 CHANGE IN BOWEL HABITS: ICD-10-CM

## 2024-07-19 PROCEDURE — 74018 RADEX ABDOMEN 1 VIEW: CPT

## 2024-07-22 ENCOUNTER — APPOINTMENT (OUTPATIENT)
Facility: HOSPITAL | Age: 89
End: 2024-07-22
Payer: MEDICARE

## 2024-07-22 ENCOUNTER — HOSPITAL ENCOUNTER (EMERGENCY)
Facility: HOSPITAL | Age: 89
Discharge: HOME | End: 2024-07-22
Attending: STUDENT IN AN ORGANIZED HEALTH CARE EDUCATION/TRAINING PROGRAM
Payer: MEDICARE

## 2024-07-22 VITALS
BODY MASS INDEX: 25.73 KG/M2 | TEMPERATURE: 97.2 F | HEART RATE: 51 BPM | WEIGHT: 159.39 LBS | OXYGEN SATURATION: 96 % | DIASTOLIC BLOOD PRESSURE: 89 MMHG | SYSTOLIC BLOOD PRESSURE: 150 MMHG | RESPIRATION RATE: 16 BRPM

## 2024-07-22 DIAGNOSIS — R07.89 ATYPICAL CHEST PAIN: Primary | ICD-10-CM

## 2024-07-22 LAB
ALBUMIN SERPL-MCNC: 3.7 G/DL (ref 3.5–5)
ALBUMIN/GLOB SERPL: 1.2 (ref 1.1–2.2)
ALP SERPL-CCNC: 68 U/L (ref 45–117)
ALT SERPL-CCNC: 19 U/L (ref 12–78)
ANION GAP SERPL CALC-SCNC: 3 MMOL/L (ref 5–15)
AST SERPL-CCNC: 14 U/L (ref 15–37)
BASOPHILS # BLD: 0 K/UL (ref 0–0.1)
BASOPHILS NFR BLD: 1 % (ref 0–1)
BILIRUB SERPL-MCNC: 0.5 MG/DL (ref 0.2–1)
BUN SERPL-MCNC: 21 MG/DL (ref 6–20)
BUN/CREAT SERPL: 16 (ref 12–20)
CALCIUM SERPL-MCNC: 9 MG/DL (ref 8.5–10.1)
CHLORIDE SERPL-SCNC: 103 MMOL/L (ref 97–108)
CO2 SERPL-SCNC: 29 MMOL/L (ref 21–32)
CREAT SERPL-MCNC: 1.29 MG/DL (ref 0.7–1.3)
D DIMER PPP FEU-MCNC: 0.42 MG/L FEU (ref 0–0.65)
DIFFERENTIAL METHOD BLD: ABNORMAL
EOSINOPHIL # BLD: 0.2 K/UL (ref 0–0.4)
EOSINOPHIL NFR BLD: 3 % (ref 0–7)
ERYTHROCYTE [DISTWIDTH] IN BLOOD BY AUTOMATED COUNT: 12.1 % (ref 11.5–14.5)
GLOBULIN SER CALC-MCNC: 3.2 G/DL (ref 2–4)
GLUCOSE SERPL-MCNC: 140 MG/DL (ref 65–100)
HCT VFR BLD AUTO: 37.4 % (ref 36.6–50.3)
HGB BLD-MCNC: 12.3 G/DL (ref 12.1–17)
IMM GRANULOCYTES # BLD AUTO: 0 K/UL (ref 0–0.04)
IMM GRANULOCYTES NFR BLD AUTO: 0 % (ref 0–0.5)
LYMPHOCYTES # BLD: 2 K/UL (ref 0.8–3.5)
LYMPHOCYTES NFR BLD: 26 % (ref 12–49)
MCH RBC QN AUTO: 31.5 PG (ref 26–34)
MCHC RBC AUTO-ENTMCNC: 32.9 G/DL (ref 30–36.5)
MCV RBC AUTO: 95.9 FL (ref 80–99)
MONOCYTES # BLD: 0.6 K/UL (ref 0–1)
MONOCYTES NFR BLD: 8 % (ref 5–13)
NEUTS SEG # BLD: 4.6 K/UL (ref 1.8–8)
NEUTS SEG NFR BLD: 62 % (ref 32–75)
NRBC # BLD: 0 K/UL (ref 0–0.01)
NRBC BLD-RTO: 0 PER 100 WBC
PLATELET # BLD AUTO: 241 K/UL (ref 150–400)
PMV BLD AUTO: 9.4 FL (ref 8.9–12.9)
POTASSIUM SERPL-SCNC: 4.1 MMOL/L (ref 3.5–5.1)
PROT SERPL-MCNC: 6.9 G/DL (ref 6.4–8.2)
RBC # BLD AUTO: 3.9 M/UL (ref 4.1–5.7)
SODIUM SERPL-SCNC: 135 MMOL/L (ref 136–145)
TROPONIN I SERPL HS-MCNC: 7 NG/L (ref 0–76)
TROPONIN I SERPL HS-MCNC: 9 NG/L (ref 0–76)
WBC # BLD AUTO: 7.5 K/UL (ref 4.1–11.1)

## 2024-07-22 PROCEDURE — 80053 COMPREHEN METABOLIC PANEL: CPT

## 2024-07-22 PROCEDURE — 71045 X-RAY EXAM CHEST 1 VIEW: CPT

## 2024-07-22 PROCEDURE — 99285 EMERGENCY DEPT VISIT HI MDM: CPT

## 2024-07-22 PROCEDURE — 85025 COMPLETE CBC W/AUTO DIFF WBC: CPT

## 2024-07-22 PROCEDURE — 85379 FIBRIN DEGRADATION QUANT: CPT

## 2024-07-22 PROCEDURE — 84484 ASSAY OF TROPONIN QUANT: CPT

## 2024-07-22 PROCEDURE — 6370000000 HC RX 637 (ALT 250 FOR IP): Performed by: STUDENT IN AN ORGANIZED HEALTH CARE EDUCATION/TRAINING PROGRAM

## 2024-07-22 PROCEDURE — 93005 ELECTROCARDIOGRAM TRACING: CPT | Performed by: STUDENT IN AN ORGANIZED HEALTH CARE EDUCATION/TRAINING PROGRAM

## 2024-07-22 PROCEDURE — 36415 COLL VENOUS BLD VENIPUNCTURE: CPT

## 2024-07-22 RX ORDER — ACETAMINOPHEN 500 MG
1000 TABLET ORAL
Status: COMPLETED | OUTPATIENT
Start: 2024-07-22 | End: 2024-07-22

## 2024-07-22 RX ADMIN — ACETAMINOPHEN 1000 MG: 500 TABLET ORAL at 17:54

## 2024-07-22 NOTE — ED NOTES
Verbal shift change report given to Leona (oncoming nurse) by Pantera (offgoing nurse). Report included the following information Nurse Handoff Report, ED SBAR, and MAR.

## 2024-07-22 NOTE — DISCHARGE INSTRUCTIONS
If you experience any new or concerning symptoms or your symptoms change or worsen, return to the ER as soon as possible.     Thank You!    It was a pleasure taking care of you in our Emergency Department today. We know that when you come to our Emergency Department, you are entrusting us with your health, comfort, and safety. Our physicians and nurses honor that trust, and truly appreciate the opportunity to care for you and your loved ones.      We also value your feedback. If you receive a survey about your Emergency Department experience today, please fill it out.  We care about our patients' feedback, and we listen to what you have to say.  Thank you.    Servando Reddy, DO  ________________________________________________________________________  I have included a copy of your lab results and/or radiologic studies from today's visit so you can have them easily available at your follow-up visit. We hope you feel better and please do not hesitate to contact the ED if you have any questions at all!    Recent Results (from the past 12 hour(s))   EKG 12 Lead    Collection Time: 07/22/24  3:53 PM   Result Value Ref Range    Ventricular Rate 62 BPM    Atrial Rate 62 BPM    P-R Interval 234 ms    QRS Duration 106 ms    Q-T Interval 382 ms    QTc Calculation (Bazett) 387 ms    P Axis 31 degrees    R Axis 48 degrees    T Axis 30 degrees    Diagnosis       Sinus rhythm with 1st degree AV block  When compared with ECG of 08-DEC-2021 08:42,  Criteria for Septal infarct are no longer present     CBC with Auto Differential    Collection Time: 07/22/24  4:33 PM   Result Value Ref Range    WBC 7.5 4.1 - 11.1 K/uL    RBC 3.90 (L) 4.10 - 5.70 M/uL    Hemoglobin 12.3 12.1 - 17.0 g/dL    Hematocrit 37.4 36.6 - 50.3 %    MCV 95.9 80.0 - 99.0 FL    MCH 31.5 26.0 - 34.0 PG    MCHC 32.9 30.0 - 36.5 g/dL    RDW 12.1 11.5 - 14.5 %    Platelets 241 150 - 400 K/uL    MPV 9.4 8.9 - 12.9 FL    Nucleated RBCs 0.0 0  WBC    nRBC 0.00

## 2024-07-23 LAB
EKG ATRIAL RATE: 62 BPM
EKG DIAGNOSIS: NORMAL
EKG P AXIS: 31 DEGREES
EKG P-R INTERVAL: 234 MS
EKG Q-T INTERVAL: 382 MS
EKG QRS DURATION: 106 MS
EKG QTC CALCULATION (BAZETT): 387 MS
EKG R AXIS: 48 DEGREES
EKG T AXIS: 30 DEGREES
EKG VENTRICULAR RATE: 62 BPM

## 2024-07-23 NOTE — ED PROVIDER NOTES
[facility] EMERGENCY DEPT  EMERGENCY DEPARTMENT ENCOUNTER       Pt Name: Arvin Godfrey  MRN: [id number]  Birthdate 1935  Date of evaluation: 7/22/2024  Provider: Servando Reddy DO   PCP: Karen Olmedo MD  Note Started: 11:01 PM 7/22/24     CHIEF COMPLAINT       Chief Complaint   Patient presents with    Chest Pain     Patient ambulatory into triage complaining of chest pain x3 weeks. Patient reports it feels like a pulled muscle. Patient denies shortness of breath. Patient denies cardiac hx.         HISTORY OF PRESENT ILLNESS: 1 or more elements      History From: Patient  None     Arvin Godfrey is a 88 y.o. male who presents with cc of left sided chest pain for the past 3 weeks. Reports it feels like he pulled a muscle. No dyspnea on exertion. No pleuritif chest pain. No hx of Dvt or PE. No cardiac history. Denies any hx of HTN or high cholesterol. Denies any leg swelling. Worse with movement and with moving his arms and curling over on his side. Does not do any heavy lifting. Feels like a soreness.      Nursing Notes were all reviewed and agreed with or any disagreements were addressed in the HPI.       PAST HISTORY     Past Medical History:  Past Medical History:   Diagnosis Date    Arthritis     osteoarthritis    Basal cell carcinoma (BCC) of skin of nose     Bladder cancer (HCC)     Bleeds easily (HCC)     Chronic pain     back    Diabetes (HCC)     borderline    First degree AV block     Foot drop, right     GERD (gastroesophageal reflux disease)     Hypertension     Insomnia     Lymphedema of left leg     Nephritis     age 10, brights disease    Neuropathy     in both feet    Prostate cancer (HCC)     Selenium deficiency     Staph infection     Both eyes         Past Surgical History:  Past Surgical History:   Procedure Laterality Date    CARPAL TUNNEL RELEASE Right 2015    CARPAL TUNNEL RELEASE Left 2012    CATARACT REMOVAL Left 2019    CATARACT REMOVAL Right 2009    HERNIA REPAIR  11/19/2020    Recurrent

## 2024-09-23 NOTE — PERIOP NOTES
Milly Dorian  1935  [id number]    Situation:  Verbal report given from: RIDDHI Plascencia RN  Procedure: Procedure(s):  BILATERAL L4 TRANSFORAMINAL EPIDURAL STEROID INJECTION    Background:    Preoperative diagnosis: Degeneration of lumbar intervertebral disc [M51.36]    Postoperative diagnosis: Degeneration of lumbar intervertebral disc [M51.36]    :  Dr. Sarika Mendiola): Reyan Macias: Amber Cortez  Local Nurse Monitor: Floresita Redmond RN  Scrub Tech-1: Kerri Medellin    Specimens: * No specimens in log *    Assessment:  Intra-procedure medications         Anesthesia gave intra-procedure sedation and medications, see anesthesia flow sheet     Intravenous fluids: LR@ KVO     Vital signs stable       Recommendation:    Permission to share finding with Holly Flores : yes no

## 2024-10-04 ENCOUNTER — TRANSCRIBE ORDERS (OUTPATIENT)
Facility: HOSPITAL | Age: 89
End: 2024-10-04

## 2024-10-04 ENCOUNTER — HOSPITAL ENCOUNTER (OUTPATIENT)
Facility: HOSPITAL | Age: 89
Discharge: HOME | End: 2024-10-07
Payer: MEDICARE

## 2024-10-04 DIAGNOSIS — J69.0 ASPIRATION PNEUMONIA OF RIGHT LOWER LOBE, UNSPECIFIED ASPIRATION PNEUMONIA TYPE (HCC): ICD-10-CM

## 2024-10-04 DIAGNOSIS — J69.0 ASPIRATION PNEUMONIA OF RIGHT LOWER LOBE, UNSPECIFIED ASPIRATION PNEUMONIA TYPE (HCC): Primary | ICD-10-CM

## 2024-10-04 PROCEDURE — 71046 X-RAY EXAM CHEST 2 VIEWS: CPT

## 2024-10-24 LAB
ALBUMIN SERPL-MCNC: 3.7 G/DL (ref 3.5–5)
ALBUMIN/GLOB SERPL: 1.4 (ref 1.1–2.2)
ALP SERPL-CCNC: 58 U/L (ref 45–117)
ALT SERPL-CCNC: 16 U/L (ref 12–78)
AMYLASE SERPL-CCNC: 90 U/L (ref 25–115)
ANION GAP SERPL CALC-SCNC: 6 MMOL/L (ref 2–12)
AST SERPL-CCNC: 10 U/L (ref 15–37)
BASOPHILS # BLD: 0 K/UL (ref 0–0.1)
BASOPHILS NFR BLD: 0 % (ref 0–1)
BILIRUB SERPL-MCNC: 0.4 MG/DL (ref 0.2–1)
BUN SERPL-MCNC: 24 MG/DL (ref 6–20)
BUN/CREAT SERPL: 18 (ref 12–20)
CALCIUM SERPL-MCNC: 9.1 MG/DL (ref 8.5–10.1)
CHLORIDE SERPL-SCNC: 102 MMOL/L (ref 97–108)
CHOLEST SERPL-MCNC: 177 MG/DL
CO2 SERPL-SCNC: 28 MMOL/L (ref 21–32)
CREAT SERPL-MCNC: 1.36 MG/DL (ref 0.7–1.3)
DIFFERENTIAL METHOD BLD: ABNORMAL
EOSINOPHIL # BLD: 0.2 K/UL (ref 0–0.4)
EOSINOPHIL NFR BLD: 3 % (ref 0–7)
ERYTHROCYTE [DISTWIDTH] IN BLOOD BY AUTOMATED COUNT: 12.3 % (ref 11.5–14.5)
EST. AVERAGE GLUCOSE BLD GHB EST-MCNC: 134 MG/DL
GLOBULIN SER CALC-MCNC: 2.6 G/DL (ref 2–4)
GLUCOSE SERPL-MCNC: 108 MG/DL (ref 65–100)
HBA1C MFR BLD: 6.3 % (ref 4–5.6)
HCT VFR BLD AUTO: 35.2 % (ref 36.6–50.3)
HDLC SERPL-MCNC: 50 MG/DL
HDLC SERPL: 3.5 (ref 0–5)
HGB BLD-MCNC: 11.6 G/DL (ref 12.1–17)
IMM GRANULOCYTES # BLD AUTO: 0 K/UL (ref 0–0.04)
IMM GRANULOCYTES NFR BLD AUTO: 0 % (ref 0–0.5)
LDLC SERPL CALC-MCNC: 100.2 MG/DL (ref 0–100)
LIPASE SERPL-CCNC: 77 U/L (ref 13–75)
LYMPHOCYTES # BLD: 2.2 K/UL (ref 0.8–3.5)
LYMPHOCYTES NFR BLD: 28 % (ref 12–49)
MCH RBC QN AUTO: 32.2 PG (ref 26–34)
MCHC RBC AUTO-ENTMCNC: 33 G/DL (ref 30–36.5)
MCV RBC AUTO: 97.8 FL (ref 80–99)
MONOCYTES # BLD: 0.7 K/UL (ref 0–1)
MONOCYTES NFR BLD: 8 % (ref 5–13)
NEUTS SEG # BLD: 4.7 K/UL (ref 1.8–8)
NEUTS SEG NFR BLD: 60 % (ref 32–75)
NRBC # BLD: 0 K/UL (ref 0–0.01)
NRBC BLD-RTO: 0 PER 100 WBC
PLATELET # BLD AUTO: 271 K/UL (ref 150–400)
PMV BLD AUTO: 11.3 FL (ref 8.9–12.9)
POTASSIUM SERPL-SCNC: 4.8 MMOL/L (ref 3.5–5.1)
PROT SERPL-MCNC: 6.3 G/DL (ref 6.4–8.2)
RBC # BLD AUTO: 3.6 M/UL (ref 4.1–5.7)
SODIUM SERPL-SCNC: 136 MMOL/L (ref 136–145)
TRIGL SERPL-MCNC: 134 MG/DL
VLDLC SERPL CALC-MCNC: 26.8 MG/DL
WBC # BLD AUTO: 7.8 K/UL (ref 4.1–11.1)

## 2024-11-03 ENCOUNTER — HOSPITAL ENCOUNTER (OUTPATIENT)
Facility: HOSPITAL | Age: 89
Discharge: HOME | End: 2024-11-06
Payer: MEDICARE

## 2024-11-03 DIAGNOSIS — S76.312A HAMSTRING STRAIN, LEFT, INITIAL ENCOUNTER: ICD-10-CM

## 2024-11-03 PROCEDURE — 73721 MRI JNT OF LWR EXTRE W/O DYE: CPT

## 2024-12-12 ENCOUNTER — TELEPHONE (OUTPATIENT)
Age: 88
End: 2024-12-12

## 2024-12-13 ENCOUNTER — TELEPHONE (OUTPATIENT)
Age: 88
End: 2024-12-13

## 2024-12-13 NOTE — TELEPHONE ENCOUNTER
Patient has EMG schedule for 1/15/24 at [facility] but is requesting sooner appt at [facility] if possible.    Please contact

## 2024-12-20 ENCOUNTER — PROCEDURE VISIT (OUTPATIENT)
Age: 88
End: 2024-12-20
Payer: MEDICARE

## 2024-12-20 DIAGNOSIS — G62.9 POLYNEUROPATHY: Primary | ICD-10-CM

## 2024-12-20 PROCEDURE — 95886 MUSC TEST DONE W/N TEST COMP: CPT | Performed by: PSYCHIATRY & NEUROLOGY

## 2024-12-20 PROCEDURE — 95910 NRV CNDJ TEST 7-8 STUDIES: CPT | Performed by: PSYCHIATRY & NEUROLOGY

## 2024-12-20 NOTE — PROGRESS NOTES
EMG completed.     
examination of the right lower extremity and additional nerve conduction studies of the left lower extremity reveals the followin. 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      2. Cannot exclude a superimposed right L5-S1 motor radiculopathy.     ___________________________  Kellie Childers, DO        Nerve Conduction Studies  Anti Sensory Summary Table     Stim Site NR Peak (ms) Norm Peak (ms) P-T Amp (µV) Norm P-T Amp Site1 Site2 Delta-P (ms) Dist (cm) Garrett (m/s) Norm Garrett (m/s)   Left Sup Fibular Anti Sensory (Ant Lat Mall)  30 °C   14 cm *NR  <4.4  >5.0 14 cm Ant Lat Mall  14.0  >32   Right Sup Fibular Anti Sensory (Ant Lat Mall)  30.8 °C   14 cm *NR  <4.4  >5.0 14 cm Ant Lat Mall  14.0  >32   Left Sural Anti Sensory (Lat Mall)  30 °C   Calf *NR  <4.0  >5.0 Calf Lat Mall  14.0  >35   Right Sural Anti Sensory (Lat Mall)  30.8 °C   Calf *NR  <4.0  >5.0 Calf Lat Mall  14.0  >35     Motor Summary Table     Stim Site NR Onset (ms) Norm Onset (ms) O-P Amp (mV) Norm O-P Amp Site1 Site2 Delta-0 (ms) Dist (cm) Garrett (m/s) Norm Garrett (m/s)   Left Fibular Motor (Ext Dig Brev)  30 °C   Ankle *NR  <6.1  >2.5 B Fib Ankle  35.0  >38   B Fib *NR     Poplt B Fib  10.0  >40   Poplt *NR             Right Fibular Motor (Ext Dig Brev)  30.8 °C   Ankle *NR  <6.1  >2.5 B Fib Ankle  35.0  >38   B Fib *NR     Poplt B Fib  10.0  >40   Poplt *NR             Left Tibial Motor (Abd Crowder Brev)  30.2 °C   Ankle    *6.8 <6.1 *0.3 >3.0 Knee Ankle 10.9 39.0 36 >35   Knee    17.7  0.2          Right Tibial Motor (Abd Crowder Brev)  30.8 °C   Ankle *NR  <6.1  >3.0 Knee Ankle  39.0  >35   Knee *NR               F Wave Studies     NR F-Lat (ms) Lat Norm (ms) L-R F-Lat (ms) L-R Lat Norm   Left Tibial (Abd Hallucis)  30.2 °C      *70.33 <61 *10.19 <5.7   Right Tibial (Abd Hallucis)  30.8 °C      60.14 <61 *10.19 <5.7

## 2025-02-18 ENCOUNTER — OFFICE VISIT (OUTPATIENT)
Age: 89
End: 2025-02-18
Payer: MEDICARE

## 2025-02-18 VITALS
WEIGHT: 155 LBS | SYSTOLIC BLOOD PRESSURE: 120 MMHG | TEMPERATURE: 97.8 F | OXYGEN SATURATION: 97 % | HEIGHT: 66 IN | HEART RATE: 60 BPM | DIASTOLIC BLOOD PRESSURE: 62 MMHG | BODY MASS INDEX: 24.91 KG/M2 | RESPIRATION RATE: 17 BRPM

## 2025-02-18 DIAGNOSIS — R20.0 NUMBNESS AND TINGLING OF LEFT SIDE OF FACE: ICD-10-CM

## 2025-02-18 DIAGNOSIS — M54.16 LUMBAR BACK PAIN WITH RADICULOPATHY AFFECTING LEFT LOWER EXTREMITY: ICD-10-CM

## 2025-02-18 DIAGNOSIS — E11.42 DIABETIC PERIPHERAL NEUROPATHY ASSOCIATED WITH TYPE 2 DIABETES MELLITUS (HCC): ICD-10-CM

## 2025-02-18 DIAGNOSIS — R20.2 NUMBNESS AND TINGLING OF LEFT SIDE OF FACE: ICD-10-CM

## 2025-02-18 DIAGNOSIS — I67.89 CEREBRAL MICROVASCULAR DISEASE: ICD-10-CM

## 2025-02-18 DIAGNOSIS — M96.1 LUMBAR POST-LAMINECTOMY SYNDROME: ICD-10-CM

## 2025-02-18 DIAGNOSIS — M54.16 LUMBAR BACK PAIN WITH RADICULOPATHY AFFECTING RIGHT LOWER EXTREMITY: ICD-10-CM

## 2025-02-18 DIAGNOSIS — M48.061 DEGENERATIVE LUMBAR SPINAL STENOSIS: ICD-10-CM

## 2025-02-18 DIAGNOSIS — G60.8 IDIOPATHIC SMALL AND LARGE FIBER SENSORY NEUROPATHY: ICD-10-CM

## 2025-02-18 DIAGNOSIS — I65.23 BILATERAL CAROTID ARTERY STENOSIS: Primary | ICD-10-CM

## 2025-02-18 PROCEDURE — 1036F TOBACCO NON-USER: CPT | Performed by: PSYCHIATRY & NEUROLOGY

## 2025-02-18 PROCEDURE — G8427 DOCREV CUR MEDS BY ELIG CLIN: HCPCS | Performed by: PSYCHIATRY & NEUROLOGY

## 2025-02-18 PROCEDURE — 1159F MED LIST DOCD IN RCRD: CPT | Performed by: PSYCHIATRY & NEUROLOGY

## 2025-02-18 PROCEDURE — 1160F RVW MEDS BY RX/DR IN RCRD: CPT | Performed by: PSYCHIATRY & NEUROLOGY

## 2025-02-18 PROCEDURE — 99214 OFFICE O/P EST MOD 30 MIN: CPT | Performed by: PSYCHIATRY & NEUROLOGY

## 2025-02-18 PROCEDURE — 1123F ACP DISCUSS/DSCN MKR DOCD: CPT | Performed by: PSYCHIATRY & NEUROLOGY

## 2025-02-18 PROCEDURE — 1126F AMNT PAIN NOTED NONE PRSNT: CPT | Performed by: PSYCHIATRY & NEUROLOGY

## 2025-02-18 PROCEDURE — G8419 CALC BMI OUT NRM PARAM NOF/U: HCPCS | Performed by: PSYCHIATRY & NEUROLOGY

## 2025-02-18 RX ORDER — SILDENAFIL 100 MG/1
TABLET, FILM COATED ORAL
COMMUNITY

## 2025-02-18 ASSESSMENT — PATIENT HEALTH QUESTIONNAIRE - PHQ9
SUM OF ALL RESPONSES TO PHQ QUESTIONS 1-9: 0
SUM OF ALL RESPONSES TO PHQ QUESTIONS 1-9: 0
1. LITTLE INTEREST OR PLEASURE IN DOING THINGS: NOT AT ALL
SUM OF ALL RESPONSES TO PHQ QUESTIONS 1-9: 0
2. FEELING DOWN, DEPRESSED OR HOPELESS: NOT AT ALL
SUM OF ALL RESPONSES TO PHQ QUESTIONS 1-9: 0
SUM OF ALL RESPONSES TO PHQ9 QUESTIONS 1 & 2: 0

## 2025-02-18 NOTE — PROGRESS NOTES
Consult  REFERRED BY:  Karen Olmedo MD    CHIEF COMPLAINT: Patient seen for new problem of having numbness on the left side of his face for the last several weeks, he claims he does not clench his teeth, because he has dentures and takes at nighttime, and says the numbness seems to come on when he hangs his head down reading too long, which could indicate some type of cervical problem but that should not be affecting his face, so we will check a carotid Doppler study and MRI of the brain to make sure there is no new vascular or neoplastic process going on.  He apparently has not had any new falls, but did have that 1 bad fall in 2023 recent fall that occurred July 30, 2023 when he tripped on his right foot because he did not clear his foot because of his foot drop and fell and hit his head and was seen at [facility] and had a CT scan of the head done that showed mild atrophy and some chronic white matter disease with an old right lacunar infarct was old, but no acute changes noted.  He has an [facility]FO [facility] for his right foot drop but does not wear it.  [facility] the last year and a half but now wants to come back to see us.  He had a recent EMG study done by Dr. Cain but did document his neuropathy probably from his latent diabetes because his hemoglobin A1c is still 6.3.  He has no numbness or weakness in his arms and legs.  Just his old right sided foot drop from his previous back surgery      Subjective:     Arvin Godfrey is a 89 y.o.Right-handed  male seen for urgent work in evaluation at the request of Dr. Olmedo, of new problem of Patient seen for new problem of having numbness on the left side of his face for the last several weeks, he claims he does not clench his teeth, because he has dentures and takes at nighttime, and says the numbness seems to come on when he hangs his head down reading too long, which could indicate

## 2025-02-25 ENCOUNTER — HOSPITAL ENCOUNTER (OUTPATIENT)
Facility: HOSPITAL | Age: 89
Discharge: HOME | End: 2025-02-28
Attending: PSYCHIATRY & NEUROLOGY
Payer: MEDICARE

## 2025-02-25 DIAGNOSIS — R20.0 NUMBNESS AND TINGLING OF LEFT SIDE OF FACE: ICD-10-CM

## 2025-02-25 DIAGNOSIS — R20.2 NUMBNESS AND TINGLING OF LEFT SIDE OF FACE: ICD-10-CM

## 2025-02-25 DIAGNOSIS — I67.89 CEREBRAL MICROVASCULAR DISEASE: ICD-10-CM

## 2025-02-25 PROCEDURE — 6360000004 HC RX CONTRAST MEDICATION: Performed by: PSYCHIATRY & NEUROLOGY

## 2025-02-25 PROCEDURE — A9579 GAD-BASE MR CONTRAST NOS,1ML: HCPCS | Performed by: PSYCHIATRY & NEUROLOGY

## 2025-02-25 PROCEDURE — 70553 MRI BRAIN STEM W/O & W/DYE: CPT

## 2025-02-25 RX ADMIN — GADOTERIDOL 14 ML: 279.3 INJECTION, SOLUTION INTRAVENOUS at 16:26

## 2025-02-27 ENCOUNTER — CLINICAL DOCUMENTATION (OUTPATIENT)
Age: 89
End: 2025-02-27

## 2025-02-27 NOTE — PROGRESS NOTES
I called the patient let him know the MRI scan just showed age-related changes slight atrophy and white matter disease, but no explanation for the facial numbness, he thinks he had a cyst previously wants to know if he got lowered on the chin which we did not so he is going to be out of town for several weeks but when he gets back he will call us if he is still symptomatic he can always order a facial MRI   96

## 2025-03-03 ENCOUNTER — TELEPHONE (OUTPATIENT)
Age: 89
End: 2025-03-03

## 2025-03-03 DIAGNOSIS — R20.0 NUMBNESS AND TINGLING OF RIGHT SIDE OF FACE: Primary | ICD-10-CM

## 2025-03-03 DIAGNOSIS — R51.9 LEFT-SIDED FACE PAIN: ICD-10-CM

## 2025-03-03 DIAGNOSIS — D36.7 FACIAL BENIGN NEOPLASM: ICD-10-CM

## 2025-03-03 DIAGNOSIS — R20.2 NUMBNESS AND TINGLING OF RIGHT SIDE OF FACE: Primary | ICD-10-CM

## 2025-03-03 DIAGNOSIS — R20.2 NUMBNESS AND TINGLING OF LEFT SIDE OF FACE: Primary | ICD-10-CM

## 2025-03-03 DIAGNOSIS — R20.0 NUMBNESS AND TINGLING OF LEFT SIDE OF FACE: Primary | ICD-10-CM

## 2025-03-03 NOTE — TELEPHONE ENCOUNTER
Patient called to say that Dr. Denney asked if he wanted a \"face scan.\" He said he was available to do that today or tomorrow. Please call him at . Thank you.

## 2025-03-04 NOTE — TELEPHONE ENCOUNTER
Spoke with patient ID with two identifiers. Informed patient  \"Tell him ordered today \" patient stated thank you. I also provided him with the scheduling department number.

## 2025-03-05 DIAGNOSIS — D36.7 FACIAL BENIGN NEOPLASM: Primary | ICD-10-CM

## 2025-03-05 NOTE — TELEPHONE ENCOUNTER
Chelo w/ [employer] called stating that they received the order for the MRI ORBITS FACE NECK W WO CONTRAST [[id number]] (Order [id number]) but insurance is denying coverage due to the codes used. Chelo asked that we put in a new order using different codes. Please advise. [phone]

## 2025-03-10 ENCOUNTER — TELEPHONE (OUTPATIENT)
Age: 89
End: 2025-03-10

## 2025-03-10 NOTE — TELEPHONE ENCOUNTER
HIPAA Verified (if caller is someone other than patient): yes       Reason for Call:   Requesting new DX order    Details from Caller:  [facility] [employer] states that the codes for pts MRI of the orbit face head and neck with and without contrast is triggered an ABM. Insurance is required to have an approved DX code      Message: (any additional details from patient/caller not covered above)  Contact number [phone]

## 2025-03-10 NOTE — TELEPHONE ENCOUNTER
Patient called asking if Dr Denney could please change the code on the MRI as he needs this asap. States he is in a lot of pain and he leaves town for 6 days on 3/12 and wants to get this MRI done asap. Please advise. [phone]

## 2025-03-11 DIAGNOSIS — C76.0 FACIAL MALIGNANT NEOPLASM (HCC): Primary | ICD-10-CM

## 2025-03-11 NOTE — TELEPHONE ENCOUNTER
Patient called again for an update. States he needs this done asap. Apologized to patient and advised it has been sent to Dr Denney as high priority. Patient asked for a call back once completed so he can get back with [employer] asap to set up the MRI. Pt asked me to emphasize that he is feeling a lot of pain in his face and really needs this test done asap. [phone]

## 2025-03-12 NOTE — TELEPHONE ENCOUNTER
Spoke with patient and advised him that Dr Denney put in a new order for the MRI with different codes. Pt thanked me and verbalized understanding.

## 2025-03-16 ENCOUNTER — TELEPHONE (OUTPATIENT)
Age: 89
End: 2025-03-16

## 2025-03-20 ENCOUNTER — HOSPITAL ENCOUNTER (OUTPATIENT)
Facility: HOSPITAL | Age: 89
Discharge: HOME | End: 2025-03-23
Attending: PSYCHIATRY & NEUROLOGY
Payer: MEDICARE

## 2025-03-20 DIAGNOSIS — C76.0 FACIAL MALIGNANT NEOPLASM (HCC): ICD-10-CM

## 2025-03-20 PROCEDURE — 6360000004 HC RX CONTRAST MEDICATION: Performed by: PSYCHIATRY & NEUROLOGY

## 2025-03-20 PROCEDURE — 70543 MRI ORBT/FAC/NCK W/O &W/DYE: CPT

## 2025-03-20 PROCEDURE — A9579 GAD-BASE MR CONTRAST NOS,1ML: HCPCS | Performed by: PSYCHIATRY & NEUROLOGY

## 2025-03-20 RX ADMIN — GADOTERIDOL 14 ML: 279.3 INJECTION, SOLUTION INTRAVENOUS at 20:22

## 2025-03-23 ENCOUNTER — CLINICAL DOCUMENTATION (OUTPATIENT)
Age: 89
End: 2025-03-23

## 2025-06-24 ENCOUNTER — OFFICE VISIT (OUTPATIENT)
Age: 89
End: 2025-06-24
Payer: MEDICARE

## 2025-06-24 VITALS
BODY MASS INDEX: 24.91 KG/M2 | WEIGHT: 155 LBS | RESPIRATION RATE: 18 BRPM | SYSTOLIC BLOOD PRESSURE: 122 MMHG | OXYGEN SATURATION: 97 % | HEIGHT: 66 IN | DIASTOLIC BLOOD PRESSURE: 80 MMHG | HEART RATE: 65 BPM

## 2025-06-24 DIAGNOSIS — M51.361 DEGENERATION OF INTERVERTEBRAL DISC OF LUMBAR REGION WITH LOWER EXTREMITY PAIN: Primary | ICD-10-CM

## 2025-06-24 DIAGNOSIS — G62.9 SENSORY MOTOR NEUROPATHY: ICD-10-CM

## 2025-06-24 DIAGNOSIS — R20.2 NUMBNESS AND TINGLING OF LEFT SIDE OF FACE: ICD-10-CM

## 2025-06-24 DIAGNOSIS — R20.0 NUMBNESS AND TINGLING OF LEFT SIDE OF FACE: ICD-10-CM

## 2025-06-24 PROCEDURE — 1123F ACP DISCUSS/DSCN MKR DOCD: CPT | Performed by: NURSE PRACTITIONER

## 2025-06-24 PROCEDURE — G8419 CALC BMI OUT NRM PARAM NOF/U: HCPCS | Performed by: NURSE PRACTITIONER

## 2025-06-24 PROCEDURE — 1159F MED LIST DOCD IN RCRD: CPT | Performed by: NURSE PRACTITIONER

## 2025-06-24 PROCEDURE — 99214 OFFICE O/P EST MOD 30 MIN: CPT | Performed by: NURSE PRACTITIONER

## 2025-06-24 PROCEDURE — G8427 DOCREV CUR MEDS BY ELIG CLIN: HCPCS | Performed by: NURSE PRACTITIONER

## 2025-06-24 PROCEDURE — 1126F AMNT PAIN NOTED NONE PRSNT: CPT | Performed by: NURSE PRACTITIONER

## 2025-06-24 PROCEDURE — 1160F RVW MEDS BY RX/DR IN RCRD: CPT | Performed by: NURSE PRACTITIONER

## 2025-06-24 PROCEDURE — 1036F TOBACCO NON-USER: CPT | Performed by: NURSE PRACTITIONER

## 2025-06-24 RX ORDER — GABAPENTIN 300 MG/1
300 CAPSULE ORAL 3 TIMES DAILY
COMMUNITY
Start: 2025-06-16 | End: 2025-06-24

## 2025-06-24 RX ORDER — CELECOXIB 200 MG/1
200 CAPSULE ORAL DAILY
COMMUNITY
Start: 2025-06-16

## 2025-06-24 RX ORDER — ALENDRONATE SODIUM 70 MG/1
70 TABLET ORAL
COMMUNITY

## 2025-06-24 ASSESSMENT — PATIENT HEALTH QUESTIONNAIRE - PHQ9
SUM OF ALL RESPONSES TO PHQ QUESTIONS 1-9: 0
1. LITTLE INTEREST OR PLEASURE IN DOING THINGS: NOT AT ALL
SUM OF ALL RESPONSES TO PHQ QUESTIONS 1-9: 0
1. LITTLE INTEREST OR PLEASURE IN DOING THINGS: NOT AT ALL
2. FEELING DOWN, DEPRESSED OR HOPELESS: NOT AT ALL
SUM OF ALL RESPONSES TO PHQ QUESTIONS 1-9: 0
SUM OF ALL RESPONSES TO PHQ QUESTIONS 1-9: 0
2. FEELING DOWN, DEPRESSED OR HOPELESS: NOT AT ALL
SUM OF ALL RESPONSES TO PHQ QUESTIONS 1-9: 0

## 2025-06-24 ASSESSMENT — ENCOUNTER SYMPTOMS
TROUBLE SWALLOWING: 0
BACK PAIN: 1
SINUS PRESSURE: 1

## 2025-06-24 NOTE — PROGRESS NOTES
Chief Complaint   Patient presents with    Neurologic Problem     Follow up for neuropathy - from ear to neck - is in PT -   Having an ache in right leg      1. Have you been to the ER, urgent care clinic since your last visit?  Hospitalized since your last visit? No     2. Have you seen or consulted any other health care providers outside of the [facility] since your last visit?  Include any pap smears or colon screening.  Yes     
but does not wear it.  [facility] has [facility] for the last year and a half but now wants to come back to see us.  He had a recent EMG study done by Dr. Cain but did document his neuropathy probably from his latent diabetes because his hemoglobin A1c is still 6.3.  He has no numbness or weakness in his arms and legs.  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  He is going to see Dr. WYATT for more injections in his back next week, and we encouraged him to continue that.  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  He is to continue his other medications and call us if there is any problem.  We advised the patient to try to do some exercise regularly, to try to maintain his function, and to try to stay active physically mentally and eat a Mediterranean type diet to protect his memory and keep his neuropathy from worsening.  On the patient's fall and microvascular disease and atrophic changes of the head, we will follow out with a carotid Doppler study to be done to make sure there is no recurrent carotid stenosis, and possibly reimage the patient again in 3 to 6 months time or earlier if needed to make sure he did not have a chronic subdural or progressive atrophy of the brain requiring other monitoring.  We advised him to call us immediately if there is any change in his condition or worsening of his neurologic status.  He is also advised that the single best treatment for his neuropathy is good control of his blood sugars because of his diabetes.    REVIEW OF SYSTEMS:     Review of Systems   HENT:

## (undated) DEVICE — MARKER,SKIN,WI/RULER AND LABELS: Brand: MEDLINE

## (undated) DEVICE — SYR 10ML LUER LOK 1/5ML GRAD --

## (undated) DEVICE — COVER,TABLE,HEAVY DUTY,77"X90",STRL: Brand: MEDLINE

## (undated) DEVICE — PREP SKN CHLRAPRP APL 26ML STR --

## (undated) DEVICE — NEEDLE HYPO 18GA L1.5IN PNK S STL HUB POLYPR SHLD REG BVL

## (undated) DEVICE — BANDAGE COBAN 4 IN COMPR W4INXL5YD FOAM COHESIVE QUIK STK SELF ADH SFT

## (undated) DEVICE — SUTURE FIBERWIRE SZ 2 W/ TAPERED NEEDLE BLUE L38IN NONABSORB BLU L26.5MM 1/2 CIRCLE AR7200

## (undated) DEVICE — ROCKER SWITCH PENCIL BLADE ELECTRODE, HOLSTER: Brand: EDGE

## (undated) DEVICE — NEEDLE HYPO 25GA L1.5IN BVL ORIENTED ECLIPSE

## (undated) DEVICE — DERMABOND SKIN ADH 0.7ML -- DERMABOND ADVANCED 12/BX

## (undated) DEVICE — SKIN MARKER,REGULAR TIP WITH RULER AND LABELS: Brand: DEVON

## (undated) DEVICE — VISUALIZATION SYSTEM: Brand: CLEARIFY

## (undated) DEVICE — PAD GROUNDING ADULT UNCORDED  5-PACK

## (undated) DEVICE — NEEDLE SPNL L4.75IN OD25GA QNCKE TYP SPINOCAN

## (undated) DEVICE — (D)PREP SKN CHLRAPRP APPL 26ML -- CONVERT TO ITEM 371833

## (undated) DEVICE — POLYLINED TOWEL: Brand: CONVERTORS

## (undated) DEVICE — REM POLYHESIVE ADULT PATIENT RETURN ELECTRODE: Brand: VALLEYLAB

## (undated) DEVICE — HYPODERMIC SAFETY NEEDLE: Brand: MAGELLAN

## (undated) DEVICE — TOWEL SURG W17XL27IN STD BLU COT NONFENESTRATED PREWASHED

## (undated) DEVICE — SYR 5ML 1/5 GRAD LL NSAF LF --

## (undated) DEVICE — SUTURE VCRL SZ 2-0 L36IN ABSRB UD L36MM CT-1 1/2 CIR J945H

## (undated) DEVICE — PACK,SHOULDER II,DRAPE: Brand: MEDLINE

## (undated) DEVICE — GLOVE ORANGE PI 8   MSG9080

## (undated) DEVICE — HANDPIECE SET WITH COAXIAL HIGH FLOW TIP AND SUCTION TUBE: Brand: INTERPULSE

## (undated) DEVICE — COVER LT HNDL PLAS RIG 1 PER PK

## (undated) DEVICE — STERILE POLYISOPRENE POWDER-FREE SURGICAL GLOVES: Brand: PROTEXIS

## (undated) DEVICE — SUTURE ABSORBABLE MONOFILAMENT 2-0 WND CLOSURE GRN V-LOC 180 VLOCL0315

## (undated) DEVICE — TIP SUCT CRV REG REDI

## (undated) DEVICE — 3M™ IOBAN™ 2 ANTIMICROBIAL INCISE DRAPE 6640EZ: Brand: IOBAN™ 2

## (undated) DEVICE — SUTURE PDS II SZ 2-0 L27IN ABSRB VLT SH L26MM 1/2 CIR Z317H

## (undated) DEVICE — ADULT SPO2 SENSOR: Brand: NELLCOR

## (undated) DEVICE — SET EXTN PRIMING 0.59ML 8.5IN 1.55LB PRSS RATE MINIBOR PUR

## (undated) DEVICE — BASIN ST MAJOR-NO CAUTERY: Brand: MEDLINE INDUSTRIES, INC.

## (undated) DEVICE — SOLUTION IRRIG 3000ML 0.9% SOD CHL USP UROMATIC PLAS CONT

## (undated) DEVICE — Device

## (undated) DEVICE — SYR LR LCK 1ML GRAD NSAF 30ML --

## (undated) DEVICE — INFECTION CONTROL KIT SYS

## (undated) DEVICE — Device: Brand: JELCO

## (undated) DEVICE — STOCKINETTE,IMPERVIOUS,12X48,STERILE: Brand: MEDLINE

## (undated) DEVICE — SPONGE LAP 18X18IN STRL -- 5/PK

## (undated) DEVICE — 3M™ IOBAN™ 2 ANTIMICROBIAL INCISE DRAPE 6651EZ: Brand: IOBAN™ 2

## (undated) DEVICE — BLADE ASSEMB CLP HAIR FINE --

## (undated) DEVICE — SUTURE V-LOC 180 SZ 2-0 L9IN ABSRB VLT GS-21 L37MM 1/2 CIR VLOCM0345

## (undated) DEVICE — SUT VCRL 2-0 54IN UD --

## (undated) DEVICE — 2108 SERIES SAGITTAL BLADE (24.8 X 0.88 X 73.8MM)

## (undated) DEVICE — SURGICAL PROCEDURE KIT GEN LAPAROSCOPY LF

## (undated) DEVICE — TUBING SUCT 12FR MAL ALUM SHFT FN CAP VENT UNIV CONN W/ OBT

## (undated) DEVICE — Z DISCONTINUED USE 2717541 SUTURE STRATAFIX SZ 3-0 L30CM NONABSORBABLE UD L26MM FS 3/8

## (undated) DEVICE — HYPODERMIC SAFETY NEEDLE: Brand: MONOJECT

## (undated) DEVICE — DECANTER BAG 9": Brand: MEDLINE INDUSTRIES, INC.

## (undated) DEVICE — SUTURE VCRL SZ 4-0 L27IN ABSRB UD L19MM PS-2 3/8 CIR PRIM J426H

## (undated) DEVICE — GARMENT,MEDLINE,DVT,INT,CALF,MED, GEN2: Brand: MEDLINE

## (undated) DEVICE — CLIP LIG M BLU TI HRT SHP WIRE HORZ 600 PER BX

## (undated) DEVICE — COVER MPLR TIP CRV SCIS ACC DA VINCI

## (undated) DEVICE — STRAP,POSITIONING,KNEE/BODY,FOAM,4X60": Brand: MEDLINE

## (undated) DEVICE — SUTURE VCRL SZ 0 L27IN ABSRB UD L36MM CT-1 1/2 CIR J260H

## (undated) DEVICE — DRAPE,REIN 53X77,STERILE: Brand: MEDLINE

## (undated) DEVICE — OPTIVAC KIT DOUBLE MIX 80GM: Brand: DJO SURGICAL

## (undated) DEVICE — NDL FLTR TIP 5 MIC 18GX1.5IN --

## (undated) DEVICE — INTENDED FOR TISSUE SEPARATION, AND OTHER PROCEDURES THAT REQUIRE A SHARP SURGICAL BLADE TO PUNCTURE OR CUT.: Brand: BARD-PARKER ® CARBON RIB-BACK BLADES

## (undated) DEVICE — COVER,MAYO STAND,STERILE: Brand: MEDLINE